# Patient Record
Sex: MALE | Race: OTHER | Employment: UNEMPLOYED | URBAN - METROPOLITAN AREA
[De-identification: names, ages, dates, MRNs, and addresses within clinical notes are randomized per-mention and may not be internally consistent; named-entity substitution may affect disease eponyms.]

---

## 2019-01-06 ENCOUNTER — HOSPITAL ENCOUNTER (EMERGENCY)
Age: 49
Discharge: HOME | End: 2019-01-06
Payer: COMMERCIAL

## 2019-01-06 VITALS
RESPIRATION RATE: 18 BRPM | TEMPERATURE: 98.2 F | DIASTOLIC BLOOD PRESSURE: 98 MMHG | SYSTOLIC BLOOD PRESSURE: 158 MMHG | HEART RATE: 67 BPM | OXYGEN SATURATION: 97 %

## 2019-01-06 DIAGNOSIS — I10 ASYMPTOMATIC HYPERTENSION: Primary | ICD-10-CM

## 2019-01-06 PROCEDURE — 99282 EMERGENCY DEPT VISIT SF MDM: CPT

## 2019-01-06 PROCEDURE — 6370000000 HC RX 637 (ALT 250 FOR IP): Performed by: NURSE PRACTITIONER

## 2019-01-06 RX ORDER — ATORVASTATIN CALCIUM 40 MG/1
40 TABLET, FILM COATED ORAL DAILY
Status: ON HOLD | COMMUNITY
End: 2022-05-23 | Stop reason: HOSPADM

## 2019-01-06 RX ORDER — LEVETIRACETAM 500 MG/1
500 TABLET ORAL 2 TIMES DAILY
Status: ON HOLD | COMMUNITY
End: 2022-05-23 | Stop reason: HOSPADM

## 2019-01-06 RX ORDER — AMLODIPINE BESYLATE 10 MG/1
10 TABLET ORAL DAILY
Qty: 30 TABLET | Refills: 0 | Status: ON HOLD | OUTPATIENT
Start: 2019-01-06 | End: 2022-05-23 | Stop reason: HOSPADM

## 2019-01-06 RX ORDER — AMLODIPINE BESYLATE 10 MG/1
10 TABLET ORAL DAILY
COMMUNITY
End: 2019-01-06

## 2019-01-06 RX ORDER — CARVEDILOL 25 MG/1
25 TABLET ORAL 2 TIMES DAILY
Status: ON HOLD | COMMUNITY
End: 2022-05-23 | Stop reason: HOSPADM

## 2019-01-06 RX ORDER — LOSARTAN POTASSIUM AND HYDROCHLOROTHIAZIDE 12.5; 1 MG/1; MG/1
1 TABLET ORAL DAILY
Status: ON HOLD | COMMUNITY
End: 2022-05-23 | Stop reason: HOSPADM

## 2019-01-06 RX ORDER — AMLODIPINE BESYLATE 5 MG/1
10 TABLET ORAL ONCE
Status: COMPLETED | OUTPATIENT
Start: 2019-01-06 | End: 2019-01-06

## 2019-01-06 RX ADMIN — AMLODIPINE BESYLATE 10 MG: 5 TABLET ORAL at 12:49

## 2021-05-24 ENCOUNTER — APPOINTMENT (OUTPATIENT)
Dept: GENERAL RADIOLOGY | Age: 51
DRG: 421 | End: 2021-05-24
Payer: COMMERCIAL

## 2021-05-24 ENCOUNTER — HOSPITAL ENCOUNTER (INPATIENT)
Age: 51
LOS: 2 days | Discharge: NURSING, REHAB OR LONG-TERM CARE | DRG: 421 | End: 2021-05-26
Attending: EMERGENCY MEDICINE | Admitting: INTERNAL MEDICINE
Payer: COMMERCIAL

## 2021-05-24 ENCOUNTER — APPOINTMENT (OUTPATIENT)
Dept: CT IMAGING | Age: 51
DRG: 421 | End: 2021-05-24
Payer: COMMERCIAL

## 2021-05-24 DIAGNOSIS — S62.667A CLOSED NONDISPLACED FRACTURE OF DISTAL PHALANX OF LEFT LITTLE FINGER, INITIAL ENCOUNTER: ICD-10-CM

## 2021-05-24 DIAGNOSIS — E04.1 THYROID NODULE: ICD-10-CM
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
## 2021-05-24 DIAGNOSIS — R62.7 FAILURE TO THRIVE IN ADULT: ICD-10-CM

## 2021-05-24 DIAGNOSIS — S62.657A CLOSED NONDISPLACED FRACTURE OF MIDDLE PHALANX OF LEFT LITTLE FINGER, INITIAL ENCOUNTER: ICD-10-CM

## 2021-05-24 DIAGNOSIS — N28.1 RENAL CYST: ICD-10-CM

## 2021-05-24 PROBLEM — M25.552 LEFT HIP PAIN: Status: ACTIVE | Noted: 2021-05-24

## 2021-05-24 LAB
ANION GAP SERPL CALCULATED.3IONS-SCNC: 11 MMOL/L (ref 7–16)
ANION GAP SERPL CALCULATED.3IONS-SCNC: 12 MMOL/L (ref 7–16)
APTT: 21.6 SEC (ref 24.5–35.1)
BASOPHILS ABSOLUTE: 0.03 E9/L (ref 0–0.2)
BASOPHILS RELATIVE PERCENT: 0.4 % (ref 0–2)
BUN BLDV-MCNC: 13 MG/DL (ref 6–20)
BUN BLDV-MCNC: 14 MG/DL (ref 6–20)
CALCIUM SERPL-MCNC: 8.8 MG/DL (ref 8.6–10.2)
CALCIUM SERPL-MCNC: 9.2 MG/DL (ref 8.6–10.2)
CHLORIDE BLD-SCNC: 103 MMOL/L (ref 98–107)
CHLORIDE BLD-SCNC: 97 MMOL/L (ref 98–107)
CO2: 21 MMOL/L (ref 22–29)
CO2: 24 MMOL/L (ref 22–29)
CREAT SERPL-MCNC: 0.9 MG/DL (ref 0.7–1.2)
CREAT SERPL-MCNC: 0.9 MG/DL (ref 0.7–1.2)
EOSINOPHILS ABSOLUTE: 0.26 E9/L (ref 0.05–0.5)
EOSINOPHILS RELATIVE PERCENT: 3.6 % (ref 0–6)
GFR AFRICAN AMERICAN: >60
GFR AFRICAN AMERICAN: >60
GFR NON-AFRICAN AMERICAN: >60 ML/MIN/1.73
GFR NON-AFRICAN AMERICAN: >60 ML/MIN/1.73
GLUCOSE BLD-MCNC: 274 MG/DL (ref 74–99)
GLUCOSE BLD-MCNC: 544 MG/DL (ref 74–99)
HCT VFR BLD CALC: 45.2 % (ref 37–54)
HEMOGLOBIN: 15.5 G/DL (ref 12.5–16.5)
IMMATURE GRANULOCYTES #: 0.02 E9/L
IMMATURE GRANULOCYTES %: 0.3 % (ref 0–5)
INR BLD: 1.1
LYMPHOCYTES ABSOLUTE: 1.57 E9/L (ref 1.5–4)
LYMPHOCYTES RELATIVE PERCENT: 21.8 % (ref 20–42)
MCH RBC QN AUTO: 27.4 PG (ref 26–35)
MCHC RBC AUTO-ENTMCNC: 34.3 % (ref 32–34.5)
MCV RBC AUTO: 80 FL (ref 80–99.9)
MONOCYTES ABSOLUTE: 0.58 E9/L (ref 0.1–0.95)
MONOCYTES RELATIVE PERCENT: 8.1 % (ref 2–12)
NEUTROPHILS ABSOLUTE: 4.74 E9/L (ref 1.8–7.3)
NEUTROPHILS RELATIVE PERCENT: 65.8 % (ref 43–80)
PDW BLD-RTO: 12.6 FL (ref 11.5–15)
PLATELET # BLD: 251 E9/L (ref 130–450)
PMV BLD AUTO: 10.4 FL (ref 7–12)
POTASSIUM REFLEX MAGNESIUM: 4.1 MMOL/L (ref 3.5–5)
POTASSIUM REFLEX MAGNESIUM: 4.8 MMOL/L (ref 3.5–5)
PROTHROMBIN TIME: 12 SEC (ref 9.3–12.4)
RBC # BLD: 5.65 E12/L (ref 3.8–5.8)
SODIUM BLD-SCNC: 133 MMOL/L (ref 132–146)
SODIUM BLD-SCNC: 135 MMOL/L (ref 132–146)
TROPONIN: <0.01 NG/ML (ref 0–0.03)
WBC # BLD: 7.2 E9/L (ref 4.5–11.5)

## 2021-05-24 PROCEDURE — 6360000002 HC RX W HCPCS: Performed by: STUDENT IN AN ORGANIZED HEALTH CARE EDUCATION/TRAINING PROGRAM

## 2021-05-24 PROCEDURE — G0378 HOSPITAL OBSERVATION PER HR: HCPCS

## 2021-05-24 PROCEDURE — 73521 X-RAY EXAM HIPS BI 2 VIEWS: CPT

## 2021-05-24 PROCEDURE — 6370000000 HC RX 637 (ALT 250 FOR IP): Performed by: INTERNAL MEDICINE

## 2021-05-24 PROCEDURE — 84484 ASSAY OF TROPONIN QUANT: CPT

## 2021-05-24 PROCEDURE — 73090 X-RAY EXAM OF FOREARM: CPT

## 2021-05-24 PROCEDURE — 80048 BASIC METABOLIC PNL TOTAL CA: CPT

## 2021-05-24 PROCEDURE — 73620 X-RAY EXAM OF FOOT: CPT

## 2021-05-24 PROCEDURE — 72128 CT CHEST SPINE W/O DYE: CPT

## 2021-05-24 PROCEDURE — 85730 THROMBOPLASTIN TIME PARTIAL: CPT

## 2021-05-24 PROCEDURE — 73120 X-RAY EXAM OF HAND: CPT

## 2021-05-24 PROCEDURE — 70450 CT HEAD/BRAIN W/O DYE: CPT

## 2021-05-24 PROCEDURE — 73130 X-RAY EXAM OF HAND: CPT

## 2021-05-24 PROCEDURE — 2580000003 HC RX 258: Performed by: RADIOLOGY

## 2021-05-24 PROCEDURE — 90715 TDAP VACCINE 7 YRS/> IM: CPT | Performed by: STUDENT IN AN ORGANIZED HEALTH CARE EDUCATION/TRAINING PROGRAM

## 2021-05-24 PROCEDURE — 96372 THER/PROPH/DIAG INJ SC/IM: CPT

## 2021-05-24 PROCEDURE — 99283 EMERGENCY DEPT VISIT LOW MDM: CPT

## 2021-05-24 PROCEDURE — 6370000000 HC RX 637 (ALT 250 FOR IP): Performed by: STUDENT IN AN ORGANIZED HEALTH CARE EDUCATION/TRAINING PROGRAM

## 2021-05-24 PROCEDURE — 6360000004 HC RX CONTRAST MEDICATION: Performed by: RADIOLOGY

## 2021-05-24 PROCEDURE — 6360000002 HC RX W HCPCS: Performed by: INTERNAL MEDICINE

## 2021-05-24 PROCEDURE — 71275 CT ANGIOGRAPHY CHEST: CPT

## 2021-05-24 PROCEDURE — 93005 ELECTROCARDIOGRAM TRACING: CPT | Performed by: STUDENT IN AN ORGANIZED HEALTH CARE EDUCATION/TRAINING PROGRAM

## 2021-05-24 PROCEDURE — 73600 X-RAY EXAM OF ANKLE: CPT

## 2021-05-24 PROCEDURE — 85025 COMPLETE CBC W/AUTO DIFF WBC: CPT

## 2021-05-24 PROCEDURE — 72125 CT NECK SPINE W/O DYE: CPT

## 2021-05-24 PROCEDURE — 97165 OT EVAL LOW COMPLEX 30 MIN: CPT

## 2021-05-24 PROCEDURE — 90471 IMMUNIZATION ADMIN: CPT | Performed by: STUDENT IN AN ORGANIZED HEALTH CARE EDUCATION/TRAINING PROGRAM

## 2021-05-24 PROCEDURE — 72131 CT LUMBAR SPINE W/O DYE: CPT

## 2021-05-24 PROCEDURE — 2580000003 HC RX 258: Performed by: INTERNAL MEDICINE

## 2021-05-24 PROCEDURE — 71045 X-RAY EXAM CHEST 1 VIEW: CPT

## 2021-05-24 PROCEDURE — 2580000003 HC RX 258: Performed by: STUDENT IN AN ORGANIZED HEALTH CARE EDUCATION/TRAINING PROGRAM

## 2021-05-24 PROCEDURE — 97161 PT EVAL LOW COMPLEX 20 MIN: CPT | Performed by: PHYSICAL THERAPIST

## 2021-05-24 PROCEDURE — 74177 CT ABD & PELVIS W/CONTRAST: CPT

## 2021-05-24 PROCEDURE — 1200000000 HC SEMI PRIVATE

## 2021-05-24 PROCEDURE — 85610 PROTHROMBIN TIME: CPT

## 2021-05-24 RX ORDER — ASPIRIN 81 MG/1
81 TABLET, COATED ORAL DAILY
Status: ON HOLD | COMMUNITY
Start: 2021-05-08 | End: 2022-05-20 | Stop reason: HOSPADM

## 2021-05-24 RX ORDER — ESCITALOPRAM OXALATE 20 MG/1
20 TABLET ORAL DAILY
COMMUNITY
Start: 2021-04-29

## 2021-05-24 RX ORDER — ACETAMINOPHEN 650 MG/1
650 SUPPOSITORY RECTAL EVERY 6 HOURS PRN
Status: DISCONTINUED | OUTPATIENT
Start: 2021-05-24 | End: 2021-05-26 | Stop reason: HOSPADM

## 2021-05-24 RX ORDER — AMLODIPINE BESYLATE 10 MG/1
10 TABLET ORAL DAILY
COMMUNITY
Start: 2021-05-14 | End: 2021-06-11

## 2021-05-24 RX ORDER — SODIUM CHLORIDE 0.9 % (FLUSH) 0.9 %
5-40 SYRINGE (ML) INJECTION EVERY 12 HOURS SCHEDULED
Status: DISCONTINUED | OUTPATIENT
Start: 2021-05-24 | End: 2021-05-26 | Stop reason: HOSPADM

## 2021-05-24 RX ORDER — 0.9 % SODIUM CHLORIDE 0.9 %
1000 INTRAVENOUS SOLUTION INTRAVENOUS ONCE
Status: COMPLETED | OUTPATIENT
Start: 2021-05-24 | End: 2021-05-24

## 2021-05-24 RX ORDER — SODIUM CHLORIDE 0.9 % (FLUSH) 0.9 %
10 SYRINGE (ML) INJECTION PRN
Status: DISCONTINUED | OUTPATIENT
Start: 2021-05-24 | End: 2021-05-26 | Stop reason: HOSPADM

## 2021-05-24 RX ORDER — SODIUM CHLORIDE 9 MG/ML
25 INJECTION, SOLUTION INTRAVENOUS PRN
Status: DISCONTINUED | OUTPATIENT
Start: 2021-05-24 | End: 2021-05-26 | Stop reason: HOSPADM

## 2021-05-24 RX ORDER — PROMETHAZINE HYDROCHLORIDE 25 MG/1
12.5 TABLET ORAL EVERY 6 HOURS PRN
Status: DISCONTINUED | OUTPATIENT
Start: 2021-05-24 | End: 2021-05-26 | Stop reason: HOSPADM

## 2021-05-24 RX ORDER — ACETAMINOPHEN 325 MG/1
650 TABLET ORAL EVERY 6 HOURS PRN
Status: DISCONTINUED | OUTPATIENT
Start: 2021-05-24 | End: 2021-05-26 | Stop reason: HOSPADM

## 2021-05-24 RX ORDER — LEVETIRACETAM 500 MG/1
500 TABLET ORAL 2 TIMES DAILY
COMMUNITY
Start: 2021-03-01 | End: 2021-06-11

## 2021-05-24 RX ORDER — SODIUM CHLORIDE 0.9 % (FLUSH) 0.9 %
10 SYRINGE (ML) INJECTION ONCE
Status: COMPLETED | OUTPATIENT
Start: 2021-05-24 | End: 2021-05-24

## 2021-05-24 RX ORDER — POLYETHYLENE GLYCOL 3350 17 G/17G
17 POWDER, FOR SOLUTION ORAL DAILY PRN
Status: DISCONTINUED | OUTPATIENT
Start: 2021-05-24 | End: 2021-05-26 | Stop reason: HOSPADM

## 2021-05-24 RX ORDER — INSULIN DEGLUDEC INJECTION 100 U/ML
25 INJECTION, SOLUTION SUBCUTANEOUS DAILY
Status: ON HOLD | COMMUNITY
Start: 2021-05-21 | End: 2022-05-23 | Stop reason: HOSPADM

## 2021-05-24 RX ORDER — LIDOCAINE HYDROCHLORIDE 10 MG/ML
10 INJECTION, SOLUTION EPIDURAL; INFILTRATION; INTRACAUDAL; PERINEURAL SEE ADMIN INSTRUCTIONS
Status: DISCONTINUED | OUTPATIENT
Start: 2021-05-24 | End: 2021-05-26 | Stop reason: HOSPADM

## 2021-05-24 RX ORDER — HYDROCODONE BITARTRATE AND ACETAMINOPHEN 5; 325 MG/1; MG/1
1 TABLET ORAL EVERY 6 HOURS PRN
Status: DISCONTINUED | OUTPATIENT
Start: 2021-05-24 | End: 2021-05-26 | Stop reason: HOSPADM

## 2021-05-24 RX ORDER — SODIUM CHLORIDE 9 MG/ML
INJECTION, SOLUTION INTRAVENOUS CONTINUOUS
Status: ACTIVE | OUTPATIENT
Start: 2021-05-24 | End: 2021-05-25

## 2021-05-24 RX ORDER — ONDANSETRON 2 MG/ML
4 INJECTION INTRAMUSCULAR; INTRAVENOUS EVERY 6 HOURS PRN
Status: DISCONTINUED | OUTPATIENT
Start: 2021-05-24 | End: 2021-05-26 | Stop reason: HOSPADM

## 2021-05-24 RX ORDER — CARVEDILOL 25 MG/1
25 TABLET ORAL 2 TIMES DAILY WITH MEALS
COMMUNITY
Start: 2021-03-24 | End: 2021-06-11 | Stop reason: CLARIF

## 2021-05-24 RX ADMIN — IOPAMIDOL 90 ML: 755 INJECTION, SOLUTION INTRAVENOUS at 11:36

## 2021-05-24 RX ADMIN — SODIUM CHLORIDE 1000 ML: 9 INJECTION, SOLUTION INTRAVENOUS at 12:10

## 2021-05-24 RX ADMIN — TETANUS TOXOID, REDUCED DIPHTHERIA TOXOID AND ACELLULAR PERTUSSIS VACCINE, ADSORBED 0.5 ML: 5; 2.5; 8; 8; 2.5 SUSPENSION INTRAMUSCULAR at 13:46

## 2021-05-24 RX ADMIN — HYDROCODONE BITARTRATE AND ACETAMINOPHEN 1 TABLET: 5; 325 TABLET ORAL at 21:24

## 2021-05-24 RX ADMIN — INSULIN LISPRO 10 UNITS: 100 INJECTION, SOLUTION INTRAVENOUS; SUBCUTANEOUS at 12:10

## 2021-05-24 RX ADMIN — ENOXAPARIN SODIUM 40 MG: 40 INJECTION SUBCUTANEOUS at 23:58

## 2021-05-24 RX ADMIN — SODIUM CHLORIDE: 9 INJECTION, SOLUTION INTRAVENOUS at 23:54

## 2021-05-24 RX ADMIN — Medication 10 ML: at 11:36

## 2021-05-24 RX ADMIN — Medication 10 ML: at 23:54

## 2021-05-24 SDOH — HEALTH STABILITY: MENTAL HEALTH: HOW OFTEN DO YOU HAVE A DRINK CONTAINING ALCOHOL?: NEVER

## 2021-05-24 ASSESSMENT — PAIN DESCRIPTION - LOCATION: LOCATION: FINGER (COMMENT WHICH ONE);ARM

## 2021-05-24 ASSESSMENT — PAIN SCALES - GENERAL
PAINLEVEL_OUTOF10: 5
PAINLEVEL_OUTOF10: 4

## 2021-05-24 ASSESSMENT — PAIN SCALES - WONG BAKER: WONGBAKER_NUMERICALRESPONSE: 8

## 2021-05-24 NOTE — ED PROVIDER NOTES
ATTENDING PROVIDER ATTESTATION:     Wong Hernandez presented to the emergency department for evaluation of Fall (pt is mute at baseline, fell last friday, abrasion above right eye. arm and leg pain. hx of seizures)   and was initially evaluated by the Medical Resident. See Original ED Note for H&P and ED course above. I have reviewed and discussed the case, including pertinent history (medical, surgical, family and social) and exam findings with the Medical Resident assigned to Wong Hernandez. I have personally performed and/or participated in the history, exam, medical decision making, and procedures and agree with all pertinent clinical information and any additional changes or corrections are noted below in my assessment and plan. I have discussed this patient in detail with the resident, and provided the instruction and education,       I have reviewed my findings and recommendations with the assigned Medical Resident, Wong Hernandez and members of family present at the time of disposition. I have performed a history and physical examination of this patient and directly supervised the resident caring for this patient      History of Present Illness:    Presents to the ED for fall, beginning a few days ago. The complaint has been constant, moderate in severity, and worsened by nothing. Patient had a prior stroke is nonverbal, has chronic weakness to the right arm and right leg. Here with his sister who said he fell Friday. She reports multiple abrasions on his arms and legs as well as above his right eye. She says she is having trouble caring for him at home. Again he is nonverbal not able to provide any history. He is not in any acute distress on arrival.    ENCOUNTER LIMITATION:    Please note that the HPI, ROS, Past History, and Physical Examination are limited due to this patients aphasia from prior stroke.         Review of Systems:   A complete review of systems was unable to be performed secondary to the limitations noted above         tems reviewed and are negative.    --------------------------------------------- PAST HISTORY ---------------------------------------------  Past Medical History:  has a past medical history of Seizures ([facility] [address] Stroke  Past Surgical History: No known history  Social History:  reports that he has never smoked. He has never used smokeless tobacco. He reports previous drug use. He reports that he does not drink alcohol. Family History: family history is not on file. Unless otherwise noted, family history is non contributory    The patients home medications have been reviewed. Allergies: Patient has no known allergies. Physical Exam:  Constitutional/General: Alert and awake, nonverbal  Head: Normocephalic and abrasion along the right eyelid  Eyes: PERRL, EOMI, sclera non icteric  ENT: Oropharynx clear, handling secretions  Neck: Supple, full ROM, no stridor, no meningeal signs  Respiratory: Lungs clear to auscultation bilaterally, no wheezes, rales, or rhonchi. Not in respiratory distress  Cardiovascular:  Regular rate. Regular rhythm. 2+ distal pulses. Equal extremity pulses. GI:  Abdomen Soft, Non tender, Non distended. No rebound, guarding, or rigidity. No pulsatile masses. Musculoskeletal: Chronic right upper and lower extremity weakness. Deformity to the left fifth finger (chronic per the sister). warm and well perfused, Palpable peripheral pulses  Integument: skin warm and dry. No rashes. Multiple abrasions along the hands and feet. Neurologic: Nonverbal (baseline), chronic right upper and lower extremity weakness. No new deficits per the sister        I directly supervised any procedures performed by the resident and was present for the procedure including all critical portions of the procedure      The cardiac monitor revealed sinus rhythm with a heart rate in the 60 s as interpreted by me.  The cardiac monitor was ordered secondary to the patient's fall and to monitor the patient for dysrhythmia. CPT [id number]      I, Dr. Ru Ruvalcaba, am the primary provider of record    My Medical Decision Making:         Fall, finger fracture, the family is also unable to care for him at home, questionable greater trochanteric hip fracture as well. Ortho consulted, sister request admission and placement to a nursing facility. He was unable to be placed directly from the ED secondary to his medical insurance. 1. Renal cyst    2. Thyroid nodule    3. Fall, initial encounter    4. Failure to thrive in adult    5.  Closed nondisplaced fracture of distal phalanx of left little finger, initial encounter           Sal Barthel, MD  05/24/21 6523

## 2021-05-24 NOTE — ED PROVIDER NOTES
Patient is a 58-year-old male presenting to emergency department after a fall. He is nonambulatory at baseline, apparently fell on Friday, he sustained abrasions above his right eye, bilateral hands, and foot. History is limited secondary to patient being nonverbal.  Currently he has been wincing with pain when his hands of been touched, onset was since Friday, uncertain severity, worsens palpation, nothing makes it better, sudden onset. Unknown if he lost consciousness, he does have history of diabetes, his sister denies him being on any blood thinners. Review of Systems   Unable to perform ROS: Patient nonverbal   Skin: Positive for wound (Bruising). Physical Exam  Vitals and nursing note reviewed. Constitutional:       Appearance: He is well-developed. HENT:      Head: Normocephalic. Comments: Abrasions on the face     Right Ear: External ear normal.      Left Ear: External ear normal.      Nose: Nose normal.   Eyes:      Extraocular Movements: Extraocular movements intact. Conjunctiva/sclera: Conjunctivae normal.      Pupils: Pupils are equal, round, and reactive to light. Comments: Tracks with eyes   Cardiovascular:      Rate and Rhythm: Normal rate and regular rhythm. Heart sounds: Normal heart sounds. No murmur heard. Pulmonary:      Effort: Pulmonary effort is normal. No respiratory distress. Breath sounds: Normal breath sounds. No wheezing or rales. Abdominal:      General: Bowel sounds are normal.      Palpations: Abdomen is soft. Tenderness: There is no abdominal tenderness. There is no guarding or rebound. Musculoskeletal:         General: Swelling (Left ring finger has ecchymosis, is tender to touch at the DIP) and tenderness (Several abrasions, one on the right foot, on the hands bilaterally, as well as the face) present. Cervical back: Normal range of motion and neck supple. Skin:     General: Skin is warm and dry. -------------------------------------------------    LABS:  Results for orders placed or performed during the hospital encounter of 05/24/21   CBC Auto Differential   Result Value Ref Range    WBC 7.2 4.5 - 11.5 E9/L    RBC 5.65 3.80 - 5.80 E12/L    Hemoglobin 15.5 12.5 - 16.5 g/dL    Hematocrit 45.2 37.0 - 54.0 %    MCV 80.0 80.0 - 99.9 fL    MCH 27.4 26.0 - 35.0 pg    MCHC 34.3 32.0 - 34.5 %    RDW 12.6 11.5 - 15.0 fL    Platelets 012 585 - 908 E9/L    MPV 10.4 7.0 - 12.0 fL    Neutrophils % 65.8 43.0 - 80.0 %    Immature Granulocytes % 0.3 0.0 - 5.0 %    Lymphocytes % 21.8 20.0 - 42.0 %    Monocytes % 8.1 2.0 - 12.0 %    Eosinophils % 3.6 0.0 - 6.0 %    Basophils % 0.4 0.0 - 2.0 %    Neutrophils Absolute 4.74 1.80 - 7.30 E9/L    Immature Granulocytes # 0.02 E9/L    Lymphocytes Absolute 1.57 1.50 - 4.00 E9/L    Monocytes Absolute 0.58 0.10 - 0.95 E9/L    Eosinophils Absolute 0.26 0.05 - 0.50 E9/L    Basophils Absolute 0.03 0.00 - 0.20 T3/S   Basic Metabolic Panel w/ Reflex to MG   Result Value Ref Range    Sodium 133 132 - 146 mmol/L    Potassium reflex Magnesium 4.8 3.5 - 5.0 mmol/L    Chloride 97 (L) 98 - 107 mmol/L    CO2 24 22 - 29 mmol/L    Anion Gap 12 7 - 16 mmol/L    Glucose 544 (HH) 74 - 99 mg/dL    BUN 14 6 - 20 mg/dL    CREATININE 0.9 0.7 - 1.2 mg/dL    GFR Non-African American >60 >=60 mL/min/1.73    GFR African American >60     Calcium 9.2 8.6 - 10.2 mg/dL   Troponin   Result Value Ref Range    Troponin <0.01 0.00 - 0.03 ng/mL   Protime-INR   Result Value Ref Range    Protime 12.0 9.3 - 12.4 sec    INR 1.1    APTT   Result Value Ref Range    aPTT 21.6 (L) 24.5 - 35.1 sec   Basic Metabolic Panel w/ Reflex to MG   Result Value Ref Range    Sodium 135 132 - 146 mmol/L    Potassium reflex Magnesium 4.1 3.5 - 5.0 mmol/L    Chloride 103 98 - 107 mmol/L    CO2 21 (L) 22 - 29 mmol/L    Anion Gap 11 7 - 16 mmol/L    Glucose 274 (H) 74 - 99 mg/dL    BUN 13 6 - 20 mg/dL    CREATININE 0.9 0.7 - 1.2 mg/dL    GFR Non-African American >60 >=60 mL/min/1.73    GFR African American >60     Calcium 8.8 8.6 - 10.2 mg/dL   EKG 12 Lead   Result Value Ref Range    Ventricular Rate 69 BPM    Atrial Rate 69 BPM    P-R Interval 172 ms    QRS Duration 78 ms    Q-T Interval 430 ms    QTc Calculation (Bazett) 460 ms    P Axis 57 degrees    R Axis -41 degrees    T Axis 12 degrees       RADIOLOGY:  XR HAND LEFT (2 VIEWS)   Final Result   Persistent boutonniere deformity of the left 5th finger and with acute,   nondisplaced dorsal plate avulsion fracture of the distal phalanx of this   same digit. CT Head WO Contrast   Final Result   1. There is no acute intracranial abnormality. Specifically, there is no   intracranial hemorrhage. 2. Atrophy and periventricular leukomalacia,   3. 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 4. Old infarct within the left cerebellum. CT Cervical Spine WO Contrast   Final Result   1. No acute osseous abnormality is identified. 2. An incidentally noted thyroid nodule measures up to 1.8 cm. Please see   the recommendation section below. 3. Nonspecific lucencies in the cervical spine are indeterminate without   aggressive imaging features. Please see the recommendation section below. RECOMMENDATIONS:   1. 1.8 cm incidental thyroid nodule. Recommend thyroid US. Reference: J Am   Bianca Radiol. 2015 Feb;12(2): 143-50   2. Nonspecific osseous lucencies are without aggressive imaging features. If   there are risk factors for osseous metastatic disease in this individual, MRI   cervical spine with and without contrast may be considered for   characterization. CT ABDOMEN PELVIS W IV CONTRAST Additional Contrast? None   Final Result   No traumatic or other acute abnormality is seen. CTA CHEST W CONTRAST   Final Result   1. There is no evidence of a pulmonary embolus   2. There are no findings of atypical or COVID pneumonia   3.  Minimal biapical pleuroparenchymal scarring 4. Minimal atelectasis within the lingula. CT THORACIC SPINE WO CONTRAST   Final Result   No acute osseous abnormality is identified. CT LUMBAR SPINE WO CONTRAST   Final Result   1. Chronic, multilevel, minimal to mild, asymmetric vertebral body   compression deformities are most notable right laterally at L4, followed by   adjacent levels, further contributing to mild levo-lordosis of the visualized   spine, with a Howell angle of approximately 9 degrees. 2. Multilevel degenerative disease, overall most notable at L5/S1, where   there is at least mild central spinal canal stenosis and moderate/severe   bilateral neural foraminal narrowing, slightly worse on the right. 3.  Moderate inferoposterior paraspinal muscular atrophy, lessening   superiorly. 4.  Incidental findings, as described. .      RECOMMENDATIONS:   1. If unexplained symptoms persist, consider noncontrast MRI of the lumbar   spine for further evaluation. .         XR CHEST PORTABLE   Final Result   1. Cardiomegaly. There is no evidence of failure or pneumonia. XR HAND RIGHT (MIN 3 VIEWS)   Final Result   1. Questionable nondisplaced subtle fracture through the base of the 1st   metacarpal only seen on the lateral view. A vague cortical step-off is   noted. Please obtain additional views of the right thumb. XR RADIUS ULNA LEFT (2 VIEWS)   Final Result   1. There is no acute fracture dislocation of the left forearm   2. Old ununited fracture of the ulnar styloid process         XR RADIUS ULNA RIGHT (2 VIEWS)   Final Result   1. There is no acute fracture of the right radius or ulna   2. Degenerative changes of the radiocarpal joint and intercarpal joints. XR HIP BILATERAL W AP PELVIS (2 VIEWS)   Final Result   1. Questionable subtle fracture within the left greater trochanter. Further   evaluation with CT of the left hip is recommended   2.  There is no appreciable fracture of the right hip   3. Advanced degenerative changes of the right left hips. XR FOOT RIGHT (2 VIEWS)   Final Result   1. There is no acute fracture of the right foot   2. Degenerative joint disease         XR HAND LEFT (MIN 3 VIEWS)   Final Result   1. Ulnar styloid fracture is favored to be nonacute. The bones otherwise   appear intact. 2. Boutonniere deformity of the 5th digit may be seen with rheumatoid   arthritis or tendinopathy/rupture of the central slip of the extensor   digitorum. 3. Arthritic changes are compatible with osteoarthritis. XR ANKLE RIGHT (2 VIEWS)   Final Result   1. There is no acute fracture or dislocation of the right ankle.                   ------------------------- NURSING NOTES AND VITALS REVIEWED ---------------------------  Date / Time Roomed:  5/24/2021  9:23 AM  ED Bed Assignment:  08/08    The nursing notes within the ED encounter and vital signs as below have been reviewed. Patient Vitals for the past 24 hrs:   BP Temp Pulse Resp SpO2 Weight   05/24/21 1426 130/80 -- 72 18 97 % --   05/24/21 0920 132/83 98.2 °F (36.8 °C) 76 18 98 % 180 lb (81.6 kg)   05/24/21 0913 -- 97.7 °F (36.5 °C) 67 -- 97 % --       Oxygen Saturation Interpretation: Normal    ------------------------------------------ PROGRESS NOTES ------------------------------------------    Counseling:  I have spoken with the sister and discussed todays results, in addition to providing specific details for the plan of care and counseling regarding the diagnosis and prognosis. Their questions are answered at this time and they are agreeable with the plan of admission.    --------------------------------- ADDITIONAL PROVIDER NOTES ---------------------------------  Consultations:  Time: 2248. Spoke with Dr. Marissa Clemente. Discussed case. They will admit the patient.   This patient's ED course included: a personal history and physicial examination, re-evaluation prior to disposition, multiple bedside re-evaluations, IV medications, cardiac monitoring and continuous pulse oximetry    This patient has remained hemodynamically stable during their ED course. Diagnosis:  1. Renal cyst    2. Thyroid nodule    3. Fall, initial encounter    4. Closed nondisplaced fracture of distal phalanx of right little finger, initial encounter    5. Failure to thrive in adult        Disposition:  Patient's disposition: Admit to med/surg floor  Patient's condition is stable.            Thee Aldrich MD  Resident  05/24/21 2074

## 2021-05-24 NOTE — PROGRESS NOTES
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 stairs to enter and ? rail. Bed is on first floor and bath is on first floor. Pt uses w/c for primary mobility, completing pivot transfers with assist and use of L side. Pt requires assist from his sister for mobility and ADLs. OBJECTIVE:   Initial Evaluation  Date: 21 Treatment Short Term/ Long Term   Goals   AM-PAC 6 Clicks      Was pt agreeable to Eval/treatment? yes     Does pt have pain? No signs of pain during session     Bed Mobility  Rolling: NT  Supine to sit: Max Ax2  Sit to supine: Max Ax2  Scooting: Max A to EOB  Rolling: Yari  Supine to sit: Yari  Sit to supine: Yari  Scooting: Yari to EOB   Transfers Sit to stand: Max A x2  Stand to sit: Max Ax2  Stand pivot: NT  Sit to stand: Min A  Stand to sit: Min A  Stand pivot: Min A with AAD   Ambulation      W/C mobility  NT    NT  NA, pt non-ambulatory at baseline  >50 feet with manual w/c at Supervision   Stair negotiation: ascended and descended  NT  NA   ROM BUE:  Limited due to R UE contractures, L UE WNL  BLE:  WNL     Strength BUE:  Defer to OT  R LE:  0/5  L LE: moderate functional weakness, unable to follow cues for MMT     Balance Sitting EOB:  Mod A  Static Standing: Max A x2  Sitting EOB:  Supervison  Dynamic Standing:  Min A with AAD     Pt is A & O x ? Unable to answer as pt is nonverbal at baseline.   Sensation:  Pt denies numbness and tingling to extremities  Edema:  unremarkable    Patient education  Pt educated on role of mobility, safety with transfers    Patient response to education:   Pt verbalized understanding Pt demonstrated skill Pt requires further education in this area   Pt non-verbal, sister states understanding With assistance yes     ASSESSMENT:    Conditions Requiring Skilled Therapeutic Intervention:    [x]Decreased strength     [x]Decreased ROM  [x]Decreased functional mobility  [x]Decreased balance   [x]Decreased endurance   [x]Decreased posture  []Decreased sensation  []Decreased coordination   []Decreased vision  [x]Decreased safety awareness   []Increased pain       Comments:  Pt resting semi-supine upon arrival, sister at bedside providing PLOF and home set up. Pt noted to have R UE contractures from previous CVA, no active movement of R LE. Pt demonstrates inconsistent ability to follow single step cues with verbal/visual/or bilingual instructions (sister translating as she declined  service). Pt maintains posterior listing with rotation of trunk towards R side with all sitting. He completed single sit<>stand with 2x assist blocking R LE and manual assist from therapists using gait belt for safety. Pt unsafe to attempt pivot transfer at this time due to pt difficulty maintaining NWB of R UE frequently reaching for support and decreased ability to follow cues. Pt returned to semi-supine upon completion of session with all needs in reach and sister at bedside. He will benefit from continued skilled PT services to improve independence with bed mobility, transfers, and balance. Treatment:  Patient practiced and was instructed in the following treatment:     Bed mobility: cues for engagement of B LEs and trunk, manual assist to complete supine<>sit as noted above  BlogGlue training: cues for maintaining NWB of R hand, manual assist for blocking of B feet and R LE at knee for extension, manual assist for lift/lower    Pt's/ family goals   1.  To improve safety and decrease falls with mobility    Prognosis is good for reaching above PT goals. Patient and or family understand(s) diagnosis, prognosis, and plan of care. yes    PHYSICAL THERAPY PLAN OF CARE:    PT POC is established based on physician order and patient diagnosis     Referring provider/PT Order:    05/24/21 1515  PT eval and treat Start: 05/24/21 1515, End: 05/24/21 1515, ONE TIME, Standing Count: 1 Occurrences, R      Bari Willis MD     Diagnosis:  Failure to thrive in adult [R62.7]  Specific instructions for next treatment:  Progress mobility as tolerated for transfers    Current Treatment Recommendations:     [x] Strengthening to improve independence with functional mobility   [] ROM to improve independence with functional mobility   [x] Balance Training to improve static/dynamic balance and to reduce fall risk  [x] Endurance Training to improve activity tolerance during functional mobility   [x] Transfer Training to improve safety and independence with all functional transfers   [] Gait Training to improve gait mechanics, endurance and asses need for appropriate assistive device  [] Stair Training in preparation for safe discharge home and/or into the community   [x] Positioning to prevent skin breakdown and contractures  [x] Safety and Education Training   [x] Patient/Caregiver Education   [] HEP  [] Other     PT long term treatment goals are located in above grid    Frequency of treatments: 2-5x/week x 1-2 weeks. Time in  1553  Time out  1613    Total Treatment Time  0 minutes     Evaluation Time includes thorough review of current medical information, gathering information on past medical history/social history and prior level of function, completion of standardized testing/informal observation of tasks, assessment of data and education on plan of care and goals.     CPT codes:  [x] Low Complexity PT evaluation 62810  [] Moderate Complexity PT evaluation 76025  [] High Complexity PT evaluation

## 2021-05-24 NOTE — LETTER
[place] Department Medicaid  CERTIFICATION OF NECESSITY  FOR NON-EMERGENCY TRANSPORTATION   BY GROUND AMBULANCE      Individual Information   1. Name: Lissa Murphy 2. [place] Medicaid Billing Number: [id number]   7. [facility] [place] [address] [address] [facility]      Transportation Provider Information   4. Provider Name: FABRICIO   5. [place] Medicaid Provider Number:  National Provider Identifier (NPI):      Certification  7. Criteria:  During transport, this individual requires:  [x] Medical treatment or continuous     supervision by an EMT. [] The administration or regulation of oxygen by another person. [] Supervised protective restraint. 8. Period Beginning Date: 2021     9. Length  [x] Not more than 10 day(s)  [] One Year     Additional Information Relevant to Certification   10. Comments or Explanations, If Necessary or Appropriate     HX CVA    NON-AMBULATORY    FREQUENT FALLS   NON-VERBAL     Certifying Practitioner Information   11. Name of Practitioner: SHEA [address] MD   12. [place] Medicaid Provider Number, If Applicable:  [address] Provider Identifier (NPI):      Signature Information   14. Date of Signature: 2021  15. Name of Person Signing:   Jennifer Graham RN    16. Signature and Professional Designation: Jennifer Graham RN 2021 1236     [facility] [address]  Rev. 2015    [address] Encounter Date/Time: 2021 [address] Account: [de-identified]    MRN: [id number]    Patient: Lissa Murphy    Contact Serial #: [id number]      ENCOUNTER          Patient Class: I Private Enc?   No Unit  BD: [address] Service: Med/Surg   Encounter DX: Failure to thrive in ron*   ADM Provider: Nuvia Tom MD   Procedure:     ATT Provider: Nuvia Tom MD   REF Provider:        Admission DX: Failure to thrive in adult, Left hip pain and codes: 783.7, 719.45      PATIENT                 Name: Lissa Murphy : 1970 (51 yrs)   Address: [id number]. Sex: Male   [address] [facility]cker[place] [facility] [address]         Marital Status: Single   Employer: DISABLED         Hinduism:     Primary Care Provider: Marco Turpin MD         Primary Phone: [phone] [address]   Contact Name Legal Guardian? Relationship to Patient Home Phone Work Phone   1. Margareth Davison  2. *No Contact Specified* No    Brother/Sister    [phone]                 GUARANTOR            Guarantor: Sterling Burns     : 1970   Address: [address] [place] [address]. Sex: Male   Sanjeev Garcia [id number]     Relation to Patient: Self       Home Phone: [phone]   Guarantor ID: [id number]       Work Phone:     Guarantor Employer: DISABLED         Status: DISABLED      COVERAGE        PRIMARY INSURANCE   Payor: [address] Plan: [facility] MEDICAID   Payor Address: [facility] DEPARTMENT; [address] [place] [address],  [address] [place] [address], [address] [place] [address]       Group Number: [id number] Insurance Type: INDEMNITY   Subscriber Name: Mau Son : 1970   Subscriber ID: [id number] Pat. Rel. to Sub: Self   SECONDARY INSURANCE   Payor:   Plan:     Payor Address:  ,           Group Number:   Insurance Type:     Subscriber Name:   Subscriber :     Subscriber ID:   Pat.  Rel. to Sub:

## 2021-05-24 NOTE — Clinical Note
Patient Class: Inpatient [101]   REQUIRED: Diagnosis: Failure to thrive in adult [172344]   Estimated Length of Stay: Estimated stay of more than 2 midnights   Future Attending Provider: Luis Felipe Velazquez [[id number]]

## 2021-05-24 NOTE — CONSULTS
Department of Orthopedic Surgery  Resident Consult Note  Reason for Consult: Fall    HISTORY OF PRESENT ILLNESS:       Patient is a 46 y.o. male with a complicated past medical history. 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 His sister is at bedside and is provided the majority of the HPI. Patient lives at home but does not live with his sister. He reportedly has had several falls recently. He has been reluctant to seek medical evaluation but decided to seek medical evaluation today secondary to continued pain. Majority of his pain is isolated to the left hand. Denies any other orthopedic complaints at this time. Past Medical History:        Diagnosis Date    Seizures [facility])      Past Surgical History:    History reviewed. No pertinent surgical history. Current Medications:   Current Facility-Administered Medications: lidocaine PF 1 % injection 10 mL, 10 mL, Intra-articular, See Admin Instructions  Allergies:  Patient has no known allergies. Social History:   TOBACCO:   reports that he has never smoked. He has never used smokeless tobacco.  ETOH:   reports no history of alcohol use. DRUGS:   reports previous drug use. ACTIVITIES OF DAILY LIVING:    OCCUPATION:    Family History:   History reviewed. No pertinent family history.     REVIEW OF SYSTEMS:  CONSTITUTIONAL:  negative for fevers, chills  EYES:  negative for acute changes  HEENT:  negative for acute changes  RESPIRATORY:  negative for dyspnea  CARDIOVASCULAR:  negative for chest pain, palpitations  GASTROINTESTINAL:  negative for nausea, vomiting  GENITOURINARY:  negative for frequency  HEMATOLOGIC/LYMPHATIC:  negative for bleeding and petechiae  MUSCULOSKELETAL:  positive for left hand pain  NEUROLOGICAL: S/p prior stroke with associated right-sided weakness    PHYSICAL EXAM:    VITALS:  /80   Pulse 72   Temp 98.2 °F (36.8 °C)   Resp 18   Wt 180 lb (81.6 kg)   SpO2 97% also present. Degenerative changes present throughout the hand. X-ray right foot: No acute osseous abnormality appreciated. No fracture or dislocations noted. X-ray bilateral hip: Questionable nondisplaced fracture of the left greater trochanter. Will assess further assess via CT scan. No other fractures dislocations appreciated. CT abdomen pelvis: No acute osseous abnormality appreciated. No fracture or dislocations noted. X-ray right radius ulna: No acute osseous abnormality appreciated. No fracture or dislocations noted    X-ray right hand: Questionable nondisplaced first metacarpal base fracture. We will correlate this with clinical examination, patient was not tender to palpation in this region. Degenerative changes present diffusely throughout the entire hand. No other fractures or dislocations appreciated. X-ray left radius ulna: No acute osseous abnormality appreciated. No fractures or dislocations noted. IMPRESSION:  · Left intra-articular dorsal distal phalanx fracture    PLAN:  · Nonweightbearing left upper extremity  · AlumaFoam splint applied to the fifth digit, may replace Coban as needed  · Close monitoring for skin breakdown with AlumaFoam splint commended  · Rest, ice, elevate left upper extremity  · Multimodal pain control per admitting service  · DVT prophylaxis per admitting service  · Follow-up with Dr. Viral Mattson in 2 weeks  · Plan was discussed with attending    All questions were sought and answered during encounter    Electronically signed by Bassam Waddell DO on 5/24/2021 at 3:21 PM      I have seen and evaluated the patient and agree with the above assessment on today's visit. I have performed the key components of the history and physical examination and concur completely with the findings and plans as documented. Agree with ROS, examination, FMH, PMH, PSH, SocHx, and allergies as above. Patient seen and examined. Apparently multiple falls.   The patient is nonverbal.  Is very difficult to get a history. 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 He also has a little dorsal fracture of his distal phalanx which is extra-articular nature. This was appropriately treated in a closed fashion. With the patient's functional status and contractures this will hopefully be the final treatment for this is splinting. We are happy to follow this along sequentially with x-rays and clinical examinations. He will mostly be in the splint for about 4 to 8 weeks depending on his clinical examination. This could also be permanent difficult to tell the acuity of it with his nonverbal status. We also continue to monitor for occult injury. Physical Examination:   General appearance: alert, ill appearing, and in no distress,  normal appearing weight. No visible signs of trauma     Musculoskeletal:   Extremity:  As above examination. Boutonniere deformity left fifth digit. ELECTRONICALLY signed by:    Abilio Gregory MD  5/25/21   This is been dictated utilizing voice recognition software. All efforts have been made to make the note accurate although inadvertent errors may be present.

## 2021-05-24 NOTE — PROGRESS NOTES
[address]      Date:2021                                                  Patient Name: Yossi Radford  MRN: [id number]  : 1970  Room:     Evaluating OT: NETO Hurst, OTR/L  # [id number]    Referring Provider:  Octavia Valle MD  Specific Provider Orders:  Ferna Nab and Treat\"      Diagnosis: Failure to Thrive in Adult     Surgeries this admission: None     Pertinent Medical History: Seizures, CVA w/ Right Hemiplegia    Precautions:  Fall Risk  NWB Left Hand - Fx 5th distal phalanx  Contractures R UE/R Hemiplegia  Non-Verbal - Japanese Primary Language  Incontinent     Assessment of current deficits   [x] Functional mobility   [x]ADLs  [x] Strength               []Cognition   [x] Functional transfers   [x] IADLs         [x] Safety Awareness   [x]Endurance   [x] Fine Coordination              [x] Balance      [] Vision/perception   [x]Sensation    [x]Gross Motor Coordination  [x] ROM  [] Delirium                   [x] Motor Control       OT PLAN OF CARE   OT POC based on physician orders, patient diagnosis and results of clinical assessment    Frequency/Duration 1-3 days/wk for 2 weeks PRN   Specific OT Treatment to include:    Instruction/training on adapted ADL techniques and AE recommendations to increase functional independence within precautions  Training on energy conservation strategies, correct breathing pattern and techniques to improve independence/tolerance for self-care routine  Functional transfer/mobility training/DME recommendations for increased independence, safety, and fall prevention  Patient/Family education to increase follow through with safety techniques and functional independence  Recommendation of environmental modifications for increased safety with functional transfers/mobility and ADLs  Cognitive retraining/development of therapeutic activities to improve problem solving, judgement, memory, and attention for increased safety/participation in ADL/IADL tasks  Sensory re-education to improve body/limb awareness, maintain/improve skin integrity, and improve hand/UE motor function  Visual-perceptual training to improve environmental scanning, visual attention/focus, and oculomotor skills for increased safety/independence with functional transfers/mobility and ADLs  Splinting/positioning for increased function, prevention of contractures, and improve skin integrity  Therapeutic exercise to improve motor endurance, ROM, and functional strength for ADLs/functional transfers  Therapeutic activities to facilitate/challenge dynamic balance, stand tolerance for increased safety and independence with ADLs  Therapeutic activities to facilitate gross/fine motor skills for increased independence with ADLs  Neuro-muscular re-education: facilitation of righting/equilibrium reactions, midline orientation, scapular stability/mobility, normalization of muscle tone, and facilitation of volitional active controled movement  Positioning to improve skin integrity, interaction with environment and functional independence  Manual techniques for edema management    Pt was admitted w/ Pain Right Side and Left hand. Reportedly fell at home 3 days ago - unable to function. Recommended Adaptive Equipment: TBD as pt progresses       Home Living:  Pt lives with his sister in a 2-story house. Bed/bath on the main floor - pt does not use bathroom. Bathroom setup:  NA   Equipment owned:  Hospital Bed, BSC/urinal, W/C    Available Family Assist:  Sister provides assist PRN - Works(?) - pt home alone(?)    Prior Level of Function:  Sister reported she assists pt w/ Bed-Level dressing/Bathing/use of urinal.  Transfers to [facility] to move bowels. SUP/Assist for stand-pivot transfers Bed<>W/C.     Driving:  No  Occupation:  None    Pain Level:  No indication of pain w/ Manual 12973     Neuro Re-Ed 14487       Non-Billable Time              Evaluation Time additionally includes thorough review of current medical information, gathering information on past medical history/social history and prior level of function, completion of standardized testing/informal observation of tasks, assessment of data and education on plan of care and goals.             Breonna Christensen, MOT, OTR/L  # [id number]

## 2021-05-25 ENCOUNTER — APPOINTMENT (OUTPATIENT)
Dept: GENERAL RADIOLOGY | Age: 51
DRG: 421 | End: 2021-05-25
Payer: COMMERCIAL

## 2021-05-25 PROBLEM — S62.607A FRACTURE OF PHALANX OF LEFT LITTLE FINGER: Status: ACTIVE | Noted: 2021-05-25

## 2021-05-25 LAB
ANION GAP SERPL CALCULATED.3IONS-SCNC: 10 MMOL/L (ref 7–16)
ANION GAP SERPL CALCULATED.3IONS-SCNC: 11 MMOL/L (ref 7–16)
BUN BLDV-MCNC: 11 MG/DL (ref 6–20)
BUN BLDV-MCNC: 8 MG/DL (ref 6–20)
CALCIUM SERPL-MCNC: 6.6 MG/DL (ref 8.6–10.2)
CALCIUM SERPL-MCNC: 8.9 MG/DL (ref 8.6–10.2)
CHLORIDE BLD-SCNC: 100 MMOL/L (ref 98–107)
CHLORIDE BLD-SCNC: 112 MMOL/L (ref 98–107)
CO2: 19 MMOL/L (ref 22–29)
CO2: 22 MMOL/L (ref 22–29)
CREAT SERPL-MCNC: 0.7 MG/DL (ref 0.7–1.2)
CREAT SERPL-MCNC: 0.9 MG/DL (ref 0.7–1.2)
GFR AFRICAN AMERICAN: >60
GFR AFRICAN AMERICAN: >60
GFR NON-AFRICAN AMERICAN: >60 ML/MIN/1.73
GFR NON-AFRICAN AMERICAN: >60 ML/MIN/1.73
GLUCOSE BLD-MCNC: 102 MG/DL (ref 74–99)
GLUCOSE BLD-MCNC: 306 MG/DL (ref 74–99)
HCT VFR BLD CALC: 39.1 % (ref 37–54)
HEMOGLOBIN: 13.4 G/DL (ref 12.5–16.5)
MAGNESIUM: 1.5 MG/DL (ref 1.6–2.6)
MCH RBC QN AUTO: 27.6 PG (ref 26–35)
MCHC RBC AUTO-ENTMCNC: 34.3 % (ref 32–34.5)
MCV RBC AUTO: 80.6 FL (ref 80–99.9)
PDW BLD-RTO: 12.4 FL (ref 11.5–15)
PLATELET # BLD: 230 E9/L (ref 130–450)
PMV BLD AUTO: 9.8 FL (ref 7–12)
POTASSIUM REFLEX MAGNESIUM: 2.7 MMOL/L (ref 3.5–5)
POTASSIUM SERPL-SCNC: 4.6 MMOL/L (ref 3.5–5)
POTASSIUM SERPL-SCNC: 4.8 MMOL/L (ref 3.5–5)
RBC # BLD: 4.85 E12/L (ref 3.8–5.8)
SODIUM BLD-SCNC: 133 MMOL/L (ref 132–146)
SODIUM BLD-SCNC: 141 MMOL/L (ref 132–146)
WBC # BLD: 7.9 E9/L (ref 4.5–11.5)

## 2021-05-25 PROCEDURE — 96366 THER/PROPH/DIAG IV INF ADDON: CPT

## 2021-05-25 PROCEDURE — G0378 HOSPITAL OBSERVATION PER HR: HCPCS

## 2021-05-25 PROCEDURE — 99253 IP/OBS CNSLTJ NEW/EST LOW 45: CPT | Performed by: ORTHOPAEDIC SURGERY

## 2021-05-25 PROCEDURE — 84132 ASSAY OF SERUM POTASSIUM: CPT

## 2021-05-25 PROCEDURE — 85027 COMPLETE CBC AUTOMATED: CPT

## 2021-05-25 PROCEDURE — 6370000000 HC RX 637 (ALT 250 FOR IP): Performed by: INTERNAL MEDICINE

## 2021-05-25 PROCEDURE — 83735 ASSAY OF MAGNESIUM: CPT

## 2021-05-25 PROCEDURE — 80048 BASIC METABOLIC PNL TOTAL CA: CPT

## 2021-05-25 PROCEDURE — 96365 THER/PROPH/DIAG IV INF INIT: CPT

## 2021-05-25 PROCEDURE — 96372 THER/PROPH/DIAG INJ SC/IM: CPT

## 2021-05-25 PROCEDURE — 96376 TX/PRO/DX INJ SAME DRUG ADON: CPT

## 2021-05-25 PROCEDURE — 36415 COLL VENOUS BLD VENIPUNCTURE: CPT

## 2021-05-25 PROCEDURE — 74018 RADEX ABDOMEN 1 VIEW: CPT

## 2021-05-25 PROCEDURE — 6360000002 HC RX W HCPCS: Performed by: INTERNAL MEDICINE

## 2021-05-25 PROCEDURE — 96375 TX/PRO/DX INJ NEW DRUG ADDON: CPT

## 2021-05-25 PROCEDURE — 1200000000 HC SEMI PRIVATE

## 2021-05-25 PROCEDURE — 2580000003 HC RX 258: Performed by: INTERNAL MEDICINE

## 2021-05-25 RX ORDER — POTASSIUM CHLORIDE 20 MEQ/1
40 TABLET, EXTENDED RELEASE ORAL
Status: COMPLETED | OUTPATIENT
Start: 2021-05-25 | End: 2021-05-25

## 2021-05-25 RX ORDER — POTASSIUM CHLORIDE 20 MEQ/1
40 TABLET, EXTENDED RELEASE ORAL 2 TIMES DAILY
Status: DISCONTINUED | OUTPATIENT
Start: 2021-05-25 | End: 2021-05-26 | Stop reason: HOSPADM

## 2021-05-25 RX ORDER — POTASSIUM CHLORIDE 20 MEQ/1
40 TABLET, EXTENDED RELEASE ORAL PRN
Status: DISCONTINUED | OUTPATIENT
Start: 2021-05-25 | End: 2021-05-25

## 2021-05-25 RX ORDER — POTASSIUM CHLORIDE 7.45 MG/ML
10 INJECTION INTRAVENOUS PRN
Status: DISCONTINUED | OUTPATIENT
Start: 2021-05-25 | End: 2021-05-25

## 2021-05-25 RX ORDER — POTASSIUM CHLORIDE 7.45 MG/ML
10 INJECTION INTRAVENOUS
Status: COMPLETED | OUTPATIENT
Start: 2021-05-25 | End: 2021-05-25

## 2021-05-25 RX ORDER — MAGNESIUM SULFATE IN WATER 40 MG/ML
4000 INJECTION, SOLUTION INTRAVENOUS ONCE
Status: COMPLETED | OUTPATIENT
Start: 2021-05-25 | End: 2021-05-25

## 2021-05-25 RX ADMIN — ENOXAPARIN SODIUM 40 MG: 40 INJECTION SUBCUTANEOUS at 08:09

## 2021-05-25 RX ADMIN — POTASSIUM CHLORIDE 10 MEQ: 10 INJECTION, SOLUTION INTRAVENOUS at 08:08

## 2021-05-25 RX ADMIN — POTASSIUM CHLORIDE 40 MEQ: 1500 TABLET, EXTENDED RELEASE ORAL at 10:17

## 2021-05-25 RX ADMIN — POTASSIUM CHLORIDE 40 MEQ: 1500 TABLET, EXTENDED RELEASE ORAL at 08:08

## 2021-05-25 RX ADMIN — MAGNESIUM SULFATE HEPTAHYDRATE 4000 MG: 40 INJECTION, SOLUTION INTRAVENOUS at 10:29

## 2021-05-25 RX ADMIN — POTASSIUM CHLORIDE 40 MEQ: 20 TABLET, EXTENDED RELEASE ORAL at 21:02

## 2021-05-25 RX ADMIN — POTASSIUM CHLORIDE 10 MEQ: 10 INJECTION, SOLUTION INTRAVENOUS at 09:35

## 2021-05-25 RX ADMIN — Medication 10 ML: at 21:03

## 2021-05-25 ASSESSMENT — PAIN SCALES - GENERAL
PAINLEVEL_OUTOF10: 0

## 2021-05-25 ASSESSMENT — PAIN SCALES - WONG BAKER
WONGBAKER_NUMERICALRESPONSE: 0

## 2021-05-25 NOTE — PROGRESS NOTES
Patient's potassium level is 2.74. Attempted to call Dr Sherren Batman through [facility] but the answering service said that  They were unable to connect to provider at this time.

## 2021-05-25 NOTE — H&P
[address] [facility]  History and Physical      CHIEF COMPLAINT:  Fall at home       Patient of Virgil Pineda MD presents with:  Failure to thrive in adult    History of Present Illness:   Patient is a 46year old male with a past medical history of seizures, and CVA. Patient presented to the ER via EMS for frequent falls at home. Patient is non verbal, information obtained through EHR, and RN. Patient fell last Friday at home and injured his left 5th digit and abrasion on right eye. Patient can nod head to questions asked. Patient admits pain, and being nonambulatory. Patient has been receiving pain medication with relief. Family is unable to care for patient any longer and he needs placement. Patient denies any chest pain, shortness of breath, fever, and chills. ER workup XR Left hand acute nondisplaced dorsal plate avulsion fracture of the distal phalanx of 5th finger. REVIEW OF SYSTEMS:  Pertinent negatives are above in HPI. 10 point ROS otherwise negative. Past Medical History:   Diagnosis Date    Seizures ([facility] [place] [address]          History reviewed. No pertinent surgical history. Medications Prior to Admission:    Medications Prior to Admission: amLODIPine (NORVASC) 10 MG tablet, Take 10 mg by mouth daily   ASPIRIN LOW DOSE 81 MG EC tablet, Take 81 mg by mouth daily   carvedilol (COREG) 25 MG tablet, Take 25 mg by mouth 2 times daily (with meals)   escitalopram (LEXAPRO) 20 MG tablet, Take 20 mg by mouth daily   TRESIBA FLEXTOUCH 100 UNIT/ML SOPN, Inject 25 Units into the skin daily   levETIRAcetam (KEPPRA) 500 MG tablet, Take 500 mg by mouth 2 times daily     Note that the patient's home medications were reviewed and the above list is accurate to the best of my knowledge at the time of the exam.    Allergies:    Patient has no known allergies. Social History:    reports that he has never smoked. He has never used smokeless tobacco. He reports previous drug use.  He personally saw, examined and provided care for the patient. Radiographs, labs and medication list were reviewed by me independently. The case was discussed in detail and plans for care were established. Review of [address] [place] [address], documentation was conducted and revisions were made as appropriate directly by me. I agree with the above documented exam, problem list, and plan of care.      Cem Lucero MD  12:04 PM  5/25/2021

## 2021-05-25 NOTE — DISCHARGE INSTR - COC
Assisted  Med Delivery   whole    Wound Care Documentation and Therapy:  Wound 21 Pedal Anterior;Right 1cm x 1cm round (Active)   Dressing Status Clean;Dry; Intact 21 0757   Wound Assessment Dry;Pink/red 21   Drainage Amount None 21   Number of days: 0        Elimination:  Continence:   · Bowel: No  · Bladder: No  Urinary Catheter: None   Colostomy/Ileostomy/Ileal Conduit: No       Date of Last BM: ***    Intake/Output Summary (Last 24 hours) at 2021 1144  Last data filed at 2021 0935  Gross per 24 hour   Intake 1180 ml   Output --   Net 1180 ml     I/O last 3 completed shifts: In: 1000 [IV Piggyback:1000]  Out: -     Safety Concerns:     History of Falls (last 30 days) and At Risk for Falls    Impairments/Disabilities:      None    Nutrition Therapy:  Current Nutrition Therapy:   - Oral Diet:  General    Routes of Feeding: Oral  Liquids: No Restrictions  Daily Fluid Restriction: no  Last Modified Barium Swallow with Video (Video Swallowing Test): not done    Treatments at the Time of Hospital Discharge:   Respiratory Treatments: ***  Oxygen Therapy:  is not on home oxygen therapy.   Ventilator:    - No ventilator support    Rehab Therapies: Physical Therapy and Occupational Therapy  Weight Bearing Status/Restrictions: [address] [facility] Weight Bearin} DANII PONCE  Other Medical Equipment (for information only, NOT a DME order):  {EQUIPMENT:[id number]}  Other Treatments: ***    Patient's personal belongings (please select all that are sent with patient):  {CHP DME Belongings:[id number]}    RN SIGNATURE:  {Esignature:[id number]}    CASE MANAGEMENT/SOCIAL WORK SECTION    Inpatient Status Date: ***    Readmission Risk Assessment Score:  Readmission Risk              Risk of Unplanned Readmission:  9           Discharging to Facility/ Agency   · Name: [facility]  · Address:  · Phone:  · Fax:    Dialysis Facility (if applicable)   · Name:  · Address:  · Dialysis

## 2021-05-25 NOTE — PROGRESS NOTES
Called sister to have her answer admission questions. She had a hard time understanding the questions. Filled out questionaire best to our ability.

## 2021-05-26 VITALS
SYSTOLIC BLOOD PRESSURE: 133 MMHG | RESPIRATION RATE: 19 BRPM | WEIGHT: 180 LBS | OXYGEN SATURATION: 97 % | HEART RATE: 90 BPM | DIASTOLIC BLOOD PRESSURE: 88 MMHG | TEMPERATURE: 97.5 F

## 2021-05-26 LAB
EKG ATRIAL RATE: 69 BPM
EKG P AXIS: 57 DEGREES
EKG P-R INTERVAL: 172 MS
EKG Q-T INTERVAL: 430 MS
EKG QRS DURATION: 78 MS
EKG QTC CALCULATION (BAZETT): 460 MS
EKG R AXIS: -41 DEGREES
EKG T AXIS: 12 DEGREES
EKG VENTRICULAR RATE: 69 BPM
SARS-COV-2, NAAT: NOT DETECTED

## 2021-05-26 PROCEDURE — 2580000003 HC RX 258: Performed by: INTERNAL MEDICINE

## 2021-05-26 PROCEDURE — 6370000000 HC RX 637 (ALT 250 FOR IP): Performed by: INTERNAL MEDICINE

## 2021-05-26 PROCEDURE — 93010 ELECTROCARDIOGRAM REPORT: CPT | Performed by: INTERNAL MEDICINE

## 2021-05-26 PROCEDURE — 87635 SARS-COV-2 COVID-19 AMP PRB: CPT

## 2021-05-26 PROCEDURE — G0378 HOSPITAL OBSERVATION PER HR: HCPCS

## 2021-05-26 PROCEDURE — 96372 THER/PROPH/DIAG INJ SC/IM: CPT

## 2021-05-26 PROCEDURE — 6360000002 HC RX W HCPCS: Performed by: INTERNAL MEDICINE

## 2021-05-26 RX ORDER — HYDROCODONE BITARTRATE AND ACETAMINOPHEN 5; 325 MG/1; MG/1
1 TABLET ORAL EVERY 8 HOURS PRN
Qty: 9 TABLET | Refills: 0 | Status: SHIPPED | OUTPATIENT
Start: 2021-05-26 | End: 2021-05-29

## 2021-05-26 RX ADMIN — POTASSIUM CHLORIDE 40 MEQ: 20 TABLET, EXTENDED RELEASE ORAL at 09:06

## 2021-05-26 RX ADMIN — ENOXAPARIN SODIUM 40 MG: 40 INJECTION SUBCUTANEOUS at 09:06

## 2021-05-26 RX ADMIN — Medication 10 ML: at 09:06

## 2021-05-26 ASSESSMENT — PAIN SCALES - WONG BAKER
WONGBAKER_NUMERICALRESPONSE: 0

## 2021-05-26 ASSESSMENT — PAIN SCALES - GENERAL
PAINLEVEL_OUTOF10: 0

## 2021-05-26 NOTE — PROGRESS NOTES
Attempted to call nurse to nurse to [facility] talked to  maryjane and left phone number with her for someone to return call to get nurse to nurse report. When maryjane transferred me to talk to the unit the phone continued to ring with no answer.

## 2021-05-26 NOTE — DISCHARGE SUMMARY
Physician Discharge Summary     Patient ID:  Bennie Chapa  [id number]  46 y.o.  1970    Admit date: 5/24/2021    Discharge date and time: 5/26/2021    Admission Diagnoses:   Patient Active Problem List   Diagnosis    Failure to thrive in adult    Left hip pain    Renal cyst    Fracture of phalanx of left little finger       Discharge Diagnoses: Failure to thrive    Consults: orthopedic surgery    Procedures: None    Hospital Course: The patient is a 46 y.o. male of Tila Nguyen MD with significant past medical history of seizures who presents with frequent falls at home. Family is unable to care for patient any longer and he needed placement. Patient had fracture left 5th digit and was splinted by orthopedic surgery. Patient was discharged to [facility] with a prescription for Oxycodone. Recent Labs     05/24/21  1002 05/25/21  0445   WBC 7.2 7.9   HGB 15.5 13.4   HCT 45.2 39.1    230       Recent Labs     05/24/21  1320 05/25/21  0445 05/25/21  1113 05/25/21  1854    141 133  --    K 4.1 2.7* 4.6 4.8    112* 100  --    CO2 21* 19* 22  --    BUN 13 8 11  --    CREATININE 0.9 0.7 0.9  --    CALCIUM 8.8 6.6* 8.9  --        XR RADIUS ULNA LEFT (2 VIEWS)    Result Date: 5/24/2021  EXAMINATION: TWO XRAY VIEWS OF THE LEFT FOREARM 5/24/2021 11:15 am COMPARISON: None. HISTORY: ORDERING SYSTEM PROVIDED HISTORY: r/o fx TECHNOLOGIST PROVIDED HISTORY: Reason for exam:->r/o fx What reading provider will be dictating this exam?->CRC FINDINGS: There is no acute fracture of the left radius or ulna. The left elbow is intact. There is an old ununited fracture of the ulnar styloid process. The radiocarpal joint is unremarkable.      1. There is no acute fracture dislocation of the left forearm 2. Old ununited fracture of the ulnar styloid process     XR RADIUS ULNA RIGHT (2 VIEWS)    Result Date: 5/24/2021  EXAMINATION: TWO XRAY VIEWS OF THE RIGHT FOREARM 5/24/2021 11:14 am COMPARISON: None. HISTORY: ORDERING SYSTEM PROVIDED HISTORY: r/o fx TECHNOLOGIST PROVIDED HISTORY: Reason for exam:->r/o fx What reading provider will be dictating this exam?->CRC FINDINGS: There is no acute fracture of the right radius or ulna. Due to the patient's medical condition the AP view is suboptimal. There are degenerative changes of the radiocarpal joint and intercarpal joints. 1. There is no acute fracture of the right radius or ulna 2. Degenerative changes of the radiocarpal joint and intercarpal joints. 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 In addition, nondisplaced dorsal plate avulsion fracture at the base of the distal phalanx. Dorsal intravenous cannula and place at the 4th MCP level. Chronic appearing fracture of the ulnar styloid process. 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 XR HAND LEFT (MIN 3 VIEWS)    Result Date: 5/24/2021  EXAMINATION: THREE XRAY VIEWS OF THE LEFT HAND 5/24/2021 8:12 am COMPARISON: None. HISTORY: ORDERING SYSTEM PROVIDED HISTORY: r/o fx TECHNOLOGIST PROVIDED HISTORY: Reason for exam:->r/o fx What reading provider will be dictating this exam?->CRC FINDINGS: The bones appear osteopenic. There is flexion of the a 5th proximal interphalangeal joint and extension of the 5th distal interphalangeal joint, resulting in boutonniere deformity.   Mild-moderate arthritic changes are present involving the interphalangeal joints and the 1st metacarpophalangeal joint, compatible with osteoarthritis. A fracture of the ulnar styloid appears well corticated, suggesting nonacute. The bones otherwise appear intact. No evidence of dislocation. 1. Ulnar styloid fracture is favored to be nonacute. The bones otherwise appear intact. 2. Boutonniere deformity of the 5th digit may be seen with rheumatoid arthritis or tendinopathy/rupture of the central slip of the extensor digitorum. 3. Arthritic changes are compatible with osteoarthritis. XR HAND RIGHT (MIN 3 VIEWS)    Result Date: 5/24/2021  EXAMINATION: THREE XRAY VIEWS OF THE RIGHT HAND 5/24/2021 11:16 am COMPARISON: None. HISTORY: ORDERING SYSTEM PROVIDED HISTORY: r/o fx TECHNOLOGIST PROVIDED HISTORY: Reason for exam:->r/o fx What reading provider will be dictating this exam?->CRC FINDINGS: The right wrist is chronically flexed. Visualization is limited. There is a vague cortical defect seen through the base of the 1st metacarpal medially. This is seen on the lateral view. No other gross fracture is noted. There are degenerative changes of the joints of the right wrist.     1. Questionable nondisplaced subtle fracture through the base of the 1st metacarpal only seen on the lateral view. A vague cortical step-off is noted. Please obtain additional views of the right thumb. XR HIP BILATERAL W AP PELVIS (2 VIEWS)    Result Date: 5/24/2021  EXAMINATION: ONE XRAY VIEW OF THE PELVIS AND TWO XRAY VIEWS OF EACH OF THE BILATERAL HIPS 5/24/2021 11:14 am COMPARISON: None. HISTORY: ORDERING SYSTEM PROVIDED HISTORY: r/o fx TECHNOLOGIST PROVIDED HISTORY: Reason for exam:->r/o fx What reading provider will be dictating this exam?->CRC FINDINGS: There is a questionable subtle lucency seen within the greater trochanter on the left. No fracture lucency is seen extending through the trochanter or into the lesser trochanter. There is no gross right rib fracture.  There is significant degenerative changes of by overlying stool and bowel gas. No obvious nephroureterolithiasis within this limitation. No radiographic evidence of organomegaly. No acute osseous abnormality. No acute radiographic abnormality in the abdomen or pelvis. CT Head WO Contrast    Result Date: 5/24/2021  EXAMINATION: CT OF THE HEAD WITHOUT CONTRAST  5/24/2021 11:28 am TECHNIQUE: CT of the head was performed without the administration of intravenous contrast. Dose modulation, iterative reconstruction, and/or weight based adjustment of the mA/kV was utilized to reduce the radiation dose to as low as reasonably achievable. COMPARISON: None. HISTORY: ORDERING SYSTEM PROVIDED HISTORY: r/o bleed/fx TECHNOLOGIST PROVIDED HISTORY: Reason for exam:->r/o bleed/fx Has a \"code stroke\" or \"stroke alert\" been called? ->No Decision Support Exception - unselect if not a suspected or confirmed emergency medical condition->Emergency Medical Condition (MA) What reading provider will be dictating this exam?->CRC FINDINGS: BRAIN/VENTRICLES: There is no acute intracranial hemorrhage, mass effect or midline shift. No abnormal extra-axial fluid collection. The gray-white differentiation is maintained without evidence of an acute infarct. There is no evidence of hydrocephalus. The ventricles, cisterns and sulci are prominent consistent with atrophy. There is decreased attenuation within the periventricular white matter consistent with periventricular leukomalacia. There is extensive encephalomalacia is seen within the left cerebral hemisphere consistent with an old left MCA distribution infarct. There is ex vacuo dilatation of the left lateral ventricle. There is also encephalomalacia is seen within the left cerebellum consistent with an old infarct. ORBITS: The visualized portion of the orbits demonstrate no acute abnormality. SINUSES: The visualized paranasal sinuses and mastoid air cells demonstrate no acute abnormality.  SOFT TISSUES/SKULL:  No acute abnormality of the visualized skull or soft tissues. 1.  There is no acute intracranial abnormality. Specifically, there is no intracranial hemorrhage. 2. Atrophy and periventricular leukomalacia, 3. 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 4. Old infarct within the left cerebellum. CT Cervical Spine WO Contrast    Result Date: 5/24/2021  EXAMINATION: CT OF THE CERVICAL SPINE WITHOUT CONTRAST 5/24/2021 8:28 am TECHNIQUE: CT of the cervical spine was performed without the administration of intravenous contrast. Multiplanar reformatted images are provided for review. Dose modulation, iterative reconstruction, and/or weight based adjustment of the mA/kV was utilized to reduce the radiation dose to as low as reasonably achievable. COMPARISON: None. HISTORY: ORDERING SYSTEM PROVIDED HISTORY: r/o bleed TECHNOLOGIST PROVIDED HISTORY: Reason for exam:->r/o bleed Decision Support Exception - unselect if not a suspected or confirmed emergency medical condition->Emergency Medical Condition (MA) What reading provider will be dictating this exam?->CRC FINDINGS: BONES/ALIGNMENT: There is no acute fracture or traumatic malalignment. Lucencies in the left C3, C5 and C6 vertebral bodies and the anterior C3 and C4 spinous processes are without aggressive imaging features. DEGENERATIVE CHANGES: There is mild degenerative narrowing and superior osteophytosis of the predental space. There are mild multilevel degenerative disc changes with predominantly anterior osteophytosis, most prominent at C4-C5. No evidence of significant spinal canal stenosis. SOFT TISSUES: There is no prevertebral soft tissue swelling. There is ossification of the nuchal ligament. A right thyroid nodule measures up to 1.8 cm. The brain is discussed in a separate report. There is moderate calcific atherosclerosis at the carotid artery bifurcations, right greater than left. 1.  No acute osseous abnormality is identified.  2. An incidentally noted thyroid nodule measures up to 1.8 cm. Please see the recommendation section below. 3. Nonspecific lucencies in the cervical spine are indeterminate without aggressive imaging features. Please see the recommendation section below. RECOMMENDATIONS: 1. 1.8 cm incidental thyroid nodule. Recommend thyroid US. Reference: J Am Bianca Radiol. 2015 Feb;12(2): 143-50 2. Nonspecific osseous lucencies are without aggressive imaging features. If there are risk factors for osseous metastatic disease in this individual, MRI cervical spine with and without contrast may be considered for characterization. CT THORACIC SPINE WO CONTRAST    Result Date: 5/24/2021  EXAMINATION: CT OF THE THORACIC SPINE WITHOUT CONTRAST  5/24/2021 8:28 am: TECHNIQUE: CT of the thoracic spine was performed without the administration of intravenous contrast. Multiplanar reformatted images are provided for review. Dose modulation, iterative reconstruction, and/or weight based adjustment of the mA/kV was utilized to reduce the radiation dose to as low as reasonably achievable. COMPARISON: None. HISTORY: ORDERING SYSTEM PROVIDED HISTORY: r/o fx TECHNOLOGIST PROVIDED HISTORY: Reason for exam:->r/o fx What reading provider will be dictating this exam?->CRC FINDINGS: BONES/ALIGNMENT: No acute fracture or traumatic malalignment. DEGENERATIVE CHANGES: Mild-moderate degenerative disc changes are most prominent in the midthoracic spine. No evidence of significant spinal canal stenosis. SOFT TISSUES: The visualized superficial soft tissues are unremarkable. A nodule partially imaged in the right lobe of the thyroid measures 1.3 cm in the imaged portions; based on this measurement, no follow-up is necessary. A calcified granuloma is present in the left lower lobe. There is mild scattered atelectasis/scarring in the imaged portions of the lungs. No acute osseous abnormality is identified.      CT LUMBAR SPINE WO CONTRAST    Result Date: 5/24/2021  EXAMINATION: CT OF THE LUMBAR SPINE WITHOUT CONTRAST  5/24/2021 TECHNIQUE: CT of the lumbar spine was performed without the administration of intravenous contrast. Multiplanar reformatted images are provided for review. Dose modulation, iterative reconstruction, and/or weight based adjustment of the mA/kV was utilized to reduce the radiation dose to as low as reasonably achievable. COMPARISON: None HISTORY: ORDERING SYSTEM PROVIDED HISTORY: r/o fx TECHNOLOGIST PROVIDED HISTORY: Reason for exam:->r/o fx Decision Support Exception - unselect if not a suspected or confirmed emergency medical condition->Emergency Medical Condition (MA) What reading provider will be dictating this exam?->CRC FINDINGS: Within the limits of non-contrast, non-myelographic CT technique: OSSEOUS STRUCTURES/ALIGNMENT: Chronic, multilevel, minimal to mild, asymmetric vertebral body compression deformities are most notable right laterally at L4, followed by adjacent levels, further contributing to mild levo-lordosis of the visualized spine, with a Howell angle of approximately 9 degrees. DEGENERATIVE DISEASE: Mild osteoarthritic degenerative change is present at both sacroiliac joints. At L5/S1, there is at least moderate circumferential disc-osteophyte complex, asymmetric posterior, and moderate/severe bilateral facet arthropathy, slightly worse on the right. These further contribute to at least mild central spinal canal stenosis and moderate/severe bilateral neural foraminal narrowing, slightly worse on the right. Somewhat lesser similar findings are noted at L4/5, and generally progressively lessen superiorly throughout the remainder of the visualized spine. Minimal to mild, anterior and bilateral anterolateral, protruding disc-osteophyte complexes are further superimposed throughout the visualized spine, without additional identified significant central spinal canal stenosis or neural foraminal narrowing.  SPINAL CORD/CONUS MEDULLARIS/CAUDA EQUINA: Not well demonstrated by this technique. SOFT TISSUES: There is moderate inferoposterior paraspinal muscular atrophy, lessening superiorly. INCIDENTAL: Minimal to mild atherosclerotic calcification is scattered throughout the visualized aorta and its major branches. There is mild/moderate fatty atrophy of the pancreatic head, lessening toward its tail. There is minimal bilateral perinephric fat stranding. An approximate 1.8 cm diameter hypodense lesion is associated with the anteromedial cortex of the right renal lower pole, featuring internal density of approximately 6 Hounsfield units. Though incompletely characterized, per ACR guidelines, this is statistically most likely represents a benign cyst, and no further workup is required. .     1. Chronic, multilevel, minimal to mild, asymmetric vertebral body compression deformities are most notable right laterally at L4, followed by adjacent levels, further contributing to mild levo-lordosis of the visualized spine, with a Howell angle of approximately 9 degrees. 2. Multilevel degenerative disease, overall most notable at L5/S1, where there is at least mild central spinal canal stenosis and moderate/severe bilateral neural foraminal narrowing, slightly worse on the right. 3.  Moderate inferoposterior paraspinal muscular atrophy, lessening superiorly. 4.  Incidental findings, as described. . RECOMMENDATIONS: 1. If unexplained symptoms persist, consider noncontrast MRI of the lumbar spine for further evaluation. .     CT ABDOMEN PELVIS W IV CONTRAST Additional Contrast? None    Result Date: 5/24/2021  EXAMINATION: CT OF THE ABDOMEN AND PELVIS WITH CONTRAST 5/24/2021 11:28 am TECHNIQUE: CT of the abdomen and pelvis was performed with the administration of intravenous contrast. Multiplanar reformatted images are provided for review.  Dose modulation, iterative reconstruction, and/or weight based adjustment of the mA/kV was utilized to reduce the radiation dose to as low as reasonably achievable. COMPARISON: None. HISTORY: ORDERING SYSTEM PROVIDED HISTORY: r/o bleed/fx TECHNOLOGIST PROVIDED HISTORY: Additional Contrast?->None Reason for exam:->r/o bleed/fx Decision Support Exception - unselect if not a suspected or confirmed emergency medical condition->Emergency Medical Condition (MA) What reading provider will be dictating this exam?->CRC FINDINGS: Organs: Liver: Unremarkable. Gallbladder: Unremarkable. Pancreas: Unremarkable. Spleen:  Unremarkable. Adrenals: Unremarkable. Kidneys: Unremarkable. GI/Bowel: No bowel wall thickening or obstruction. Normal appendix. Pelvis: The urinary bladder and the prostate are grossly unremarkable. Peritoneum/Retroperitoneum: No lymphadenopathy. Mild calcified atherosclerosis is seen in the aorta. No aneurysm. Bones/Soft Tissues: The visualized bones are intact without fracture or focal lesion. Somewhat convex borders of the femoral head and neck junctions laterally may predispose to femoroacetabular impingement. Clinical correlation is needed. No traumatic or other acute abnormality is seen. XR CHEST PORTABLE    Result Date: 5/24/2021  EXAMINATION: ONE XRAY VIEW OF THE CHEST 5/24/2021 11:18 am COMPARISON: None. HISTORY: ORDERING SYSTEM PROVIDED HISTORY: r/o fx TECHNOLOGIST PROVIDED HISTORY: Reason for exam:->r/o fx What reading provider will be dictating this exam?->CRC FINDINGS: The heart is enlarged. There are no findings of failure. Suboptimal inspiration was obtained for the chest x-ray. There is no pleural effusion. 1. Cardiomegaly. There is no evidence of failure or pneumonia. CTA CHEST W CONTRAST    Result Date: 5/24/2021  EXAMINATION: CTA OF THE CHEST 5/24/2021 11:28 am TECHNIQUE: CTA of the chest was performed after the administration of intravenous contrast.  Multiplanar reformatted images are provided for review. MIP images are provided for review.  Dose modulation, iterative known as: [id number]  Take 1 tablet by mouth every 8 hours as needed for Pain for up to 3 days. CONTINUE taking these medications    amLODIPine 10 MG tablet  Commonly known as: NORVASC     Aspirin Low Dose 81 MG EC tablet  Generic drug: aspirin     carvedilol 25 MG tablet  Commonly known as: COREG     escitalopram 20 MG tablet  Commonly known as: LEXAPRO     levETIRAcetam 500 MG tablet  Commonly known as: KEPPRA     Tresiba FlexTouch 100 UNIT/ML Sopn  Generic drug: Insulin Degludec           Where to Get Your Medications      You can get these medications from any pharmacy    Bring a paper prescription for each of these medications  · HYDROcodone-acetaminophen 5-325 MG per tablet       Activity: activity as tolerated  Diet: cardiac diet    Pt has been advised to: Follow-up with Faviola Sebastian MD in 1 week. Follow-up with consultants as recommended by them    Note that over 30 minutes was spent in preparing discharge papers, discussing discharge with patient, medication review, etc.    Signed:  QI Rodriguez - CNP  5/26/2021  3:49 PM     Above note edited to reflect my thoughts     I personally saw, examined and provided care for the patient. Radiographs, labs and medication list were reviewed by me independently. The case was discussed in detail and plans for care were established. Review of [address] [place] [address], documentation was conducted and revisions were made as appropriate directly by me. I agree with the above documented exam, problem list, and plan of care.      Michael Whitehead MD  5/26/2021

## 2021-05-28 DIAGNOSIS — S62.637A CLOSED DISPLACED FRACTURE OF DISTAL PHALANX OF LEFT LITTLE FINGER, INITIAL ENCOUNTER: Primary | ICD-10-CM

## 2021-06-11 ENCOUNTER — OFFICE VISIT (OUTPATIENT)
Dept: ORTHOPEDIC SURGERY | Age: 51
End: 2021-06-11
Payer: COMMERCIAL

## 2021-06-11 ENCOUNTER — HOSPITAL ENCOUNTER (OUTPATIENT)
Dept: GENERAL RADIOLOGY | Age: 51
Discharge: HOME | End: 2021-06-13
Payer: COMMERCIAL

## 2021-06-11 DIAGNOSIS — S62.637A CLOSED DISPLACED FRACTURE OF DISTAL PHALANX OF LEFT LITTLE FINGER, INITIAL ENCOUNTER: ICD-10-CM

## 2021-06-11 DIAGNOSIS — S62.667D CLOSED NONDISPLACED FRACTURE OF DISTAL PHALANX OF LEFT LITTLE FINGER WITH ROUTINE HEALING, SUBSEQUENT ENCOUNTER: Primary | ICD-10-CM

## 2021-06-11 PROCEDURE — 99024 POSTOP FOLLOW-UP VISIT: CPT | Performed by: ORTHOPAEDIC SURGERY

## 2021-06-11 PROCEDURE — 73130 X-RAY EXAM OF HAND: CPT

## 2021-06-11 PROCEDURE — 99212 OFFICE O/P EST SF 10 MIN: CPT

## 2021-06-11 NOTE — PATIENT INSTRUCTIONS
NEW ORDERS FOR FACILITY  Patient was unable to participate in today's visit with use of a  in today's visit  We request that in 4 weeks a repeat XR of the left hand is obtained through [facility] to recheck that the left little finger fracture is healing.  Order attached    Based upon these xray results we will determine if patient needs to follow up in office    Continue no lifting/pushing/pulling/resisted gripping to the left hand until repeat xrays completed    Pain control per facility physician    Ice/elevation for swelling control

## 2021-06-11 NOTE — PROGRESS NOTES
Patient here today for a hospital follow up from 05/24/2021, left hand 5th distal phalanx fx. *patient does not verbalize or use ASL for his visit. Patient is from a facility and is alone for visit.  Unable to obtain clinical information prior to provider consulting with the patient**

## 2021-06-11 NOTE — PROGRESS NOTES
Orthopaedic Resident Clinic Note     S: Nolberto Laurent is a 46 y.o. who is here today for a follow up from the Emergency department. The patient is nonverbal at base line from a previous stroke. , sign laungage  were both attempted. The patient did non respond. He presents alone with out any family to the clinic today. He was found to have an intraarticular fracture of the distal phalanx of the 5th finger of the left hand. 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 ROS:  Denies fever, chills and recent illness. Denies CP, SOB and calf pain. Denies issues with bowel or bladder. Patient Active Problem List   Diagnosis    Failure to thrive in adult    Left hip pain    Renal cyst    Fracture of phalanx of left little finger       Physical Exam    There were no vitals taken for this visit. O: patient sitting in wheel chair in no acute distress. Patient is nonverbal and not able to participate with interpreters. left Upper Extremity Exam:  Skin intact . No erythema/induration/fluctuance present. no swelling present. no ecchymosis present. tenderness to palpation over the left pinky finger DIP joint. Palpable distal pulses, cap refill brisk in all digits. Demonstrates active range of motion of all digits. Difficult to fully assess the patients motor or sensory function due to his nonverbal status, he does make a fist however. Compartments supple throughout arm and forearm. boutonniere deformity of the left pinky finger. Xrays Reviewed  Demonstrates intra articular fracture of the pinky distal phalanx.  No change in fracture alignment compared to previous films    A:     ICD-10-CM    1. Closed nondisplaced fracture of distal phalanx of left little finger with routine healing, subsequent encounter  S62.667D XR HAND LEFT (MIN 3 VIEWS)   2. Closed displaced fracture of distal phalanx of left little finger, initial encounter  S62.448U        P:   Weight bearing as tolerated left hand, minimize use of 5th finger  Patient is unable to participate in exam or visit.  and  both attempted with no success. Will repeat xrays in 4 weeks at the [facility] facility to assess fracture healing. We will review these images and call to schedule an appointment with the patient if deemed necessary once images reviewed    NOTE: This report was transcribed using voice recognition software. Every effort was made to ensure accuracy; however, inadvertent computerized transcription errors may be present    Orthopaedic Attending    I have seen and evaluated the patient with the resident and agree with the above assessments on today's visit. I have performed the key components of the history and physical examination and concur completely with the findings and plans as documented above.     Electronically signed by   Tayla Tubbs DO  6/11/2021

## 2021-06-29 ENCOUNTER — TELEPHONE (OUTPATIENT)
Dept: ORTHOPEDIC SURGERY | Age: 51
End: 2021-06-29

## 2021-06-29 NOTE — TELEPHONE ENCOUNTER
Jade Mahan from [facility] called office inquiring about xray orders she has for patient's left hand. Per office note on 06/11/21, \"Will repeat xrays in 4 weeks at the patients facility to assess fracture healing. We will review these images and call to schedule an appointment with the patient if deemed necessary once images reviewed\". Discussed with Jade Mahan. Agreeable to plan. Facility uses [address] Xrays to be completed on 07/09/21. Provided nubia with fax number and address to have XR disc sent. Encouraged to call with questions or concerns.

## 2021-06-29 NOTE — LETTER
[address]  [address]  [place] [address]  Phone: [phone]  Fax: [phone]    [id number] [address]  Fax. [phone]      July 12, 2021     Patient: Tsering Duron   YOB: 1970   Date of Visit: 07/09/2021       To Whom It May Concern: It is my medical opinion that Tsering Duron can be weightbearing as tolerated to left upper extremity. If you have any questions or concerns, please don't hesitate to call.     Sincerely,        Mina Abernathy PA-C

## 2021-07-09 NOTE — TELEPHONE ENCOUNTER
From what can be seen on mobile x-rays, his distal phalanx fracture of the 5th finger of L hand does not appear changed as far as alignment, with mild healing. If he is complaining of pain to that finger, we can see him again in the office for another evaluation. If having no pain, can probably see as needed, as he is almost 7 weeks from DOI.

## 2021-07-09 NOTE — TELEPHONE ENCOUNTER
All returned to [facility]. [facility]. Patient denies pain. Advised nubia to follow up PRN. Agreeable to plan. Encouraged to call with questions or concerns. No future appointments.

## 2021-07-12 NOTE — TELEPHONE ENCOUNTER
Darinel Blandon at [facility], called office inquiring if patient can be WBAT to LUE, as he is now following up PRN. Discussed with Gerhard Leo PA-C. OK to be WBAT. Letter drafted to be faxed to [phone] at this time.

## 2022-05-18 ENCOUNTER — APPOINTMENT (OUTPATIENT)
Dept: GENERAL RADIOLOGY | Age: 52
DRG: 710 | End: 2022-05-18
Payer: MEDICAID

## 2022-05-18 ENCOUNTER — HOSPITAL ENCOUNTER (INPATIENT)
Age: 52
LOS: 4 days | Discharge: NURSING, REHAB OR LONG-TERM CARE | DRG: 710 | End: 2022-05-23
Attending: EMERGENCY MEDICINE | Admitting: INTERNAL MEDICINE
Payer: MEDICAID

## 2022-05-18 ENCOUNTER — APPOINTMENT (OUTPATIENT)
Dept: CT IMAGING | Age: 52
DRG: 710 | End: 2022-05-18
Payer: MEDICAID
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
## 2022-05-18 DIAGNOSIS — R50.9 FEVER, UNSPECIFIED FEVER CAUSE: ICD-10-CM

## 2022-05-18 DIAGNOSIS — S72.001A CLOSED FRACTURE OF NECK OF RIGHT FEMUR, INITIAL ENCOUNTER (HCC): Primary | ICD-10-CM

## 2022-05-18 LAB
ALBUMIN SERPL-MCNC: 3.7 G/DL (ref 3.5–5.2)
ALP BLD-CCNC: 113 U/L (ref 40–129)
ALT SERPL-CCNC: 19 U/L (ref 0–40)
ANION GAP SERPL CALCULATED.3IONS-SCNC: 11 MMOL/L (ref 7–16)
AST SERPL-CCNC: 14 U/L (ref 0–39)
BASOPHILS ABSOLUTE: 0.03 E9/L (ref 0–0.2)
BASOPHILS RELATIVE PERCENT: 0.3 % (ref 0–2)
BILIRUB SERPL-MCNC: 0.4 MG/DL (ref 0–1.2)
BUN BLDV-MCNC: 16 MG/DL (ref 6–20)
CALCIUM SERPL-MCNC: 8.4 MG/DL (ref 8.6–10.2)
CHLORIDE BLD-SCNC: 103 MMOL/L (ref 98–107)
CO2: 22 MMOL/L (ref 22–29)
CREAT SERPL-MCNC: 1 MG/DL (ref 0.7–1.2)
EOSINOPHILS ABSOLUTE: 0.24 E9/L (ref 0.05–0.5)
EOSINOPHILS RELATIVE PERCENT: 2 % (ref 0–6)
GFR AFRICAN AMERICAN: >60
GFR NON-AFRICAN AMERICAN: >60 ML/MIN/1.73
GLUCOSE BLD-MCNC: 284 MG/DL (ref 74–99)
HCT VFR BLD CALC: 40.9 % (ref 37–54)
HEMOGLOBIN: 13.8 G/DL (ref 12.5–16.5)
IMMATURE GRANULOCYTES #: 0.07 E9/L
IMMATURE GRANULOCYTES %: 0.6 % (ref 0–5)
LACTIC ACID, SEPSIS: 1.6 MMOL/L (ref 0.5–1.9)
LYMPHOCYTES ABSOLUTE: 1.98 E9/L (ref 1.5–4)
LYMPHOCYTES RELATIVE PERCENT: 16.8 % (ref 20–42)
MCH RBC QN AUTO: 27.2 PG (ref 26–35)
MCHC RBC AUTO-ENTMCNC: 33.7 % (ref 32–34.5)
MCV RBC AUTO: 80.7 FL (ref 80–99.9)
MONOCYTES ABSOLUTE: 0.9 E9/L (ref 0.1–0.95)
MONOCYTES RELATIVE PERCENT: 7.7 % (ref 2–12)
NEUTROPHILS ABSOLUTE: 8.54 E9/L (ref 1.8–7.3)
NEUTROPHILS RELATIVE PERCENT: 72.6 % (ref 43–80)
PDW BLD-RTO: 12.8 FL (ref 11.5–15)
PLATELET # BLD: 204 E9/L (ref 130–450)
PMV BLD AUTO: 10 FL (ref 7–12)
POTASSIUM REFLEX MAGNESIUM: 4 MMOL/L (ref 3.5–5)
RBC # BLD: 5.07 E12/L (ref 3.8–5.8)
SARS-COV-2, NAAT: NOT DETECTED
SODIUM BLD-SCNC: 136 MMOL/L (ref 132–146)
TOTAL PROTEIN: 7 G/DL (ref 6.4–8.3)
WBC # BLD: 11.8 E9/L (ref 4.5–11.5)

## 2022-05-18 PROCEDURE — 71045 X-RAY EXAM CHEST 1 VIEW: CPT

## 2022-05-18 PROCEDURE — 6360000004 HC RX CONTRAST MEDICATION: Performed by: RADIOLOGY

## 2022-05-18 PROCEDURE — 81001 URINALYSIS AUTO W/SCOPE: CPT

## 2022-05-18 PROCEDURE — 99285 EMERGENCY DEPT VISIT HI MDM: CPT

## 2022-05-18 PROCEDURE — 6370000000 HC RX 637 (ALT 250 FOR IP): Performed by: GENERAL PRACTICE

## 2022-05-18 PROCEDURE — 80053 COMPREHEN METABOLIC PANEL: CPT

## 2022-05-18 PROCEDURE — 87635 SARS-COV-2 COVID-19 AMP PRB: CPT

## 2022-05-18 PROCEDURE — 85025 COMPLETE CBC W/AUTO DIFF WBC: CPT

## 2022-05-18 PROCEDURE — 2580000003 HC RX 258: Performed by: GENERAL PRACTICE

## 2022-05-18 PROCEDURE — 87040 BLOOD CULTURE FOR BACTERIA: CPT

## 2022-05-18 PROCEDURE — 74177 CT ABD & PELVIS W/CONTRAST: CPT

## 2022-05-18 PROCEDURE — 72125 CT NECK SPINE W/O DYE: CPT

## 2022-05-18 PROCEDURE — 70450 CT HEAD/BRAIN W/O DYE: CPT

## 2022-05-18 PROCEDURE — 83605 ASSAY OF LACTIC ACID: CPT

## 2022-05-18 RX ORDER — 0.9 % SODIUM CHLORIDE 0.9 %
30 INTRAVENOUS SOLUTION INTRAVENOUS ONCE
Status: COMPLETED | OUTPATIENT
Start: 2022-05-18 | End: 2022-05-19

## 2022-05-18 RX ORDER — ACETAMINOPHEN 500 MG
1000 TABLET ORAL ONCE
Status: COMPLETED | OUTPATIENT
Start: 2022-05-18 | End: 2022-05-18

## 2022-05-18 RX ADMIN — SODIUM CHLORIDE 2448 ML: 9 INJECTION, SOLUTION INTRAVENOUS at 19:32

## 2022-05-18 RX ADMIN — ACETAMINOPHEN 1000 MG: 500 TABLET ORAL at 19:35

## 2022-05-18 RX ADMIN — IOPAMIDOL 90 ML: 755 INJECTION, SOLUTION INTRAVENOUS at 22:37

## 2022-05-18 ASSESSMENT — PAIN - FUNCTIONAL ASSESSMENT: PAIN_FUNCTIONAL_ASSESSMENT: ADULT NONVERBAL PAIN SCALE (NPVS)

## 2022-05-18 NOTE — ED NOTES
Patient was send to ED by [facility]. Patient is non-verbal, but when asked about pain he gestures to his right leg.       Corinne Zazueta RN  05/18/22 1943

## 2022-05-18 NOTE — LETTER
[place] Department Medicaid  CERTIFICATION OF NECESSITY  FOR NON-EMERGENCY TRANSPORTATION   BY GROUND AMBULANCE      Individual Information   1. Name: Franki Carbajal 2. [place] Medicaid Billing Number:   3. Address: [address] [place] [address]                               Transport to [employer]   4. Provider Name: [facility]   5. [place] Provider Number: National Provider Identifier (NPI):     Certification  7. Criteria:  During transport, this individual requires:  [x] Medical treatment or continuous     supervision by an EMT. [x] The administration or regulation of oxygen by another person. [] Supervised protective restraint. 8. Period Beginning Date: 05/19/2022   9. Length  [x] Not more than 5 day(s)  [] One Year     Additional Information Relevant to Certification   10. Comments or Explanations, If Necessary or Appropriate     COVID 19 positive, ORIF right femur, gait instability, fall risk, aphasia from previous CVA, oxygen @ _______ L nasal cannula     Certifying Practitioner Information   11. Name of Practitioner: Anna Fofana DO   12. [place] Medicaid Provider Number, If Applicable: [address] Provider Identifier (NPI):     Signature Information   14. Date of Signature: 05/19/2022 15. Name of Person Signing: SAVANNAH Sheppard, RN CM/Discharge Planner   16. Signature and Professional Designation: Electronically signed by Dirk Coronado RN on 5/19/2022 at 1:08 PM      OD [id number]  Rev. 7/2015    [facility] Encounter Date/Time: 5/18/2022 [address] [place] [address] Account: [de-identified]    MRN: [id number]    Patient: Franki Carbajal    Contact Serial #: [id number]      ENCOUNTER          Patient Class: I Private Enc? [id number]   Hospital Service:  INM   Encounter DX: Closed displaced fractur*   ADM Provider:     Procedure:     ATT Provider: Anna Fofana DO   REF Provider:    Admission DX: Closed displaced fracture of right femoral neck ([facility] [address] and DX codes: S72.001A      PATIENT                 Name: Juanita Langley : 1970 (45 yrs)   Address: [address] [place] [address] Sex: Male   [facility] [address]         Marital Status: Single   Employer: UNEMPLOYED         Orthodoxy: None   Primary Care Provider: Sunshine Gomes MD         Primary Phone: [phone]   EMERGENCY CONTACT   Contact Name Legal Guardian? Relationship to Patient Home Phone Work Phone   1. Aixa Wheeler  2. No,Per Pt      Other  Other [phone]                 GUARANTOR            Guarantor: Juanita Langley     : 1970   Address: [address] [place] [address] Sex: Male   [place] [id number]     Relation to Patient: Self       Home Phone: [phone]   Guarantor ID: [id number]       Work Phone:     Guarantor Employer: UNEMPLOYED         Status: NOT EMPLO*      COVERAGE        PRIMARY INSURANCE   Payor: Madhu Medel Plan: [facility] MEDICAID   Payor Address: [facility] DEPARTMENT; [address] [facility],  [address] [place] [address], [address] [place] [address]       Group Number: [id number] Insurance Type: INDEMNITY   Subscriber Name: Audie Bolus : 1970   Subscriber ID: [id number] Pat. Rel. to Sub: Self   SECONDARY INSURANCE   Payor:   Plan:     Payor Address:  ,           Group Number:   Insurance Type:     Subscriber Name:   Subscriber :     Subscriber ID:   Pat.  Rel. to Sub:             CSN: [id number]

## 2022-05-18 NOTE — ED PROVIDER NOTES
ED  Provider Note  Admit Date/RoomTime: 5/18/2022  6:51 PM  ED Room: 22/22     HPI:   Susan Lebron is a 45 y.o. male presenting to the ED for fall, beginning yesterday ago. History comes primarily from the patient. Patient Active Problem List:     Failure to thrive in adult     Left hip pain     Renal cyst     Fracture of phalanx of left little finger  . The complaint has been persistent, moderate in severity, improved by nothing and worsened by changing position. Associated symptoms include fever. Craig Mendes lives at a skilled nursing facility secondary to disability from a stroke which left him aphasic and contracted in his right upper extremity. He has just come out of 2 weeks on the COVID unit at his facility secondary to a positive COVID diagnosis. Yesterday he had an unwitnessed fall where he was found by the side of his bed. He initially had no symptoms or pain, however today he was declined from his baseline level of activity (which includes transferring from bed to chair) and any attempt at movement resulted in significant pain. He locates the pain to his right hip. For this reason he was brought to [facility] emergency department for further evaluation and treatment was found to be febrile at 102.8 on arrival.             Review of Systems   Unable to perform ROS: Patient nonverbal        Physical Exam  Vitals and nursing note reviewed. Constitutional:       General: He is not in acute distress. Appearance: He is well-developed. He is ill-appearing. He is not diaphoretic. Comments: Febrile on exam   HENT:      Head: Normocephalic and atraumatic. Mouth/Throat:      Dentition: Abnormal dentition. Eyes:      Pupils: Pupils are equal, round, and reactive to light. Neck:      Vascular: No JVD. Trachea: No tracheal deviation. Cardiovascular:      Rate and Rhythm: Regular rhythm. Heart sounds: No murmur heard. No friction rub.  No gallop. Pulmonary:      Effort: Pulmonary effort is normal. No respiratory distress. Breath sounds: Normal breath sounds. No stridor. No wheezing or rales. Chest:      Chest wall: No tenderness. Abdominal:      General: Bowel sounds are normal. There is no distension. Palpations: Abdomen is soft. Tenderness: There is no abdominal tenderness. There is no guarding. Musculoskeletal:         General: Normal range of motion. Cervical back: Normal range of motion. Comments: Tenderness to palpation noted over the right hip   Skin:     General: Skin is warm and dry. Neurological:      Mental Status: He is alert. Cranial Nerves: No cranial nerve deficit. Comments: Chronically contracted in the right upper extremity   Psychiatric:         Behavior: Behavior normal.          Procedures     MDM  Number of Diagnoses or Management Options  Closed fracture of neck of right femur, initial encounter [facility]  Fall, initial encounter  Fever, unspecified fever cause  Diagnosis management comments: Emergency department evaluation was notable for findings of right femoral neck fracture in a patient with prior CVA who is no longer able to perform his activities of daily living secondary to this injury. Patient also has febrile during his emergency department stay and would benefit from close observation in the inpatient setting as well as orthopedic evaluation. This information was relayed to the patient who understood this plan of care and was amenable to the plan.   Patient was discussed with the admitting service (Dr. Franki Lala) who concurred with the decision for admission, and have agreed to admit the patient to med/surg       Amount and/or Complexity of Data Reviewed  Clinical lab tests: reviewed  Tests in the radiology section of CPT®: reviewed  Decide to obtain previous medical records or to obtain history from someone other than the patient: yes       ED Course as of 05/19/22 1647   Thu May 19, 2022   0026 Patient signed out to me by Dr. Teddy Zimmer. CT abdomen pelvis suspicious for right femoral neck fracture and patient will be sent for repeat x-ray to confirm. Orthopedic surgery has been consulted. Patient has noted pain to the left side of his jaw and will be sent for CT soft tissue neck. Patient will be admitted for right femoral neck fracture and sepsis of unknown etiology. [JS]      ED Course User Index  [JS] Hasmukh Eaton, DO       --------------------------------------------- PAST HISTORY ---------------------------------------------  Past Medical History:  has a past medical history of Cerebral artery occlusion with cerebral infarction ([facility] [address] Diabetes mellitus ([facility] [address] [facility] Hypertension, and [facility] Past Surgical History:  has no past surgical history on file. Social History:  reports that he has never smoked. He has never used smokeless tobacco. He reports previous drug use. He reports that he does not drink alcohol. Family History: family history is not on file. The patients home medications have been reviewed. Allergies: Patient has no known allergies.     -------------------------------------------------- RESULTS -------------------------------------------------    LABS:  Results for orders placed or performed during the hospital encounter of 05/18/22   COVID-19, Rapid    Specimen: Nasopharyngeal Swab   Result Value Ref Range    SARS-CoV-2, NAAT Not Detected Not Detected   Respiratory Panel, Molecular, with COVID-19 (Restricted: peds pts or suitable admitted adults)    Specimen: Nasopharyngeal   Result Value Ref Range    Adenovirus by PCR Not Detected Not Detected    Bordetella parapertussis by PCR Not Detected Not Detected    Bordetella pertussis by PCR Not Detected Not Detected    Chlamydophilia pneumoniae by PCR Not Detected Not Detected    Coronavirus 229E by PCR Not Detected Not Detected    Coronavirus HKU1 by PCR Not Detected Not Detected    Coronavirus NL63 by PCR Not Detected Not Detected    Coronavirus OC43 by PCR Not Detected Not Detected    SARS-CoV-2, PCR DETECTED (A) Not Detected    Human Metapneumovirus by PCR Not Detected Not Detected    Human Rhinovirus/Enterovirus by PCR Not Detected Not Detected    Influenza A by PCR Not Detected Not Detected    Influenza B by PCR Not Detected Not Detected    Mycoplasma pneumoniae by PCR Not Detected Not Detected    Parainfluenza Virus 1 by PCR Not Detected Not Detected    Parainfluenza Virus 2 by PCR Not Detected Not Detected    Parainfluenza Virus 3 by PCR Not Detected Not Detected    Parainfluenza Virus 4 by PCR Not Detected Not Detected    Respiratory Syncytial Virus by PCR Not Detected Not Detected   Lactate, Sepsis   Result Value Ref Range    Lactic Acid, Sepsis 1.6 0.5 - 1.9 mmol/L   Lactate, Sepsis   Result Value Ref Range    Lactic Acid, Sepsis 1.5 0.5 - 1.9 mmol/L   Urinalysis with Microscopic   Result Value Ref Range    Color, UA Yellow Straw/Yellow    Clarity, UA Clear Clear    Glucose, Ur 250 (A) Negative mg/dL    Bilirubin Urine Negative Negative    Ketones, Urine Negative Negative mg/dL    Specific Gravity, UA <=1.005 1.005 - 1.030    Blood, Urine Negative Negative    pH, UA 7.0 5.0 - 9.0    Protein, UA Negative Negative mg/dL    Urobilinogen, Urine 0.2 <2.0 E.U./dL    Nitrite, Urine Negative Negative    Leukocyte Esterase, Urine Negative Negative    WBC, UA NONE 0 - 5 /HPF    RBC, UA NONE 0 - 2 /HPF    Bacteria, UA NONE SEEN None Seen /HPF   CBC with Auto Differential   Result Value Ref Range    WBC 11.8 (H) 4.5 - 11.5 E9/L    RBC 5.07 3.80 - 5.80 E12/L    Hemoglobin 13.8 12.5 - 16.5 g/dL    Hematocrit 40.9 37.0 - 54.0 %    MCV 80.7 80.0 - 99.9 fL    MCH 27.2 26.0 - 35.0 pg    MCHC 33.7 32.0 - 34.5 %    RDW 12.8 11.5 - 15.0 fL    Platelets 247 187 - 212 E9/L    MPV 10.0 7.0 - 12.0 fL    Neutrophils % 72.6 43.0 - 80.0 %    Immature Granulocytes % 0.6 0.0 - 5.0 %    Lymphocytes % 16.8 (L) 20.0 - 42.0 % Monocytes % 7.7 2.0 - 12.0 %    Eosinophils % 2.0 0.0 - 6.0 %    Basophils % 0.3 0.0 - 2.0 %    Neutrophils Absolute 8.54 (H) 1.80 - 7.30 E9/L    Immature Granulocytes # 0.07 E9/L    Lymphocytes Absolute 1.98 1.50 - 4.00 E9/L    Monocytes Absolute 0.90 0.10 - 0.95 E9/L    Eosinophils Absolute 0.24 0.05 - 0.50 E9/L    Basophils Absolute 0.03 0.00 - 0.20 E9/L   Comprehensive Metabolic Panel w/ Reflex to MG   Result Value Ref Range    Sodium 136 132 - 146 mmol/L    Potassium reflex Magnesium 4.0 3.5 - 5.0 mmol/L    Chloride 103 98 - 107 mmol/L    CO2 22 22 - 29 mmol/L    Anion Gap 11 7 - 16 mmol/L    Glucose 284 (H) 74 - 99 mg/dL    BUN 16 6 - 20 mg/dL    CREATININE 1.0 0.7 - 1.2 mg/dL    GFR Non-African American >60 >=60 mL/min/1.73    GFR African American >60     Calcium 8.4 (L) 8.6 - 10.2 mg/dL    Total Protein 7.0 6.4 - 8.3 g/dL    Albumin 3.7 3.5 - 5.2 g/dL    Total Bilirubin 0.4 0.0 - 1.2 mg/dL    Alkaline Phosphatase 113 40 - 129 U/L    ALT 19 0 - 40 U/L    AST 14 0 - 39 U/L   Protime-INR   Result Value Ref Range    Protime 15.9 (H) 9.3 - 12.4 sec    INR 1.4    Comprehensive Metabolic Panel   Result Value Ref Range    Sodium 133 132 - 146 mmol/L    Potassium 4.2 3.5 - 5.0 mmol/L    Chloride 102 98 - 107 mmol/L    CO2 21 (L) 22 - 29 mmol/L    Anion Gap 10 7 - 16 mmol/L    Glucose 186 (H) 74 - 99 mg/dL    BUN 12 6 - 20 mg/dL    CREATININE 0.9 0.7 - 1.2 mg/dL    GFR Non-African American >60 >=60 mL/min/1.73    GFR African American >60     Calcium 7.6 (L) 8.6 - 10.2 mg/dL    Total Protein 6.4 6.4 - 8.3 g/dL    Albumin 3.4 (L) 3.5 - 5.2 g/dL    Total Bilirubin 0.5 0.0 - 1.2 mg/dL    Alkaline Phosphatase 95 40 - 129 U/L    ALT 15 0 - 40 U/L    AST 11 0 - 39 U/L   CBC   Result Value Ref Range    WBC 9.7 4.5 - 11.5 E9/L    RBC 5.03 3.80 - 5.80 E12/L    Hemoglobin 13.5 12.5 - 16.5 g/dL    Hematocrit 40.7 37.0 - 54.0 %    MCV 80.9 80.0 - 99.9 fL    MCH 26.8 26.0 - 35.0 pg    MCHC 33.2 32.0 - 34.5 %    RDW 12.8 11.5 - 15.0 fL    Platelets 340 738 - 757 E9/L    MPV 9.7 7.0 - 12.0 fL   Basic Metabolic Panel w/ Reflex to MG   Result Value Ref Range    Sodium 138 132 - 146 mmol/L    Potassium reflex Magnesium 4.0 3.5 - 5.0 mmol/L    Chloride 105 98 - 107 mmol/L    CO2 21 (L) 22 - 29 mmol/L    Anion Gap 12 7 - 16 mmol/L    Glucose 210 (H) 74 - 99 mg/dL    BUN 13 6 - 20 mg/dL    CREATININE 1.1 0.7 - 1.2 mg/dL    GFR Non-African American >60 >=60 mL/min/1.73    GFR African American >60     Calcium 8.4 (L) 8.6 - 10.2 mg/dL   CBC with Auto Differential   Result Value Ref Range    WBC 10.6 4.5 - 11.5 E9/L    RBC 5.25 3.80 - 5.80 E12/L    Hemoglobin 14.1 12.5 - 16.5 g/dL    Hematocrit 42.7 37.0 - 54.0 %    MCV 81.3 80.0 - 99.9 fL    MCH 26.9 26.0 - 35.0 pg    MCHC 33.0 32.0 - 34.5 %    RDW 12.9 11.5 - 15.0 fL    Platelets 076 355 - 111 E9/L    MPV 9.9 7.0 - 12.0 fL    Neutrophils % 73.3 43.0 - 80.0 %    Immature Granulocytes % 0.5 0.0 - 5.0 %    Lymphocytes % 14.8 (L) 20.0 - 42.0 %    Monocytes % 8.2 2.0 - 12.0 %    Eosinophils % 2.9 0.0 - 6.0 %    Basophils % 0.3 0.0 - 2.0 %    Neutrophils Absolute 7.75 (H) 1.80 - 7.30 E9/L    Immature Granulocytes # 0.05 E9/L    Lymphocytes Absolute 1.56 1.50 - 4.00 E9/L    Monocytes Absolute 0.87 0.10 - 0.95 E9/L    Eosinophils Absolute 0.31 0.05 - 0.50 E9/L    Basophils Absolute 0.03 0.00 - 0.20 E9/L   Hemoglobin A1c   Result Value Ref Range    Hemoglobin A1C 8.9 (H) 4.0 - 5.6 %   Procalcitonin   Result Value Ref Range    Procalcitonin 0.96 (H) 0.00 - 0.08 ng/mL   POCT Glucose   Result Value Ref Range    Meter Glucose 221 (H) 74 - 99 mg/dL   POCT Glucose   Result Value Ref Range    Meter Glucose 184 (H) 74 - 99 mg/dL   TYPE AND SCREEN   Result Value Ref Range    ABO/Rh O POS     Antibody Screen NEG        RADIOLOGY:  XR HIP 2-3 VW W PELVIS RIGHT   Final Result   No acute abnormality of the hip. XR FEMUR RIGHT (MIN 2 VIEWS)   Final Result   No acute osseous abnormality.   MRI would be the next best modality of choice   to evaluate for fracture if clinically warranted. CT SOFT TISSUE NECK W CONTRAST   Preliminary Result   Stranding changes within the posterior subcutaneous tissues of the neck in   the midline likely representing old inflammation/infection. No significant left-sided neck swelling or inflammation. No abscess   collection. Large old left MCA infarct. CT Head WO Contrast   Final Result   No acute intracranial abnormality. 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 Chronic infarct within the left cerebellum. CT Cervical Spine WO Contrast   Final Result   No acute abnormality of the cervical spine. CT ABDOMEN PELVIS W IV CONTRAST Additional Contrast? None   Final Result   High suspicion for right femoral neck fracture. XR CHEST PORTABLE   Final Result   No pneumonia or pleural effusion.             ------------------------- NURSING NOTES AND VITALS REVIEWED ---------------------------  Date / Time Roomed:  5/18/2022  6:51 PM  ED Bed Assignment:  [id number]    The nursing notes within the ED encounter and vital signs as below have been reviewed.      Patient Vitals for the past 24 hrs:   BP Temp Temp src Pulse Resp SpO2 Height Weight   05/19/22 1215 134/82 97.3 °F (36.3 °C) Temporal 84 18 93 % -- --   05/19/22 0530 106/78 97 °F (36.1 °C) Temporal 89 22 93 % -- --   05/19/22 0509 -- -- -- -- -- 94 % -- --   05/19/22 0345 -- -- -- -- -- (!) 83 % -- --   05/19/22 0231 125/85 -- -- 86 18 (!) 4 % -- --   05/19/22 0042 -- 98 °F (36.7 °C) Oral -- -- -- -- --   05/19/22 0000 134/79 -- -- -- -- 92 % -- --   05/18/22 2230 134/79 -- -- 79 22 92 % -- --   05/18/22 1915 (!) 142/88 102.8 °F (39.3 °C) Oral 103 18 93 % -- --   05/18/22 1854 (!) 135/94 97.8 °F (36.6 °C) -- 104 18 93 % 5' 10\" (1.778 m) 180 lb (81.6 kg)       Oxygen Saturation Interpretation: Normal    ------------------------------------------ PROGRESS NOTES ------------------------------------------  Re-evaluation(s):  Patients symptoms show no change  Repeat physical examination is not changed    Counseling:  I have spoken with the patient and discussed todays results, in addition to providing specific details for the plan of care and counseling regarding the diagnosis and prognosis. Their questions are answered at this time and they are agreeable with the plan of admission.    --------------------------------- ADDITIONAL PROVIDER NOTES ---------------------------------  Consultations:   Spoke with Dr. Liam Schmidt. Discussed case. They will admit the patient. This patient's ED course included: a personal history and physicial examination, re-evaluation prior to disposition, multiple bedside re-evaluations, cardiac monitoring and continuous pulse oximetry    This patient has remained hemodynamically stable during their ED course. Diagnosis:  1. [facility] right [facility]    2. Fall, initial encounter    3. Fever, unspecified fever cause        Disposition:  Patient's disposition: Admit to telemetry  Patient's condition is fair.          [address] DO  Resident  05/19/22 7370

## 2022-05-19 ENCOUNTER — APPOINTMENT (OUTPATIENT)
Dept: CT IMAGING | Age: 52
DRG: 710 | End: 2022-05-19
Payer: MEDICAID

## 2022-05-19 ENCOUNTER — APPOINTMENT (OUTPATIENT)
Dept: GENERAL RADIOLOGY | Age: 52
DRG: 710 | End: 2022-05-19
Payer: MEDICAID

## 2022-05-19 ENCOUNTER — ANESTHESIA EVENT (OUTPATIENT)
Dept: OPERATING ROOM | Age: 52
DRG: 710 | End: 2022-05-19
Payer: MEDICAID

## 2022-05-19 ENCOUNTER — ANESTHESIA (OUTPATIENT)
Dept: OPERATING ROOM | Age: 52
DRG: 710 | End: 2022-05-19
Payer: MEDICAID
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
## 2022-05-19 PROBLEM — A41.9 SEPSIS (HCC): Status: ACTIVE | Noted: 2022-05-19

## 2022-05-19 PROBLEM — S72.001A CLOSED DISPLACED FRACTURE OF RIGHT FEMORAL NECK (HCC): Status: ACTIVE | Noted: 2022-05-19

## 2022-05-19 PROBLEM — I10 PRIMARY HYPERTENSION: Status: ACTIVE | Noted: 2022-05-19

## 2022-05-19 PROBLEM — E11.9 TYPE 2 DIABETES MELLITUS (HCC): Status: ACTIVE | Noted: 2022-05-19

## 2022-05-19 PROBLEM — Z86.73 HISTORY OF CVA (CEREBROVASCULAR ACCIDENT): Status: ACTIVE | Noted: 2022-05-19

## 2022-05-19 PROBLEM — Z87.898 HISTORY OF SEIZURES: Status: ACTIVE | Noted: 2022-05-19

## 2022-05-19 LAB
ABO/RH: NORMAL
ADENOVIRUS BY PCR: NOT DETECTED
ALBUMIN SERPL-MCNC: 3.4 G/DL (ref 3.5–5.2)
ALP BLD-CCNC: 95 U/L (ref 40–129)
ALT SERPL-CCNC: 15 U/L (ref 0–40)
ANION GAP SERPL CALCULATED.3IONS-SCNC: 10 MMOL/L (ref 7–16)
ANION GAP SERPL CALCULATED.3IONS-SCNC: 12 MMOL/L (ref 7–16)
ANTIBODY SCREEN: NORMAL
AST SERPL-CCNC: 11 U/L (ref 0–39)
BACTERIA: ABNORMAL /HPF
BASOPHILS ABSOLUTE: 0.03 E9/L (ref 0–0.2)
BASOPHILS RELATIVE PERCENT: 0.3 % (ref 0–2)
BILIRUB SERPL-MCNC: 0.5 MG/DL (ref 0–1.2)
BILIRUBIN URINE: NEGATIVE
BLOOD, URINE: NEGATIVE
BORDETELLA PARAPERTUSSIS BY PCR: NOT DETECTED
BORDETELLA PERTUSSIS BY PCR: NOT DETECTED
BUN BLDV-MCNC: 12 MG/DL (ref 6–20)
BUN BLDV-MCNC: 13 MG/DL (ref 6–20)
CALCIUM SERPL-MCNC: 7.6 MG/DL (ref 8.6–10.2)
CALCIUM SERPL-MCNC: 8.4 MG/DL (ref 8.6–10.2)
CHLAMYDOPHILIA PNEUMONIAE BY PCR: NOT DETECTED
CHLORIDE BLD-SCNC: 102 MMOL/L (ref 98–107)
CHLORIDE BLD-SCNC: 105 MMOL/L (ref 98–107)
CLARITY: CLEAR
CO2: 21 MMOL/L (ref 22–29)
CO2: 21 MMOL/L (ref 22–29)
COLOR: YELLOW
CORONAVIRUS 229E BY PCR: NOT DETECTED
CORONAVIRUS HKU1 BY PCR: NOT DETECTED
CORONAVIRUS NL63 BY PCR: NOT DETECTED
CORONAVIRUS OC43 BY PCR: NOT DETECTED
CREAT SERPL-MCNC: 0.9 MG/DL (ref 0.7–1.2)
CREAT SERPL-MCNC: 1.1 MG/DL (ref 0.7–1.2)
EOSINOPHILS ABSOLUTE: 0.31 E9/L (ref 0.05–0.5)
EOSINOPHILS RELATIVE PERCENT: 2.9 % (ref 0–6)
GFR AFRICAN AMERICAN: >60
GFR AFRICAN AMERICAN: >60
GFR NON-AFRICAN AMERICAN: >60 ML/MIN/1.73
GFR NON-AFRICAN AMERICAN: >60 ML/MIN/1.73
GLUCOSE BLD-MCNC: 186 MG/DL (ref 74–99)
GLUCOSE BLD-MCNC: 210 MG/DL (ref 74–99)
GLUCOSE URINE: 250 MG/DL
HBA1C MFR BLD: 8.9 % (ref 4–5.6)
HCT VFR BLD CALC: 40.7 % (ref 37–54)
HCT VFR BLD CALC: 42.7 % (ref 37–54)
HEMOGLOBIN: 13.5 G/DL (ref 12.5–16.5)
HEMOGLOBIN: 14.1 G/DL (ref 12.5–16.5)
HUMAN METAPNEUMOVIRUS BY PCR: NOT DETECTED
HUMAN RHINOVIRUS/ENTEROVIRUS BY PCR: NOT DETECTED
IMMATURE GRANULOCYTES #: 0.05 E9/L
IMMATURE GRANULOCYTES %: 0.5 % (ref 0–5)
INFLUENZA A BY PCR: NOT DETECTED
INFLUENZA B BY PCR: NOT DETECTED
INR BLD: 1.4
KETONES, URINE: NEGATIVE MG/DL
LACTIC ACID, SEPSIS: 1.5 MMOL/L (ref 0.5–1.9)
LEUKOCYTE ESTERASE, URINE: NEGATIVE
LYMPHOCYTES ABSOLUTE: 1.56 E9/L (ref 1.5–4)
LYMPHOCYTES RELATIVE PERCENT: 14.8 % (ref 20–42)
MCH RBC QN AUTO: 26.8 PG (ref 26–35)
MCH RBC QN AUTO: 26.9 PG (ref 26–35)
MCHC RBC AUTO-ENTMCNC: 33 % (ref 32–34.5)
MCHC RBC AUTO-ENTMCNC: 33.2 % (ref 32–34.5)
MCV RBC AUTO: 80.9 FL (ref 80–99.9)
MCV RBC AUTO: 81.3 FL (ref 80–99.9)
METER GLUCOSE: 160 MG/DL (ref 74–99)
METER GLUCOSE: 184 MG/DL (ref 74–99)
METER GLUCOSE: 221 MG/DL (ref 74–99)
METER GLUCOSE: 229 MG/DL (ref 74–99)
MONOCYTES ABSOLUTE: 0.87 E9/L (ref 0.1–0.95)
MONOCYTES RELATIVE PERCENT: 8.2 % (ref 2–12)
MYCOPLASMA PNEUMONIAE BY PCR: NOT DETECTED
NEUTROPHILS ABSOLUTE: 7.75 E9/L (ref 1.8–7.3)
NEUTROPHILS RELATIVE PERCENT: 73.3 % (ref 43–80)
NITRITE, URINE: NEGATIVE
PARAINFLUENZA VIRUS 1 BY PCR: NOT DETECTED
PARAINFLUENZA VIRUS 2 BY PCR: NOT DETECTED
PARAINFLUENZA VIRUS 3 BY PCR: NOT DETECTED
PARAINFLUENZA VIRUS 4 BY PCR: NOT DETECTED
PDW BLD-RTO: 12.8 FL (ref 11.5–15)
PDW BLD-RTO: 12.9 FL (ref 11.5–15)
PH UA: 7 (ref 5–9)
PLATELET # BLD: 181 E9/L (ref 130–450)
PLATELET # BLD: 196 E9/L (ref 130–450)
PMV BLD AUTO: 9.7 FL (ref 7–12)
PMV BLD AUTO: 9.9 FL (ref 7–12)
POTASSIUM REFLEX MAGNESIUM: 4 MMOL/L (ref 3.5–5)
POTASSIUM SERPL-SCNC: 4.2 MMOL/L (ref 3.5–5)
PROCALCITONIN: 0.96 NG/ML (ref 0–0.08)
PROTEIN UA: NEGATIVE MG/DL
PROTHROMBIN TIME: 15.9 SEC (ref 9.3–12.4)
RBC # BLD: 5.03 E12/L (ref 3.8–5.8)
RBC # BLD: 5.25 E12/L (ref 3.8–5.8)
RBC UA: ABNORMAL /HPF (ref 0–2)
RESPIRATORY SYNCYTIAL VIRUS BY PCR: NOT DETECTED
SARS-COV-2, PCR: DETECTED
SODIUM BLD-SCNC: 133 MMOL/L (ref 132–146)
SODIUM BLD-SCNC: 138 MMOL/L (ref 132–146)
SPECIFIC GRAVITY UA: <=1.005 (ref 1–1.03)
TOTAL PROTEIN: 6.4 G/DL (ref 6.4–8.3)
UROBILINOGEN, URINE: 0.2 E.U./DL
WBC # BLD: 10.6 E9/L (ref 4.5–11.5)
WBC # BLD: 9.7 E9/L (ref 4.5–11.5)
WBC UA: ABNORMAL /HPF (ref 0–5)

## 2022-05-19 PROCEDURE — 83036 HEMOGLOBIN GLYCOSYLATED A1C: CPT

## 2022-05-19 PROCEDURE — 80048 BASIC METABOLIC PNL TOTAL CA: CPT

## 2022-05-19 PROCEDURE — 6370000000 HC RX 637 (ALT 250 FOR IP): Performed by: NURSE PRACTITIONER

## 2022-05-19 PROCEDURE — 73502 X-RAY EXAM HIP UNI 2-3 VIEWS: CPT

## 2022-05-19 PROCEDURE — 2580000003 HC RX 258: Performed by: STUDENT IN AN ORGANIZED HEALTH CARE EDUCATION/TRAINING PROGRAM

## 2022-05-19 PROCEDURE — S5553 INSULIN LONG ACTING 5 U: HCPCS | Performed by: INTERNAL MEDICINE

## 2022-05-19 PROCEDURE — 85027 COMPLETE CBC AUTOMATED: CPT

## 2022-05-19 PROCEDURE — 86900 BLOOD TYPING SEROLOGIC ABO: CPT

## 2022-05-19 PROCEDURE — 2700000000 HC OXYGEN THERAPY PER DAY

## 2022-05-19 PROCEDURE — 6370000000 HC RX 637 (ALT 250 FOR IP)

## 2022-05-19 PROCEDURE — 36415 COLL VENOUS BLD VENIPUNCTURE: CPT

## 2022-05-19 PROCEDURE — 2580000003 HC RX 258: Performed by: NURSE PRACTITIONER

## 2022-05-19 PROCEDURE — 85610 PROTHROMBIN TIME: CPT

## 2022-05-19 PROCEDURE — 86901 BLOOD TYPING SEROLOGIC RH(D): CPT

## 2022-05-19 PROCEDURE — 6370000000 HC RX 637 (ALT 250 FOR IP): Performed by: INTERNAL MEDICINE

## 2022-05-19 PROCEDURE — 6360000002 HC RX W HCPCS: Performed by: STUDENT IN AN ORGANIZED HEALTH CARE EDUCATION/TRAINING PROGRAM

## 2022-05-19 PROCEDURE — 84145 PROCALCITONIN (PCT): CPT

## 2022-05-19 PROCEDURE — 6360000004 HC RX CONTRAST MEDICATION: Performed by: RADIOLOGY

## 2022-05-19 PROCEDURE — 86850 RBC ANTIBODY SCREEN: CPT

## 2022-05-19 PROCEDURE — 85025 COMPLETE CBC W/AUTO DIFF WBC: CPT

## 2022-05-19 PROCEDURE — 83605 ASSAY OF LACTIC ACID: CPT

## 2022-05-19 PROCEDURE — 6360000002 HC RX W HCPCS: Performed by: INTERNAL MEDICINE

## 2022-05-19 PROCEDURE — 2060000000 HC ICU INTERMEDIATE R&B

## 2022-05-19 PROCEDURE — 82962 GLUCOSE BLOOD TEST: CPT

## 2022-05-19 PROCEDURE — 80053 COMPREHEN METABOLIC PANEL: CPT

## 2022-05-19 PROCEDURE — 0202U NFCT DS 22 TRGT SARS-COV-2: CPT

## 2022-05-19 PROCEDURE — 73552 X-RAY EXAM OF FEMUR 2/>: CPT

## 2022-05-19 PROCEDURE — 70491 CT SOFT TISSUE NECK W/DYE: CPT

## 2022-05-19 PROCEDURE — 99222 1ST HOSP IP/OBS MODERATE 55: CPT | Performed by: INTERNAL MEDICINE

## 2022-05-19 RX ORDER — AMLODIPINE BESYLATE 10 MG/1
10 TABLET ORAL DAILY
Status: DISCONTINUED | OUTPATIENT
Start: 2022-05-19 | End: 2022-05-23 | Stop reason: HOSPADM

## 2022-05-19 RX ORDER — OXYCODONE HYDROCHLORIDE 5 MG/1
5 TABLET ORAL EVERY 4 HOURS PRN
Status: DISCONTINUED | OUTPATIENT
Start: 2022-05-19 | End: 2022-05-23 | Stop reason: HOSPADM

## 2022-05-19 RX ORDER — INSULIN GLARGINE-YFGN 100 [IU]/ML
20 INJECTION, SOLUTION SUBCUTANEOUS
Status: DISCONTINUED | OUTPATIENT
Start: 2022-05-19 | End: 2022-05-19

## 2022-05-19 RX ORDER — INSULIN GLARGINE-YFGN 100 [IU]/ML
12 INJECTION, SOLUTION SUBCUTANEOUS NIGHTLY
Status: DISCONTINUED | OUTPATIENT
Start: 2022-05-19 | End: 2022-05-19

## 2022-05-19 RX ORDER — DEXTROSE MONOHYDRATE 50 MG/ML
INJECTION, SOLUTION INTRAVENOUS CONTINUOUS
Status: DISCONTINUED | OUTPATIENT
Start: 2022-05-19 | End: 2022-05-19 | Stop reason: SDUPTHER

## 2022-05-19 RX ORDER — DOCUSATE SODIUM 100 MG/1
100 CAPSULE, LIQUID FILLED ORAL NIGHTLY
Status: DISCONTINUED | OUTPATIENT
Start: 2022-05-19 | End: 2022-05-23 | Stop reason: HOSPADM

## 2022-05-19 RX ORDER — DEXTROSE MONOHYDRATE 25 G/50ML
25 INJECTION, SOLUTION INTRAVENOUS PRN
Status: DISCONTINUED | OUTPATIENT
Start: 2022-05-19 | End: 2022-05-23 | Stop reason: HOSPADM

## 2022-05-19 RX ORDER — ASPIRIN 81 MG/1
81 TABLET ORAL DAILY
Status: DISCONTINUED | OUTPATIENT
Start: 2022-05-19 | End: 2022-05-20

## 2022-05-19 RX ORDER — SODIUM CHLORIDE 9 MG/ML
INJECTION, SOLUTION INTRAVENOUS CONTINUOUS
Status: DISCONTINUED | OUTPATIENT
Start: 2022-05-19 | End: 2022-05-19

## 2022-05-19 RX ORDER — OXYCODONE HYDROCHLORIDE 10 MG/1
10 TABLET ORAL EVERY 4 HOURS PRN
Status: DISCONTINUED | OUTPATIENT
Start: 2022-05-19 | End: 2022-05-23 | Stop reason: HOSPADM

## 2022-05-19 RX ORDER — ACETAMINOPHEN 650 MG/1
650 SUPPOSITORY RECTAL EVERY 6 HOURS PRN
Status: DISCONTINUED | OUTPATIENT
Start: 2022-05-19 | End: 2022-05-23 | Stop reason: HOSPADM

## 2022-05-19 RX ORDER — BISACODYL 10 MG
10 SUPPOSITORY, RECTAL RECTAL DAILY PRN
Status: DISCONTINUED | OUTPATIENT
Start: 2022-05-19 | End: 2022-05-23 | Stop reason: HOSPADM

## 2022-05-19 RX ORDER — ACETAMINOPHEN 325 MG/1
650 TABLET ORAL EVERY 6 HOURS PRN
Status: DISCONTINUED | OUTPATIENT
Start: 2022-05-19 | End: 2022-05-23 | Stop reason: HOSPADM

## 2022-05-19 RX ORDER — SODIUM CHLORIDE 0.9 % (FLUSH) 0.9 %
5-40 SYRINGE (ML) INJECTION EVERY 12 HOURS SCHEDULED
Status: DISCONTINUED | OUTPATIENT
Start: 2022-05-19 | End: 2022-05-23 | Stop reason: HOSPADM

## 2022-05-19 RX ORDER — ENOXAPARIN SODIUM 100 MG/ML
40 INJECTION SUBCUTANEOUS DAILY
Status: DISCONTINUED | OUTPATIENT
Start: 2022-05-19 | End: 2022-05-19

## 2022-05-19 RX ORDER — PANTOPRAZOLE SODIUM 40 MG/1
40 TABLET, DELAYED RELEASE ORAL
Status: DISCONTINUED | OUTPATIENT
Start: 2022-05-19 | End: 2022-05-23 | Stop reason: HOSPADM

## 2022-05-19 RX ORDER — DEXTROSE MONOHYDRATE 50 MG/ML
100 INJECTION, SOLUTION INTRAVENOUS PRN
Status: DISCONTINUED | OUTPATIENT
Start: 2022-05-19 | End: 2022-05-23 | Stop reason: HOSPADM

## 2022-05-19 RX ORDER — HEPARIN SODIUM 10000 [USP'U]/ML
5000 INJECTION, SOLUTION INTRAVENOUS; SUBCUTANEOUS EVERY 8 HOURS
Status: DISCONTINUED | OUTPATIENT
Start: 2022-05-19 | End: 2022-05-23 | Stop reason: HOSPADM

## 2022-05-19 RX ORDER — INSULIN GLARGINE-YFGN 100 [IU]/ML
12 INJECTION, SOLUTION SUBCUTANEOUS 2 TIMES DAILY
Status: DISCONTINUED | OUTPATIENT
Start: 2022-05-19 | End: 2022-05-23 | Stop reason: HOSPADM

## 2022-05-19 RX ORDER — LEVETIRACETAM 500 MG/1
500 TABLET ORAL 2 TIMES DAILY
Status: DISCONTINUED | OUTPATIENT
Start: 2022-05-19 | End: 2022-05-23 | Stop reason: HOSPADM

## 2022-05-19 RX ORDER — MORPHINE SULFATE 2 MG/ML
1 INJECTION, SOLUTION INTRAMUSCULAR; INTRAVENOUS EVERY 6 HOURS PRN
Status: DISCONTINUED | OUTPATIENT
Start: 2022-05-19 | End: 2022-05-23 | Stop reason: HOSPADM

## 2022-05-19 RX ORDER — CARVEDILOL 25 MG/1
25 TABLET ORAL 2 TIMES DAILY WITH MEALS
Status: DISCONTINUED | OUTPATIENT
Start: 2022-05-19 | End: 2022-05-23 | Stop reason: HOSPADM

## 2022-05-19 RX ORDER — SODIUM CHLORIDE 9 MG/ML
INJECTION, SOLUTION INTRAVENOUS PRN
Status: DISCONTINUED | OUTPATIENT
Start: 2022-05-19 | End: 2022-05-23 | Stop reason: HOSPADM

## 2022-05-19 RX ORDER — ATORVASTATIN CALCIUM 40 MG/1
40 TABLET, FILM COATED ORAL NIGHTLY
Status: DISCONTINUED | OUTPATIENT
Start: 2022-05-19 | End: 2022-05-23 | Stop reason: HOSPADM

## 2022-05-19 RX ORDER — INSULIN LISPRO 100 [IU]/ML
0-10 INJECTION, SOLUTION INTRAVENOUS; SUBCUTANEOUS
Status: DISCONTINUED | OUTPATIENT
Start: 2022-05-19 | End: 2022-05-23 | Stop reason: HOSPADM

## 2022-05-19 RX ORDER — ONDANSETRON 2 MG/ML
4 INJECTION INTRAMUSCULAR; INTRAVENOUS EVERY 6 HOURS PRN
Status: DISCONTINUED | OUTPATIENT
Start: 2022-05-19 | End: 2022-05-23 | Stop reason: HOSPADM

## 2022-05-19 RX ORDER — SODIUM CHLORIDE 0.9 % (FLUSH) 0.9 %
5-40 SYRINGE (ML) INJECTION PRN
Status: DISCONTINUED | OUTPATIENT
Start: 2022-05-19 | End: 2022-05-23 | Stop reason: HOSPADM

## 2022-05-19 RX ADMIN — INSULIN GLARGINE-YFGN 12 UNITS: 100 INJECTION, SOLUTION SUBCUTANEOUS at 12:01

## 2022-05-19 RX ADMIN — ASPIRIN 81 MG: 81 TABLET, COATED ORAL at 12:01

## 2022-05-19 RX ADMIN — LEVETIRACETAM 500 MG: 500 TABLET, FILM COATED ORAL at 21:48

## 2022-05-19 RX ADMIN — AMLODIPINE BESYLATE 10 MG: 10 TABLET ORAL at 12:00

## 2022-05-19 RX ADMIN — SODIUM CHLORIDE, PRESERVATIVE FREE 10 ML: 5 INJECTION INTRAVENOUS at 12:02

## 2022-05-19 RX ADMIN — INSULIN GLARGINE-YFGN 12 UNITS: 100 INJECTION, SOLUTION SUBCUTANEOUS at 21:48

## 2022-05-19 RX ADMIN — HEPARIN SODIUM 5000 UNITS: 10000 INJECTION INTRAVENOUS; SUBCUTANEOUS at 22:12

## 2022-05-19 RX ADMIN — LEVETIRACETAM 500 MG: 500 TABLET, FILM COATED ORAL at 12:00

## 2022-05-19 RX ADMIN — ATORVASTATIN CALCIUM 40 MG: 40 TABLET, FILM COATED ORAL at 21:48

## 2022-05-19 RX ADMIN — INSULIN LISPRO 2 UNITS: 100 INJECTION, SOLUTION INTRAVENOUS; SUBCUTANEOUS at 19:26

## 2022-05-19 RX ADMIN — CARVEDILOL 25 MG: 25 TABLET, FILM COATED ORAL at 12:00

## 2022-05-19 RX ADMIN — INSULIN LISPRO 2 UNITS: 100 INJECTION, SOLUTION INTRAVENOUS; SUBCUTANEOUS at 21:48

## 2022-05-19 RX ADMIN — SODIUM CHLORIDE, PRESERVATIVE FREE 10 ML: 5 INJECTION INTRAVENOUS at 21:00

## 2022-05-19 RX ADMIN — IOPAMIDOL 75 ML: 755 INJECTION, SOLUTION INTRAVENOUS at 00:57

## 2022-05-19 RX ADMIN — HEPARIN SODIUM 5000 UNITS: 10000 INJECTION INTRAVENOUS; SUBCUTANEOUS at 18:39

## 2022-05-19 RX ADMIN — PIPERACILLIN AND TAZOBACTAM 4500 MG: 4; .5 INJECTION, POWDER, LYOPHILIZED, FOR SOLUTION INTRAVENOUS at 02:08

## 2022-05-19 RX ADMIN — DOCUSATE SODIUM 100 MG: 100 CAPSULE, LIQUID FILLED ORAL at 21:47

## 2022-05-19 RX ADMIN — OXYCODONE HYDROCHLORIDE 10 MG: 10 TABLET ORAL at 12:00

## 2022-05-19 RX ADMIN — OXYCODONE HYDROCHLORIDE 10 MG: 10 TABLET ORAL at 02:51

## 2022-05-19 RX ADMIN — PANTOPRAZOLE SODIUM 40 MG: 40 TABLET, DELAYED RELEASE ORAL at 12:00

## 2022-05-19 RX ADMIN — CARVEDILOL 25 MG: 25 TABLET, FILM COATED ORAL at 18:39

## 2022-05-19 ASSESSMENT — PAIN SCALES - GENERAL
PAINLEVEL_OUTOF10: 0
PAINLEVEL_OUTOF10: 7
PAINLEVEL_OUTOF10: 7

## 2022-05-19 ASSESSMENT — PAIN DESCRIPTION - LOCATION
LOCATION: LEG
LOCATION: HIP

## 2022-05-19 ASSESSMENT — PAIN DESCRIPTION - ORIENTATION
ORIENTATION: RIGHT
ORIENTATION: RIGHT

## 2022-05-19 ASSESSMENT — LIFESTYLE VARIABLES: HOW OFTEN DO YOU HAVE A DRINK CONTAINING ALCOHOL: NEVER

## 2022-05-19 NOTE — CONSULTS
Department of Orthopedic Surgery  Resident Consult Note          Reason for Consult: Right Hip Pain    HISTORY OF PRESENT ILLNESS:    53-year-old male presented to [facility] emergency department for right hip pain. Patient is nonverbal and communicates with nodding his head yes or no. Patient admits to right hip pain with some radiating symptoms to the knee. Patient denies paresthesia, numbness, tingling by nodding his head no. Patient points to anterior right hip where the majority of his pain resides. Patient shakes his head no when prompted to range his hip and knee. Presumably decreased range of motion secondary to pain. Patient denies fever, chills, nausea, vomiting, chest pain or shortness of breath. 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 Patient does reside in a skilled nursing facility. Upon arrival patient was febrile with a fever of 102.8. Patient was seen by Dr. Kris Garcia 6/2021 for intra-articular distal phalanx of the fifth finger in the left hand. No other complaints at this time. Past Medical History:        Diagnosis Date    [facility]     Diabetes mellitus [facility]     Hypertension     Seizures ([facility]      Past Surgical History:    History reviewed. No pertinent surgical history. Current Medications:   No current facility-administered medications for this encounter. Allergies:  Patient has no known allergies. Social History:   TOBACCO:   reports that he has never smoked. He has never used smokeless tobacco.  ETOH:   reports no history of alcohol use. DRUGS:   reports previous drug use. ACTIVITIES OF DAILY LIVING:    OCCUPATION:    Family History:   History reviewed. No pertinent family history.     REVIEW OF SYSTEMS:  CONSTITUTIONAL:  negative for  fevers, chills  EYES:  negative for blurred vision, visual disturbance  HEENT:  negative for  hearing loss, voice change  RESPIRATORY:  negative for  dyspnea, wheezing  CARDIOVASCULAR:  negative for  chest pain, palpitations  GASTROINTESTINAL:  negative for nausea, vomiting  GENITOURINARY:  negative for frequency, urinary incontinence  HEMATOLOGIC/LYMPHATIC:  negative for bleeding and petechiae  MUSCULOSKELETAL:  positive for  pain and decreased range of motion  NEUROLOGICAL:  negative for headaches, dizziness  BEHAVIOR/PSYCH:  negative for increased agitation and anxiety    PHYSICAL EXAM:    VITALS:  /79   Pulse 79   Temp 98 °F (36.7 °C) (Oral)   Resp 22   Ht 5' 10\" (1.778 m)   Wt 180 lb (81.6 kg)   SpO2 92%   BMI 25.83 kg/m²   CONSTITUTIONAL:  awake, alert, cooperative,mild apparent distress, and appears stated age  MUSCULOSKELETAL:  Right lower Extremity:   Shortened and externally rotated   +Log roll   + Heel Strike   -TTP over Knee and Ankle   Skin intact with no signs of degloving   Compartments soft and compressible, calf non-tender   +DP & PT pulses, Brisk Cap refill, Toes warm and perfused   Sensation grossly intact superficial/deep peroneal,saphenous,sural,tibial n. distributions  · +GS/TA/EHL. Secondary Exam:   · bilateralUE: No obvious signs of trauma. -TTP to fingers, hand, wrist, forearm, elbow, humerus, shoulder or clavicle. -- Patient able to flex/extend fingers, wrist, elbow and shoulder with active and passive ROM without pain, +2/4 Radial pulse, cap refill <3sec, +AIN/PIN/Radial/Ulnar/Median N, distal sensation grossly intact to C4-T1 dermatomes, compartments soft and compressible. · leftLE: No obvious signs of trauma. -TTP to foot, ankle, leg, knee, thigh, hip.-- Patient able to flex/extend toes, ankle, knee and hip with active and passive ROM without pain,+2/4 DP & PT pulses, cap refill <3sec, +5/5 PF/DF/EHL, distal sensation grossly intact to L4-S1 dermatomes, compartments soft and compressible.     · Pelvis: -TTP, -Log roll, -Heel strike     DATA:    CBC:   Lab Results   Component Value Date    WBC 11.8 05/18/2022    RBC 5.07 05/18/2022    HGB 13.8 05/18/2022    HCT 40.9 05/18/2022    MCV 80.7 05/18/2022    MCH 27.2 05/18/2022    MCHC 33.7 05/18/2022    RDW 12.8 05/18/2022     05/18/2022    MPV 10.0 05/18/2022     PT/INR:    Lab Results   Component Value Date    PROTIME 12.0 05/24/2021    INR 1.1 05/24/2021     Lactic Acid :   Lab Results   Component Value Date    LACTA 1.9 05/25/2016       Radiology Review:  CT abdomen demonstrating degenerative changes at the hip. There is notable fracture on the right femoral neck that does not appear to be protruding through the head or intertrochanteric region. Patient is in valgus alignment. No other fractures or dislocations noted. IMPRESSION:   · Closed, Right Femoral neck Fracture    PLAN:  [facility] for surgery with Dr. Pam Dickson on 5/19/2022   Risk, benefits and treatment options were discussed and has verbalized understanding of options. The possibility of complications were also discussed to include but not limited to nerve damage, infection, problems with wound healing, vascular injury, chronic pain, stiffness, dysfunction, nonhealing of the bone, symptomatic hardware and/or its failure, need for subsequent surgery, dislocation, and blood clots as well as medical related problems and other problems not specifically discussed. Risk of anesthesia also discussed to include death. After all questions and concerns were address, agreed to proceed with the procedure.  NPO effective now, Needs medical assessment for surgery   RLE Non-weight bearing   Pre-op Labs and Imaging Completed   Pain control IV/PO   Hold Anticoagulation    Treatment consent   All questions and concerns from patient and family addressed, and patient is agreeable to plan.    Review and discuss with Dr. Pam Dickson      I have seen and evaluated the patient and agree with the above assessment on today's visit. I have performed the key components of the history and physical examination and concur completely with the findings and plans as documented. Agree with ROS, examination, FMH, PMH, PSH, SocHx, and allergies as above. Patient physically seen and examined. Patient with seizure disorder came in with complaints of right hip pain. Patient found to have a nearly horizontal femoral neck fracture starting in the superior neck and curving down to the inferior portion of his subcapital region. It was minimally displaced. Talk to the patient's family in detail about open reduction internal fixation. There is concerned putting arthroplasty him due to his seizure disorder and potential risk of dislocation. We talked through this with him in detail. Explained that if this would fail where he would get avascular necrosis of the femoral head or posttraumatic arthritis he could potentially require total hip arthroplasty and they understand this  I explained the risks and complications of surgery with the patient including but not limited to death from anesthesia, possible neurovascular damage, possible infection, possible nonunion, possible hardware failure, possible need for further surgery, etc.  Patient understood this, asked appropriate questions and decided to go forward with the procedure. Past Medical History:   Diagnosis Date    [facility] ([facility]     Diabetes mellitus ([facility]     Hypertension     Seizures ([facility]      History reviewed. No pertinent surgical history. History reviewed. No pertinent family history. Social History     Tobacco Use    Smoking status: Never Smoker    Smokeless tobacco: Never Used   Substance Use Topics    Alcohol use: Never    Drug use: Not Currently     No Known Allergies        Physical Examination:   General appearance: alert, well appearing, and in no distress,  normal appearing weight.  No visible signs of trauma Mental status: Patient is alert and he nods to questions. He does express pain appropriately when he moves his right hip. Abdomen: soft, nondistended  Resp:   resp easy and unlabored, no audible wheezes note, normal symmetrical expansion of both hemithoraces  Cardiac: distal pulses palpable, skin and extremities well perfused  Neurological: alert, oriented X3, normal speech, no focal findings or movement disorder noted, motor and sensory grossly normal bilaterally, normal muscle tone, no tremors  HEENT: normochephalic atraumatic, external ears and eyes normal, sclera normal, neck supple, no nasal discharge. Extremities:   peripheral pulses normal, no edema, redness or tenderness in the calves   Skin: normal coloration, no rashes or open wounds, no suspicious skin lesions noted  Psych: Affect euthymic   Musculoskeletal:   Extremity:  Agree with above examination. Open reduction internal fixation right femoral neck fracture with femoral neck system. ELECTRONICALLY signed by:    Tavon Lowe MD  5/20/22   This is been dictated utilizing voice recognition software. All efforts have been made to make the note accurate although inadvertent errors may be present.

## 2022-05-19 NOTE — PROGRESS NOTES
Occupational Therapy    Date:2022  Patient Name: Landon Howell  MRN: [id number]  : 1970  Room: [address] [place] [address]     OT orders received and chart reviewed. OT eval on hold at this time as plan is for surgical intervention today (22) d/t closed displaced fracture of R femoral neck. OT will follow and re-attempt eval as appropriate, pending ortho POC, at a later time/date.     Alaina Munoz OTR/L; [id number]

## 2022-05-19 NOTE — ED NOTES
Report called, SBAR faxed, patient marked ready for transport.      Ivett Villarreal RN  05/19/22 0908

## 2022-05-19 NOTE — CONSULTS
Christine Arredondo  1970  Date of Service: 5/19/2022    Reason for Consultation: We were asked to see Christine Arredondo by Dr. Yojana Enriquez MD  regarding preop cardiac risk stratification. History of Chief Complaint: This is a 45 y.o. male not previously known to our group. They have a history of a prior CVA with residual aphasia and right-sided paralysis. He cannot communicate. He nods his head \"yes\" to every question including when I ask him to shake his head \"no\". He also nods yes when I ask him to move his right arm which he cannot do. Reportedly, he lives in an assisted living facility. His baseline activity is transferring from chair to bed. He fell resulting in a femoral neck fracture. Orthopedics is planning to pursue an ORIF. This admission he also had a fever and hypoxemia.     REVIEW OF SYSTEMS:   Unobtainable    CURRENT MEDICATIONS:  Current Facility-Administered Medications   Medication Dose Route Frequency Provider Last Rate Last Admin    sodium chloride flush 0.9 % injection 5-40 mL  5-40 mL IntraVENous 2 times per day April QI Kruse - CNP   10 mL at 05/19/22 1202    sodium chloride flush 0.9 % injection 5-40 mL  5-40 mL IntraVENous PRN April RADHA KruseN - CNP        0.9 % sodium chloride infusion   IntraVENous PRN April RADHA KruseN - TERRELL        acetaminophen (TYLENOL) tablet 650 mg  650 mg Oral Q6H PRN April QI Kruse - CNP        Or    acetaminophen (TYLENOL) suppository 650 mg  650 mg Rectal Q6H PRN April RADHA KruseN - CNP        bisacodyl (DULCOLAX) suppository 10 mg  10 mg Rectal Daily PRN April RADHA KruseN - CNP        trimethobenzamide Verline Board) injection 200 mg  200 mg IntraMUSCular Q6H PRN April QI Kruse - TERRELL        amLODIPine (NORVASC) tablet 10 mg  10 mg Oral Daily April QI Kruse - CNP   10 mg at 05/19/22 1200    aspirin EC tablet 81 mg  81 mg Oral Daily April [place]calo[place]-Manpreet, APRN - CNP   81 mg at 05/19/22 1201    atorvastatin (LIPITOR) tablet 40 mg  40 mg Oral Nightly April Storey-Pender, APRN - CNP        carvedilol (COREG) tablet 25 mg  25 mg Oral BID WC April [place]-Pender, APRN - CNP   25 mg at 05/19/22 1839    levETIRAcetam (KEPPRA) tablet 500 mg  500 mg Oral BID April Gerryy-Cornelius, APRN - CNP   500 mg at 05/19/22 1200    glucose chewable tablet 16 g  4 tablet Oral PRN April [place]-Manpreet, APRN - CNP        glucagon (rDNA) injection 1 mg  1 mg IntraMUSCular PRN April Gerryy-Cornelius, APRN - CNP        dextrose 5 % solution  100 mL/hr IntraVENous PRN April Gerry-Manpreet, APRN - CNP        morphine (PF) injection 1 mg  1 mg IntraVENous Q6H PRN April [place]-Manpreet, APRN - CNP        oxyCODONE (ROXICODONE) immediate release tablet 5 mg  5 mg Oral Q4H PRN Alexandra Deal, DO        Or    oxyCODONE HCl (OXY-IR) immediate release tablet 10 mg  10 mg Oral Q4H PRN Alexandra Deal, DO   10 mg at 05/19/22 1200    insulin lispro (HUMALOG) injection vial 0-10 Units  0-10 Units SubCUTAneous 4x Daily AC & HS Mikey Barrera, DO   2 Units at 05/19/22 1926    docusate sodium (COLACE) capsule 100 mg  100 mg Oral Nightly Mikey Barrera, DO        pantoprazole (PROTONIX) tablet 40 mg  40 mg Oral QAM AC Mikey Barrera, DO   40 mg at 05/19/22 1200    ondansetron (ZOFRAN) injection 4 mg  4 mg IntraVENous Q6H PRN Mateusz Cavanaugh, DO        insulin glargine-yfgn [facility]) injection vial 12 Units  12 Units SubCUTAneous BID Mateusz Fine, DO   12 Units at 05/19/22 1201    dextrose 50 % IV solution  25 g IntraVENous PRN Mikey Barrera, DO        heparin (porcine) injection 5,000 Units  5,000 Units SubCUTAneous Q8H Mikey Barrera, DO   5,000 Units at 05/19/22 1839        ALLERGIES:  No Known Allergies    MEDICAL HISTORY:  Past Medical History:   Diagnosis Date    [facility]     Diabetes mellitus ([facility]     Hypertension     Seizures ([facility]        SURGICAL HISTORY:  History reviewed. No pertinent surgical history. FAMILY HISTORY:  History reviewed. No pertinent family history. SOCIAL HISTORY:  Social History     Socioeconomic History    Marital status: Single     Spouse name: None    Number of children: None    Years of education: None    Highest education level: None   Occupational History    None   Tobacco Use    Smoking status: Never Smoker    Smokeless tobacco: Never Used   Substance and Sexual Activity    Alcohol use: Never    Drug use: Not Currently    Sexual activity: Never   Other Topics Concern    None   Social History Narrative    ** Merged History Encounter **          Social Determinants of Health     Financial Resource Strain:     Difficulty of Paying Living Expenses: Not on file   Food Insecurity:     Worried About Running Out of Food in the Last Year: Not on file    Dianelys of Food in the Last Year: Not on file   Transportation Needs:     Lack of Transportation (Medical): Not on file    Lack of Transportation (Non-Medical):  Not on file   Physical Activity:     Days of Exercise per Week: Not on file    Minutes of Exercise per Session: Not on file   Stress:     Feeling of Stress : Not on file   Social Connections:     Frequency of Communication with Friends and Family: Not on file    Frequency of Social Gatherings with Friends and Family: Not on file    Attends Judaism Services: Not on file    Active Member of Clubs or Organizations: Not on file    Attends Club or Organization Meetings: Not on file    Marital Status: Not on file   Intimate Partner Violence:     Fear of Current or Ex-Partner: Not on file    Emotionally Abused: Not on file    Physically Abused: Not on file    Sexually Abused: Not on file   Housing Stability:     Unable to Pay for Housing in the Last Year: Not on file    Number of [place] in the Last Year: Not on file    Unstable Housing in the Last Year: Not on file       PHYSICAL EXAM:  Vitals:    05/19/22 0345 05/19/22 0509 05/19/22 0530 05/19/22 1215   BP:   106/78 134/82   Pulse:   89 84   Resp:   22 18   Temp:   97 °F (36.1 °C) 97.3 °F (36.3 °C)   TempSrc:   Temporal Temporal   SpO2: (!) 83% 94% 93% 93%   Weight:       Height:           GENERAL:  He is alert and oriented x 1, he is not able to communicate as described above. NECK:  No masses, trachea is mid position. Supple, full ROM, no JVD or bruits. No palpable thyromegaly or lymphadenopathy. HEART: Distant to auscultation. Regular rate and rhythm. Normal S1 and S2. There are no abnormal murmurs. No heaves. LUNGS:  Clear to auscultation bilaterally. No use of accessory muscles. Poor effort. Not following commands very well. ABDOMEN:  Soft, non-tender. Normal bowel sounds. EXTREMITIES: His right side is paralyzed. No bilateral lower extremity edema. Good distal pulses. EYES:  PERRL, normal lids & conjunctiva. No icterus. ENT: no external masses, no bleeding. Moist mucosa. Normal lips formation. NEURO: Right side is paralyzed, EOMI, no tremors. SKIN:  Warm, dry and intact. Normal turgor, no petechia. Phych: As above. .  Currently not agitated or anxious. EKG: Pending. Labs:  Recent Labs     05/18/22 2109 05/19/22  0109 05/19/22  0618   WBC 11.8* 9.7 10.6   HGB 13.8 13.5 14.1   HCT 40.9 40.7 42.7   MCV 80.7 80.9 81.3    181 196     Recent Labs     05/18/22  2109 05/19/22  0109 05/19/22  0618    133 138   K 4.0 4.2 4.0    102 105   CO2 22 21* 21*   BUN 16 12 13   CREATININE 1.0 0.9 1.1     Recent Labs     05/19/22  0109   PROTIME 15.9*   INR 1.4     No results for input(s): CKTOTAL, CKMB, CKMBINDEX, TROPONINI in the last 72 hours. No results for input(s): BNP in the last 72 hours. No results for input(s): CHOL, HDL, TRIG in the last 72 hours.     Invalid input(s): CHOLHDLR, LDLCALCU  No results for input(s): MARIANELA in the last 72 hours. Assessment:   1. COVID. I did go into the room, attempted to communicate with the patient, and performed a physical exam.  2. Fall with femoral neck fracture planning for ORIF. 3. He performs very limited physical activities and cannot communicate. He is having no active acute ischemic cardiac or coronary symptoms. However, I cannot risk assess further than that. This is not an elective surgery. He does have some risk factors. Based on his risk factors and situation he would be classified as intermediate risk for his orthopedic surgery which is an intermediate risk procedure. I recommend following and maintaining good heart rate and blood pressure control and monitoring perioperatively. Continue his Coreg and Norvasc perioperatively. I would not perform any preop cardiac testing. 4. Hypertension  5. Diabetes  6. Hypercholesterolemia  7. CVA with residual right-sided paralysis and aphasia. 8. Seizure disorder  9. Failure to thrive  10. Frequent falls. Recommendations:  1. He performs very limited physical activities and cannot communicate. He is having no active acute ischemic cardiac or coronary symptoms. However, I cannot risk assess further than that. This is not an elective surgery. He does have some risk factors. Based on his risk factors and situation he would be classified as intermediate risk for his orthopedic surgery which is an intermediate risk procedure. I recommend following and maintaining good heart rate and blood pressure control and monitoring perioperatively. Continue his Coreg and Norvasc perioperatively. I would not perform any preop cardiac testing. Thank you for allowing me to participate in your patient's care. [address], [address] [facility], [address] [facility]  Interventional Cardiology    Note: This report was completed using computerized voice recognition software.  Every effort has been made to ensure accuracy, however; and invert and computerized transcription errors may be present.

## 2022-05-19 NOTE — ED NOTES
Radiology Procedure Waiver   Name: Morteza Batista  : 1970  MRN: [id number]    Date:  22    Time: 12:25 AM EDT    Benefits of immediately proceeding with Radiology exam(s) without pre-testing outweigh the risks or are not indicated as specified below and therefore the following is/are being waived:     Patient is able to receive repeat dye load for CT soft tissue neck. [] Pregnancy test   [] Patients LMP on-time and regular.   [] Patient had Tubal Ligation or has other Contraception Device. [] Patient  is Menopausal or Premenarcheal.    [] Patient had Full or Partial Hysterectomy. [] Protocol for Iodine allergy    [] MRI Questionnaire     [] BUN/Creatinine   [] Patient age w/no hx of renal dysfunction. [] Patient on Dialysis. [] Recent Normal Labs.   Electronically signed by Giulia Gold DO on 22 at 12:25 AM EDT               Giulia Gold DO  Resident  [phone]

## 2022-05-19 NOTE — DISCHARGE INSTR - COC
Continuity of Care Form    Patient Name: Leighton Braswell   :  1970  MRN:  [id number]    Admit date:  2022  Discharge date:  2022    Code Status Order: Full Code   Advance Directives:      Admitting Physician:  No admitting provider for patient encounter. PCP: Radha Lewis MD    Discharging Nurse: [address] Unit/Room#: 9791/5883-E  Discharging Unit Phone Number: [phone]    Emergency Contact:   Extended Emergency Contact Information  Primary Emergency Contact: [facility]  Address: [address] [place] [address] Phone: [phone]  Relation: Other  Secondary Emergency Contact: No,Per Pt  Relation: Other    Past Surgical History:  History reviewed. No pertinent surgical history. Immunization History:   Immunization History   Administered Date(s) Administered    COVID-19, Pfizer Purple top, DILUTE for use, 12+ yrs, 30mcg/0.3mL dose 2021, 10/28/2021    Tdap (Boostrix, Adacel) 2021       Active Problems:  Patient Active Problem List   Diagnosis Code    Failure to thrive in adult R62.7    Left hip pain M25.552    Renal cyst N28.1    Fracture of phalanx of left little finger S62.607A    Closed displaced fracture of right femoral neck ([address] S72.001A    History of CVA (cerebrovascular accident) Z86.73    History of seizures Z87.898    Primary hypertension I10    Type 2 diabetes mellitus ([address] E11.9    Sepsis ([address] A41.9    Debbie Tolbert       Isolation/Infection:   Isolation            Droplet Plus          Patient Infection Status       Infection Onset Added Last Indicated Last Indicated By Review Planned Expiration Resolved Resolved By    COVID-19 22 Respiratory Panel, Molecular, with COVID-19 (Restricted: peds pts or suitable admitted adults) 22      Resolved    COVID-19 (Rule Out) 22 Respiratory Panel, Molecular, with COVID-19 (Restricted: peds pts or suitable admitted adults) (Ordered)   05/19/22 Rule-Out Test Resulted    COVID-19 (Rule Out) 05/18/22 05/18/22 05/18/22 COVID-19, Rapid (Ordered)   05/18/22 Rule-Out Test Resulted            Nurse Assessment:  Last Vital Signs: /82   Pulse 84   Temp 97.3 °F (36.3 °C) (Temporal)   Resp 18   Ht 5' 10\" (1.778 m)   Wt 180 lb (81.6 kg)   SpO2 93%   BMI 25.83 kg/m²     Last documented pain score (0-10 scale): Pain Level: 7  Last Weight:   Wt Readings from Last 1 Encounters:   05/18/22 180 lb (81.6 kg)     Mental Status:  oriented with confusion    IV Access:  - None    Nursing Mobility/ADLs:  Walking   {[facility] DME MSNW:199025278}  Transfer  {[facility] DME DKXT:056866356}  Bathing  {[facility] DME AIJV:857729132}  Dressing  {[facility] DME ZLVX:091719825}  Toileting  {[facility] DME OXQX:164237908}  Feeding  {[facility] DME NEYB:149519667}  Med Admin  {[facility] DME AWZI:885079399}  Med Delivery   {[facility] MED Delivery:048787773}    Wound Care Documentation and Therapy:  Wound 05/24/21 Pedal Anterior;Right 1cm x 1cm round (Active)   Number of days: 359        Elimination:  Continence: Bowel: Yes  Bladder: Yes  Urinary Catheter: None   Colostomy/Ileostomy/Ileal Conduit: No       Date of Last BM: ***  No intake or output data in the 24 hours ending 05/19/22 1304  No intake/output data recorded. Safety Concerns:     [address] [facility] Safety Concerns:813234442}    Impairments/Disabilities:      Speech, Language Barrier - ***, and Contractures - ***    Nutrition Therapy:  Current Nutrition Therapy:   { [facility] Diet List:556351490}    Routes of Feeding: Oral  Liquids: No Restrictions  Daily Fluid Restriction: no  Last Modified Barium Swallow with Video (Video Swallowing Test): not done    Treatments at the Time of Hospital Discharge:   Respiratory Treatments: ***  Oxygen Therapy:  is not on home oxygen therapy.   Ventilator:    - No ventilator support    Rehab Therapies: Physical Therapy and Occupational Therapy  Weight Bearing Status/Restrictions: No weight bearing restrictions  Other Medical Equipment (for information only, NOT a DME order):  wheelchair  Other Treatments: ***    Patient's personal belongings (please select all that are sent with patient):  None    RN SIGNATURE:  Electronically signed by Rani Tovar RN on 5/23/22 at 3:47 PM EDT    CASE MANAGEMENT/SOCIAL WORK SECTION    Inpatient Status Date: 05/19/2022    Readmission Risk Assessment Score:  Readmission Risk              Risk of Unplanned Readmission:  11           Discharging to Facility/ Agency   Name: [facility]  Address: [address] [place] [address], [address] [place] [address]  Phone: [phone]  Fax: [phone]    Dialysis Facility (if applicable)   Name:  Address:  Dialysis Schedule:  Phone:  Fax:    / signature: Electronically signed by Nunu Lopes RN on 5/19/22 at 1:05 PM EDT    PHYSICIAN SECTION    Prognosis: {Prognosis:6064167236}    Condition at Discharge: [address] [place] [address] Patient Condition:864219500}    Rehab Potential (if transferring to Rehab): {Prognosis:7619740435}    Recommended Labs or Other Treatments After Discharge: ***    Physician Certification: I certify the above information and transfer of Lilian Rascon  is necessary for the continuing treatment of the diagnosis listed and that he requires Intermediate Nursing Care for greater 30 days.      Update Admission H&P: {CHP DME Changes in REDJX:785900040}    PHYSICIAN SIGNATURE:  Electronically signed by Kwame Carlisle DO on 5/23/22 at 11:09 AM EDT

## 2022-05-19 NOTE — CONSULTS
[facility] CONSULT NOTE    Reason for Consult: Fever    Requested by: QI Pike CNP     Chief complaint: Right hip pain    History Obtained From: EMR    HISTORY OFPRESENT ILLNESS              The patient is a 45 y.o. male with history of left MCA stroke causing aphasia, DM, hypertension, presented on 05/18 with right hip pain found to have closed right femoral neck fracture. On admission, he had fever of 102.8 °F, hypoxemia requiring 3-4 Lpm oxygen by nasal cannula, no leukocytosis. Respiratory pathogen PCR panel was positive for SARS-CoV-2 PCR. Chest x-ray was unremarkable. Urinalysis showed no pyuria. CT abdomen and pelvis was unremarkable except for high suspicion for right femoral neck fracture. CT of the neck soft tissue showed stranding changes within the posterior subcutaneous tissues of the neck in the midline likely representing old inflammation/infection, no abscess collection. Piperacillin-tazobactam was started on 05/19. ID service was subsequently consulted for further recommendations.     Past Medical History  Past Medical History:   Diagnosis Date    Cerebral artery occlusion with [facility] infarction [facility]     Diabetes mellitus ([facility]     Hypertension     Seizures ([facility])        Current Facility-Administered Medications   Medication Dose Route Frequency Provider Last Rate Last Admin    sodium chloride flush 0.9 % injection 5-40 mL  5-40 mL IntraVENous 2 times per day QI Pike CNP        sodium chloride flush 0.9 % injection 5-40 mL  5-40 mL IntraVENous PRN April QI Kruse CNP        0.9 % sodium chloride infusion   IntraVENous PRN April QI Kruse CNP        enoxaparin (LOVENOX) injection 40 mg  40 mg SubCUTAneous Daily April QI Kruse CNP        acetaminophen (TYLENOL) tablet 650 mg  650 mg Oral Q6H PRN April QI Kruse CNP        Or    acetaminophen (TYLENOL) suppository 650 mg  650 mg Rectal Q6H PRN April Storey-Cornelius, APRN - CNP        bisacodyl (DULCOLAX) suppository 10 mg  10 mg Rectal Daily PRN April Storey-Cornelius, APRN - CNP        trimethobenzamide Syliva Yony) injection 200 mg  200 mg IntraMUSCular Q6H PRN April Storey-Cornelius, APRN - CNP        amLODIPine (NORVASC) tablet 10 mg  10 mg Oral Daily April Storey-Cornelius, APRN - CNP        aspirin EC tablet 81 mg  81 mg Oral Daily April Storey-Cornelius, APRN - CNP        atorvastatin (LIPITOR) tablet 40 mg  40 mg Oral Nightly April Storey-Manpreet, APRN - CNP        carvedilol (COREG) tablet 25 mg  25 mg Oral BID  April Storey-Cornelius, APRN - CNP        levETIRAcetam (KEPPRA) tablet 500 mg  500 mg Oral BID April Storey-Cornelius, APRN - CNP        glucose chewable tablet 16 g  4 tablet Oral PRN April Storey-Cornelius, APRN - CNP        glucagon (rDNA) injection 1 mg  1 mg IntraMUSCular PRN April Storey-Cornelius, APRN - CNP        dextrose 5 % solution  100 mL/hr IntraVENous PRN April Storey-Cornelius, APRN - CNP        morphine (PF) injection 1 mg  1 mg IntraVENous Q6H PRN April Storey-Cornelius, APRN - CNP        oxyCODONE (ROXICODONE) immediate release tablet 5 mg  5 mg Oral Q4H PRN Edgar Oms, DO        Or    oxyCODONE HCl (OXY-IR) immediate release tablet 10 mg  10 mg Oral Q4H PRN [place] Oms, DO   10 mg at 05/19/22 0251    piperacillin-tazobactam (ZOSYN) 3,375 mg in dextrose 5 % 100 mL IVPB extended infusion (mini-bag)  3,375 mg IntraVENous Q8H April Storey-Cornelius, APRN - CNP        insulin lispro (HUMALOG) injection vial 0-10 Units  0-10 Units SubCUTAneous 4x Daily AC & HS Mikey Travon Berkley, DO        docusate sodium (COLACE) capsule 100 mg  100 mg Oral Nightly Mikey Travon Berkley, DO        pantoprazole (PROTONIX) tablet 40 mg  40 mg Oral QAM AC Mikey Herrerasroel Berkley,         ondansetron [facility]) injection 4 mg  4 mg IntraVENous Q6H PRN Anna Fofana DO        insulin glargine-[facility]) injection vial 12 Units  12 Units SubCUTAneous BID Martina Bora, DO        dextrose 50 % IV solution  25 g IntraVENous PRN Martina Bora, DO           No Known Allergies    Surgical History  History reviewed. No pertinent surgical history. Social History  Social History     Socioeconomic History    Marital status: Single   Tobacco Use    Smoking status: Never Smoker    Smokeless tobacco: Never Used   Substance and Sexual Activity    Alcohol use: Never    Drug use: Not Currently         Family Medical History  History reviewed. No pertinent family history. Review of Systems:  Unable to obtain due to patient aphasic and not responding to . Physical Examination:  Vitals:    05/19/22 0231 05/19/22 0345 05/19/22 0509 05/19/22 0530   BP: 125/85   106/78   Pulse: 86   89   Resp: 18   22   Temp:    97 °F (36.1 °C)   TempSrc:    Temporal   SpO2: (!) 4% (!) 83% 94% 93%   Weight:       Height:         Constitutional: Awake, not in distress  Eyes: Sclerae anicteric, no conjunctival erythema  ENT: No buccal lesion, no pharyngeal exudates  Neck: No nuchal rigidity, no cervical adenopathy  Lungs: Clear breath sounds, no crackles, no wheezes  Heart: Regular rate and rhythm, no murmurs  Abdomen: Bowel sounds present, soft, nontender  Skin: Warm and dry, no active dermatoses  Musculoskeletal: No joint erythema, no joint swelling    Labs, imaging, and medical records/notes were personally reviewed. Assessment:  Fever, suspect associated with COVID-19  Acute hypoxic respiratory failure  Right femoral neck fracture    Plan:  Check procalcitonin. Stop piperacillin-tazobactam. Monitor clinically off antibiotics for now except for perioperative antibiotic prophylaxis as per standard protocol. Follow up blood cultures. Plan for ORIF is noted. Monitor respiratory status. Wean off oxygen as tolerated. Continue supportive care.       Thank you for involving me in the care of Beto Mccann Nikolai Leone. I will continue to follow. Please do not hesitate to call for any questions or concerns.     Electronically signed by Carol Gillette MD on 5/19/2022 at 10:58 AM

## 2022-05-19 NOTE — ANESTHESIA PRE PROCEDURE
Department of Anesthesiology  Preprocedure Note       Name:  Maia Homans   Age:  45 y.o.  :  1970                                          MRN:  [id number]         Date:  2022      Surgeon: Carson Crum):  Hamzah Eisenberg MD    Procedure: Procedure(s):  HIP OPEN REDUCTION INTERNAL FIXATION    Medications prior to admission:   Prior to Admission medications    Medication Sig Start Date End Date Taking?  Authorizing Provider   ASPIRIN LOW DOSE 81 MG EC tablet Take 81 mg by mouth daily  21   Historical Provider, MD   escitalopram (LEXAPRO) 20 MG tablet Take 20 mg by mouth daily   Patient not taking: Reported on 2021   Historical Provider, MD   TRESIBA FLEXTOUCH 100 UNIT/ML SOPN Inject 25 Units into the skin daily  21   Historical Provider, MD   levETIRAcetam (KEPPRA) 500 MG tablet Take 500 mg by mouth 2 times daily    Historical Provider, MD   metFORMIN (GLUCOPHAGE) 1000 MG tablet Take 1,000 mg by mouth 2 times daily (with meals)  Patient not taking: Reported on 2021    Historical Provider, MD   carvedilol (COREG) 25 MG tablet Take 25 mg by mouth 2 times daily    Historical Provider, MD   atorvastatin (LIPITOR) 40 MG tablet Take 40 mg by mouth daily    Historical Provider, MD   losartan-hydrochlorothiazide (HYZAAR) 100-12.5 MG per tablet Take 1 tablet by mouth daily  Patient not taking: Reported on 2021    Historical Provider, MD   aspirin 81 MG tablet Take 81 mg by mouth daily    Historical Provider, MD   amLODIPine (NORVASC) 10 MG tablet Take 1 tablet by mouth daily 19   QI Vaz CNP       Current medications:    Current Facility-Administered Medications   Medication Dose Route Frequency Provider Last Rate Last Admin    sodium chloride flush 0.9 % injection 5-40 mL  5-40 mL IntraVENous 2 times per day April QI Kruse CNP        sodium chloride flush 0.9 % injection 5-40 mL  5-40 mL IntraVENous PRN April Storey-Stratford, APRN - CNP        0.9 % sodium chloride infusion   IntraVENous PRN April Storey-Stratford, APRN - CNP        enoxaparin (LOVENOX) injection 40 mg  40 mg SubCUTAneous Daily April Storey-Manpreet, APRN - CNP        acetaminophen (TYLENOL) tablet 650 mg  650 mg Oral Q6H PRN April Storey-Manpreet, APRN - CNP        Or    acetaminophen (TYLENOL) suppository 650 mg  650 mg Rectal Q6H PRN April Storey-Stratford, APRN - CNP        bisacodyl (DULCOLAX) suppository 10 mg  10 mg Rectal Daily PRN April Storey-Manpreet, APRN - CNP        trimethobenzamide Brooke Pates) injection 200 mg  200 mg IntraMUSCular Q6H PRN April Storey-Stratford, APRN - CNP        amLODIPine (NORVASC) tablet 10 mg  10 mg Oral Daily April Storey-Cornelius, APRN - CNP        aspirin EC tablet 81 mg  81 mg Oral Daily April Storey-Cornelius, APRN - CNP        atorvastatin (LIPITOR) tablet 40 mg  40 mg Oral Nightly April Storey-Cornelius, APRN - CNP        carvedilol (COREG) tablet 25 mg  25 mg Oral BID WC April Storey-Stratford, APRN - CNP        levETIRAcetam (KEPPRA) tablet 500 mg  500 mg Oral BID April Storey-Cornelius, APRN - CNP        glucose chewable tablet 16 g  4 tablet Oral PRN April Storey-Stratford, APRN - CNP        glucagon (rDNA) injection 1 mg  1 mg IntraMUSCular PRN April Storey-Cornelius, APRN - CNP        dextrose 5 % solution  100 mL/hr IntraVENous PRN April Storey-Cornelius, APRN - CNP        morphine (PF) injection 1 mg  1 mg IntraVENous Q6H PRN April Storey-Manpreet, APRN - CNP        oxyCODONE (ROXICODONE) immediate release tablet 5 mg  5 mg Oral Q4H PRN Samantha Peels, DO        Or    oxyCODONE HCl (OXY-IR) immediate release tablet 10 mg  10 mg Oral Q4H PRN Samantha Peels, DO   10 mg at 05/19/22 0251    piperacillin-tazobactam (ZOSYN) 3,375 mg in dextrose 5 % 100 mL IVPB extended infusion (mini-bag)  3,375 mg IntraVENous Q8H April QI Kruse - CNP        insulin lispro (HUMALOG) injection vial 0-10 Units  0-10 Units SubCUTAneous 4x Daily AC & HS Mikey Spann, DO        docusate sodium (COLACE) capsule 100 mg  100 mg Oral Nightly Mikey Spann, DO        pantoprazole (PROTONIX) tablet 40 mg  40 mg Oral QAM AC Mikey Spann, DO        ondansetron [facility]) injection 4 mg  4 mg IntraVENous Q6H PRN Ke Umana DO        insulin glargine-[facility]) injection vial 12 Units  12 Units SubCUTAneous BID Mikey Spann, DO        dextrose 50 % IV solution  25 g IntraVENous PRN Ke Umana DO           Allergies:  No Known Allergies    Problem List:    Patient Active Problem List   Diagnosis Code    Failure to thrive in adult R62.7    Left hip pain M25.552    Renal cyst N28.1    Fracture of phalanx of left little finger S62.607A    Closed displaced fracture of right femoral neck (HCC) S72.001A    History of CVA (cerebrovascular accident) Z80.78    History of seizures Z87.898    Primary hypertension I10    Type 2 diabetes mellitus ([facility] E11.9    Sepsis ([facility] A41.9    Fall W19. Veto Tomasz       Past Medical History:        Diagnosis Date    Cerebral artery occlusion with cerebral infarction ([facility]     Diabetes mellitus ([facility]     Hypertension     Seizures ([facility] [facility]        Past Surgical History:  History reviewed. No pertinent surgical history.     Social History:    Social History     Tobacco Use    Smoking status: Never Smoker    Smokeless tobacco: Never Used   Substance Use Topics    Alcohol use: Never                                Counseling given: Not Answered      Vital Signs (Current):   Vitals:    05/19/22 0231 05/19/22 0345 05/19/22 0509 05/19/22 0530   BP: 125/85   106/78   Pulse: 86   89   Resp: 18   22   Temp:    97 °F (36.1 °C)   TempSrc:    Temporal   SpO2: (!) 4% (!) 83% 94% 93%   Weight:       Height:                                                  BP Readings from Last 3 Encounters:   05/19/22 106/78   05/26/21 133/88   01/06/19 (!) 158/98 NPO Status:  > 8hrs                                                                               BMI:   Wt Readings from Last 3 Encounters:   05/18/22 180 lb (81.6 kg)   05/24/21 180 lb (81.6 kg)   05/25/16 160 lb (72.6 kg)     Body mass index is 25.83 kg/m². CBC:   Lab Results   Component Value Date    WBC 10.6 05/19/2022    RBC 5.25 05/19/2022    HGB 14.1 05/19/2022    HCT 42.7 05/19/2022    MCV 81.3 05/19/2022    RDW 12.9 05/19/2022     05/19/2022       CMP:   Lab Results   Component Value Date     05/19/2022    K 4.0 05/19/2022     05/19/2022    CO2 21 05/19/2022    BUN 13 05/19/2022    CREATININE 1.1 05/19/2022    GFRAA >60 05/19/2022    LABGLOM >60 05/19/2022    GLUCOSE 210 05/19/2022    GLUCOSE 232 08/07/2011    PROT 6.4 05/19/2022    CALCIUM 8.4 05/19/2022    BILITOT 0.5 05/19/2022    ALKPHOS 95 05/19/2022    AST 11 05/19/2022    ALT 15 05/19/2022       POC Tests: No results for input(s): POCGLU, POCNA, POCK, POCCL, POCBUN, POCHEMO, POCHCT in the last 72 hours.     Coags:   Lab Results   Component Value Date    PROTIME 15.9 05/19/2022    INR 1.4 05/19/2022    APTT 21.6 05/24/2021       HCG (If Applicable): No results found for: PREGTESTUR, PREGSERUM, HCG, HCGQUANT     ABGs: No results found for: PHART, PO2ART, IWN3WUE, TCI3BFF, BEART, W8JYLDMD     Type & Screen (If Applicable):  No results found for: LABABO, LABRH    Drug/Infectious Status (If Applicable):  No results found for: HIV, HEPCAB    COVID-19 Screening (If Applicable):   Lab Results   Component Value Date    COVID19 DETECTED 05/19/2022           Anesthesia Evaluation  Nursing notes reviewed  Airway: Mallampati: Unable to assess / NA        Dental:    (+) other      Pulmonary:   (+) decreased breath sounds,                            ROS comment: + COVID   Cardiovascular:    (+) hypertension:,         Rhythm: regular  Rate: normal                    Neuro/Psych:   (+) seizures:, CVA (aphasia, R upper extremity contracture): residual symptoms,              ROS comment: Non verbal GI/Hepatic/Renal:             Endo/Other:    (+) DiabetesType II DM, , .                 Abdominal:             Vascular: Other Findings:             Anesthesia Plan      general     ASA 4       Induction: intravenous. MIPS: Prophylactic antiemetics administered and Postoperative trial extubation. Anesthetic plan and risks discussed with patient. Plan discussed with CRNA.                   Juanis Patterson,    5/19/2022

## 2022-05-19 NOTE — PROGRESS NOTES
Physical Therapy      Name: Syed Herzog  : 1970  MRN: [id number]  Date of Service: 2022  Room #:  9368/5006-Y    PT order received. PT held - surgical intervention planned. PT will follow up as appropriate.     Gila Vazquez, PT, DPT  [id number]

## 2022-05-19 NOTE — H&P
Hospital Medicine History & Physical      PCP: Aissatou Vizcaino MD    Date of Admission: 5/18/2022    Date of Service: . May 19, 2022    Chief Complaint:   Right hip pain. History Of Present Illness:     45 y.o. male presented with right hip pain, found to have a closed right femoral neck fracture , he resides in a facility , and is non verbal but understands when asked questions. He was also found to be COVID POS  AND UPON PRESENTATION HE HAD A TEMP .8    Past Medical History:          Diagnosis Date    Cerebral artery occlusion with [facility] infarction ([facility]     Diabetes mellitus ([facility] [facility]     Hypertension     [facility]        Past Surgical History:      History reviewed. No pertinent surgical history. Medications Prior to Admission:      Prior to Admission medications    Medication Sig Start Date End Date Taking?  Authorizing Provider   ASPIRIN LOW DOSE 81 MG EC tablet Take 81 mg by mouth daily  5/8/21   Historical Provider, MD   escitalopram (LEXAPRO) 20 MG tablet Take 20 mg by mouth daily   Patient not taking: Reported on 6/11/2021 4/29/21   Historical Provider, MD NVAARRO FLEXTOUCH 100 UNIT/ML SOPN Inject 25 Units into the skin daily  5/21/21   Historical Provider, MD   levETIRAcetam (KEPPRA) 500 MG tablet Take 500 mg by mouth 2 times daily    Historical Provider, MD   metFORMIN (GLUCOPHAGE) 1000 MG tablet Take 1,000 mg by mouth 2 times daily (with meals)  Patient not taking: Reported on 6/11/2021    Historical Provider, MD   carvedilol (COREG) 25 MG tablet Take 25 mg by mouth 2 times daily    Historical Provider, MD   atorvastatin (LIPITOR) 40 MG tablet Take 40 mg by mouth daily    Historical Provider, MD   losartan-hydrochlorothiazide (HYZAAR) 100-12.5 MG per tablet Take 1 tablet by mouth daily  Patient not taking: Reported on 6/11/2021    Historical Provider, MD aspirin 81 MG tablet Take 81 mg by mouth daily    Historical Provider, MD   amLODIPine (NORVASC) 10 MG tablet Take 1 tablet by mouth daily 1/6/19   Mark Hoffmann, APRN - CNP       Allergies:  Patient has no known allergies. Social History:      The patient currently lives *IN a facility    TOBACCO:   reports that he has never smoked. He has never used smokeless tobacco.  ETOH:   reports no history of alcohol use. Family History:      **non verbal , no family present    REVIEW OF SYSTEMS:   Pertinent positives as noted in the HPI. All other systems reviewed and negative. PHYSICAL EXAM:    /82   Pulse 84   Temp 97.3 °F (36.3 °C) (Temporal)   Resp 18   Ht 5' 10\" (1.778 m)   Wt 180 lb (81.6 kg)   SpO2 93%   BMI 25.83 kg/m²     General appearance:  No apparent distress, appears stated age and cooperative. HEENT:  Normal cephalic, atraumatic without obvious deformity. Pupils equal, round, and reactive to light. Extra ocular muscles intact. Conjunctivae/corneas clear. Neck: Supple, with full range of motion. No jugular venous distention. Trachea midline. Respiratory:  Normal respiratory effort. Clear to auscultation, bilaterally without Rales/Wheezes/Rhonchi. Cardiovascular:  Regular rate and rhythm   Abdomen: Soft, non-tender, non-distended with normal bowel sounds. Musculoskeletal:  No clubbing, cyanosis or edema bilaterally. Skin: Skin color, texture, turgor normal.  No rashes or lesions.   Neurologic:  He has paralysis of his RUE AND RLE,  External rotation noted  Psychiatric:  Alert and oriented,         Labs:     Recent Labs     05/18/22 2109 05/19/22  0109 05/19/22  0618   WBC 11.8* 9.7 10.6   HGB 13.8 13.5 14.1   HCT 40.9 40.7 42.7    181 196     Recent Labs     05/18/22 2109 05/19/22  0109 05/19/22  0618    133 138   K 4.0 4.2 4.0    102 105   CO2 22 21* 21*   BUN 16 12 13   CREATININE 1.0 0.9 1.1   CALCIUM 8.4* 7.6* 8.4*     Recent Labs 05/18/22  2109 05/19/22  0109   AST 14 11   ALT 19 15   BILITOT 0.4 0.5   ALKPHOS 113 95     Recent Labs     05/19/22  0109   INR 1.4     No results for input(s): Les Karey in the last 72 hours. Urinalysis:      Lab Results   Component Value Date    NITRU Negative 05/18/2022    WBCUA NONE 05/18/2022    WBCUA 0-1 08/07/2011    BACTERIA NONE SEEN 05/18/2022    RBCUA NONE 05/18/2022    RBCUA 5-10 08/07/2011    BLOODU Negative 05/18/2022    SPECGRAV <=1.005 05/18/2022    GLUCOSEU 250 05/18/2022    GLUCOSEU >=1000 08/07/2011       Radiology:     CXR: I have reviewed the CXR with the following interpretation: *    XR HIP 2-3 VW W PELVIS RIGHT   Final Result   No acute abnormality of the hip. XR FEMUR RIGHT (MIN 2 VIEWS)   Final Result   No acute osseous abnormality. MRI would be the next best modality of choice   to evaluate for fracture if clinically warranted. CT SOFT TISSUE NECK W CONTRAST   Preliminary Result   Stranding changes within the posterior subcutaneous tissues of the neck in   the midline likely representing old inflammation/infection. No significant left-sided neck swelling or inflammation. No abscess   collection. Large old left MCA infarct. CT Head WO Contrast   Final Result   No acute intracranial abnormality. 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 Chronic infarct within the left cerebellum. CT Cervical Spine WO Contrast   Final Result   No acute abnormality of the cervical spine. CT ABDOMEN PELVIS W IV CONTRAST Additional Contrast? None   Final Result   High suspicion for right femoral neck fracture. XR CHEST PORTABLE   Final Result   No pneumonia or pleural effusion.              ASSESSMENT:    Active Hospital Problems    Diagnosis Date Noted    Closed displaced fracture of right femoral neck ([address] [S72.001A] 05/19/2022     Priority: Medium    History of CVA (cerebrovascular accident) [Z86.73] 05/19/2022     Priority: Medium    History of seizures [Z87.898] 05/19/2022     Priority: Medium    Primary hypertension [I10] 05/19/2022     Priority: Medium    Type 2 diabetes mellitus ([facility] [E11.9] 05/19/2022     Priority: Medium    Sepsis ([facility] [facility] [A41.9] 05/19/2022     Priority: Medium    Fall [W19. XXXA] 05/19/2022     Priority: Medium   HLD  SEIZURE DISORDER     PLAN:  WILL GET CARDIAC CLEARANCE  SSI  HEPARIN FOR DVE PROPHYLAXIS FOR NOW   LIPITOR  NORVASC        DVT Prophylaxis: *HEPARIN  Diet: Diet NPO  Code Status: Full Code    PT/OT Eval Status: WHEN STABLE    Dispo - *AMANDA    Electronically signed by Susan Oropeza DO on 5/19/2022 at 1:45 PM [facility]       Thank you Jenney Bence, MD for the opportunity to be involved in this patient's care.  If you have any questions or concerns please feel free to contact me at [phone]

## 2022-05-19 NOTE — CARE COORDINATION
5/19 Care Coordination. Patient was admit from ED yesterday for right hip pain from [facility]. CT Abd/Pel revealed high suspicion for right femoral neck fracture. Ortho was consulted, plans for ORIF pending cardio clearance. Patient positive for COVID per RFA, currently on 3L NC. ID consulted for sepsis. PT/OT to evaluate when appropriate. Spoke with Chris Lugo at [facility], she states patient is a long term bed hold and can return when medically stable. She is aware of positive COVID result and said patient can still return as they have COVID unit. KIAH completed. Completed ambulance paperwork in soft chart. Spoke with patient's sister Jimmy Rojo and neighbor Vee via phone, they confirmed plan is for patient to return to [facility] on discharge. Emergency contact phone numbers updated in Epic as all listed numbers were no longer in service.     Nunu Lopes, JENNIN, RN  [facility] Case Management   Cell: [phone]

## 2022-05-19 NOTE — PROGRESS NOTES
ID consult notified to office at this time, awaiting call back from office for provider taking consult.

## 2022-05-20 ENCOUNTER — APPOINTMENT (OUTPATIENT)
Dept: GENERAL RADIOLOGY | Age: 52
DRG: 710 | End: 2022-05-20
Payer: MEDICAID

## 2022-05-20 LAB
ANION GAP SERPL CALCULATED.3IONS-SCNC: 11 MMOL/L (ref 7–16)
BUN BLDV-MCNC: 17 MG/DL (ref 6–20)
CALCIUM SERPL-MCNC: 8.4 MG/DL (ref 8.6–10.2)
CHLORIDE BLD-SCNC: 106 MMOL/L (ref 98–107)
CO2: 23 MMOL/L (ref 22–29)
CREAT SERPL-MCNC: 1 MG/DL (ref 0.7–1.2)
GFR AFRICAN AMERICAN: >60
GFR NON-AFRICAN AMERICAN: >60 ML/MIN/1.73
GLUCOSE BLD-MCNC: 185 MG/DL (ref 74–99)
METER GLUCOSE: 114 MG/DL (ref 74–99)
METER GLUCOSE: 159 MG/DL (ref 74–99)
METER GLUCOSE: 188 MG/DL (ref 74–99)
METER GLUCOSE: 197 MG/DL (ref 74–99)
POTASSIUM SERPL-SCNC: 3.8 MMOL/L (ref 3.5–5)
SODIUM BLD-SCNC: 140 MMOL/L (ref 132–146)

## 2022-05-20 PROCEDURE — 73502 X-RAY EXAM HIP UNI 2-3 VIEWS: CPT

## 2022-05-20 PROCEDURE — 2709999900 HC NON-CHARGEABLE SUPPLY: Performed by: ORTHOPAEDIC SURGERY

## 2022-05-20 PROCEDURE — 27236 TREAT THIGH FRACTURE: CPT | Performed by: ORTHOPAEDIC SURGERY

## 2022-05-20 PROCEDURE — 6370000000 HC RX 637 (ALT 250 FOR IP): Performed by: STUDENT IN AN ORGANIZED HEALTH CARE EDUCATION/TRAINING PROGRAM

## 2022-05-20 PROCEDURE — 2060000000 HC ICU INTERMEDIATE R&B

## 2022-05-20 PROCEDURE — S5553 INSULIN LONG ACTING 5 U: HCPCS | Performed by: INTERNAL MEDICINE

## 2022-05-20 PROCEDURE — 51702 INSERT TEMP BLADDER CATH: CPT

## 2022-05-20 PROCEDURE — 36415 COLL VENOUS BLD VENIPUNCTURE: CPT

## 2022-05-20 PROCEDURE — 0QS604Z REPOSITION RIGHT UPPER FEMUR WITH INTERNAL FIXATION DEVICE, OPEN APPROACH: ICD-10-PCS | Performed by: ORTHOPAEDIC SURGERY

## 2022-05-20 PROCEDURE — 3E0333Z INTRODUCTION OF ANTI-INFLAMMATORY INTO PERIPHERAL VEIN, PERCUTANEOUS APPROACH: ICD-10-PCS | Performed by: INTERNAL MEDICINE

## 2022-05-20 PROCEDURE — 6370000000 HC RX 637 (ALT 250 FOR IP): Performed by: NURSE ANESTHETIST, CERTIFIED REGISTERED

## 2022-05-20 PROCEDURE — 3700000001 HC ADD 15 MINUTES (ANESTHESIA): Performed by: ORTHOPAEDIC SURGERY

## 2022-05-20 PROCEDURE — 3700000000 HC ANESTHESIA ATTENDED CARE: Performed by: ORTHOPAEDIC SURGERY

## 2022-05-20 PROCEDURE — 2500000003 HC RX 250 WO HCPCS

## 2022-05-20 PROCEDURE — S5553 INSULIN LONG ACTING 5 U: HCPCS | Performed by: STUDENT IN AN ORGANIZED HEALTH CARE EDUCATION/TRAINING PROGRAM

## 2022-05-20 PROCEDURE — 2580000003 HC RX 258

## 2022-05-20 PROCEDURE — 99254 IP/OBS CNSLTJ NEW/EST MOD 60: CPT | Performed by: ORTHOPAEDIC SURGERY

## 2022-05-20 PROCEDURE — 82962 GLUCOSE BLOOD TEST: CPT

## 2022-05-20 PROCEDURE — 3600000015 HC SURGERY LEVEL 5 ADDTL 15MIN: Performed by: ORTHOPAEDIC SURGERY

## 2022-05-20 PROCEDURE — 6360000002 HC RX W HCPCS: Performed by: INTERNAL MEDICINE

## 2022-05-20 PROCEDURE — 80048 BASIC METABOLIC PNL TOTAL CA: CPT

## 2022-05-20 PROCEDURE — 6370000000 HC RX 637 (ALT 250 FOR IP)

## 2022-05-20 PROCEDURE — 2720000010 HC SURG SUPPLY STERILE: Performed by: ORTHOPAEDIC SURGERY

## 2022-05-20 PROCEDURE — C1769 GUIDE WIRE: HCPCS | Performed by: ORTHOPAEDIC SURGERY

## 2022-05-20 PROCEDURE — 2580000003 HC RX 258: Performed by: STUDENT IN AN ORGANIZED HEALTH CARE EDUCATION/TRAINING PROGRAM

## 2022-05-20 PROCEDURE — 2580000003 HC RX 258: Performed by: NURSE PRACTITIONER

## 2022-05-20 PROCEDURE — 6360000002 HC RX W HCPCS: Performed by: STUDENT IN AN ORGANIZED HEALTH CARE EDUCATION/TRAINING PROGRAM

## 2022-05-20 PROCEDURE — 6360000002 HC RX W HCPCS

## 2022-05-20 PROCEDURE — 6370000000 HC RX 637 (ALT 250 FOR IP): Performed by: INTERNAL MEDICINE

## 2022-05-20 PROCEDURE — 6370000000 HC RX 637 (ALT 250 FOR IP): Performed by: NURSE PRACTITIONER

## 2022-05-20 PROCEDURE — 3209999900 FLUORO FOR SURGICAL PROCEDURES

## 2022-05-20 PROCEDURE — 3600000005 HC SURGERY LEVEL 5 BASE: Performed by: ORTHOPAEDIC SURGERY

## 2022-05-20 PROCEDURE — C1713 ANCHOR/SCREW BN/BN,TIS/BN: HCPCS | Performed by: ORTHOPAEDIC SURGERY

## 2022-05-20 DEVICE — BOLT BONE L90MM DIA10MM GLD TI ALLOY FOR FEM NK SYS: Type: IMPLANTABLE DEVICE | Site: FEMUR | Status: FUNCTIONAL

## 2022-05-20 DEVICE — SCREW BONE L40MM DIA5MM ST LCK W/ T25 STARDRV: Type: IMPLANTABLE DEVICE | Site: FEMUR | Status: FUNCTIONAL

## 2022-05-20 DEVICE — PLATE BONE 2 H 130DEG CCD ANG GLD TI ALLOY FOR FEM NK SYS: Type: IMPLANTABLE DEVICE | Site: FEMUR | Status: FUNCTIONAL

## 2022-05-20 DEVICE — SCREW BONE L95MM DIA6.4MM BLU TI ALLOY ANTIROTATION T25: Type: IMPLANTABLE DEVICE | Site: FEMUR | Status: FUNCTIONAL

## 2022-05-20 RX ORDER — ROCURONIUM BROMIDE 10 MG/ML
INJECTION, SOLUTION INTRAVENOUS PRN
Status: DISCONTINUED | OUTPATIENT
Start: 2022-05-20 | End: 2022-05-20 | Stop reason: SDUPTHER

## 2022-05-20 RX ORDER — ASPIRIN 325 MG
325 TABLET, DELAYED RELEASE (ENTERIC COATED) ORAL DAILY
Qty: 30 TABLET | Refills: 0 | Status: SHIPPED | OUTPATIENT
Start: 2022-05-20 | End: 2023-06-21

## 2022-05-20 RX ORDER — DEXAMETHASONE SODIUM PHOSPHATE 10 MG/ML
INJECTION INTRAMUSCULAR; INTRAVENOUS PRN
Status: DISCONTINUED | OUTPATIENT
Start: 2022-05-20 | End: 2022-05-20 | Stop reason: SDUPTHER

## 2022-05-20 RX ORDER — PHENYLEPHRINE HCL IN 0.9% NACL 1 MG/10 ML
SYRINGE (ML) INTRAVENOUS PRN
Status: DISCONTINUED | OUTPATIENT
Start: 2022-05-20 | End: 2022-05-20 | Stop reason: SDUPTHER

## 2022-05-20 RX ORDER — HYDROCODONE BITARTRATE AND ACETAMINOPHEN 5; 325 MG/1; MG/1
1 TABLET ORAL EVERY 4 HOURS PRN
Qty: 42 TABLET | Refills: 0 | Status: SHIPPED | OUTPATIENT
Start: 2022-05-20 | End: 2022-05-27

## 2022-05-20 RX ORDER — ALBUTEROL SULFATE 90 UG/1
AEROSOL, METERED RESPIRATORY (INHALATION) PRN
Status: DISCONTINUED | OUTPATIENT
Start: 2022-05-20 | End: 2022-05-20 | Stop reason: SDUPTHER

## 2022-05-20 RX ORDER — MIDAZOLAM HYDROCHLORIDE 1 MG/ML
INJECTION INTRAMUSCULAR; INTRAVENOUS PRN
Status: DISCONTINUED | OUTPATIENT
Start: 2022-05-20 | End: 2022-05-20 | Stop reason: SDUPTHER

## 2022-05-20 RX ORDER — PROPOFOL 10 MG/ML
INJECTION, EMULSION INTRAVENOUS PRN
Status: DISCONTINUED | OUTPATIENT
Start: 2022-05-20 | End: 2022-05-20 | Stop reason: SDUPTHER

## 2022-05-20 RX ORDER — FENTANYL CITRATE 50 UG/ML
INJECTION, SOLUTION INTRAMUSCULAR; INTRAVENOUS PRN
Status: DISCONTINUED | OUTPATIENT
Start: 2022-05-20 | End: 2022-05-20 | Stop reason: SDUPTHER

## 2022-05-20 RX ORDER — ONDANSETRON 2 MG/ML
INJECTION INTRAMUSCULAR; INTRAVENOUS PRN
Status: DISCONTINUED | OUTPATIENT
Start: 2022-05-20 | End: 2022-05-20 | Stop reason: SDUPTHER

## 2022-05-20 RX ORDER — SODIUM CHLORIDE 9 MG/ML
INJECTION, SOLUTION INTRAVENOUS CONTINUOUS PRN
Status: DISCONTINUED | OUTPATIENT
Start: 2022-05-20 | End: 2022-05-20 | Stop reason: SDUPTHER

## 2022-05-20 RX ORDER — CEFAZOLIN SODIUM 1 G/3ML
INJECTION, POWDER, FOR SOLUTION INTRAMUSCULAR; INTRAVENOUS PRN
Status: DISCONTINUED | OUTPATIENT
Start: 2022-05-20 | End: 2022-05-20 | Stop reason: SDUPTHER

## 2022-05-20 RX ADMIN — FENTANYL CITRATE 50 MCG: 50 INJECTION, SOLUTION INTRAMUSCULAR; INTRAVENOUS at 15:12

## 2022-05-20 RX ADMIN — PANTOPRAZOLE SODIUM 40 MG: 40 TABLET, DELAYED RELEASE ORAL at 05:55

## 2022-05-20 RX ADMIN — HEPARIN SODIUM 5000 UNITS: 10000 INJECTION INTRAVENOUS; SUBCUTANEOUS at 06:14

## 2022-05-20 RX ADMIN — Medication 50 MCG: at 15:26

## 2022-05-20 RX ADMIN — OXYCODONE HYDROCHLORIDE 10 MG: 10 TABLET ORAL at 13:29

## 2022-05-20 RX ADMIN — CEFAZOLIN SODIUM 1000 MG: 1 INJECTION, POWDER, FOR SOLUTION INTRAMUSCULAR; INTRAVENOUS at 22:54

## 2022-05-20 RX ADMIN — LEVETIRACETAM 500 MG: 500 TABLET, FILM COATED ORAL at 09:05

## 2022-05-20 RX ADMIN — ALBUTEROL SULFATE 3 PUFF: 90 AEROSOL, METERED RESPIRATORY (INHALATION) at 16:35

## 2022-05-20 RX ADMIN — INSULIN LISPRO 2 UNITS: 100 INJECTION, SOLUTION INTRAVENOUS; SUBCUTANEOUS at 22:08

## 2022-05-20 RX ADMIN — ATORVASTATIN CALCIUM 40 MG: 40 TABLET, FILM COATED ORAL at 21:47

## 2022-05-20 RX ADMIN — FENTANYL CITRATE 100 MCG: 50 INJECTION, SOLUTION INTRAMUSCULAR; INTRAVENOUS at 14:47

## 2022-05-20 RX ADMIN — ASPIRIN 325 MG: 325 TABLET, COATED ORAL at 22:34

## 2022-05-20 RX ADMIN — Medication 50 MCG: at 15:24

## 2022-05-20 RX ADMIN — CARVEDILOL 25 MG: 25 TABLET, FILM COATED ORAL at 09:05

## 2022-05-20 RX ADMIN — LIDOCAINE HYDROCHLORIDE 100 MG: 20 INJECTION, SOLUTION INTRAVENOUS at 14:47

## 2022-05-20 RX ADMIN — ROCURONIUM BROMIDE 30 MG: 10 SOLUTION INTRAVENOUS at 14:47

## 2022-05-20 RX ADMIN — ONDANSETRON 4 MG: 2 INJECTION INTRAMUSCULAR; INTRAVENOUS at 15:32

## 2022-05-20 RX ADMIN — SUGAMMADEX 163 MG: 100 INJECTION, SOLUTION INTRAVENOUS at 15:32

## 2022-05-20 RX ADMIN — SODIUM CHLORIDE: 9 INJECTION, SOLUTION INTRAVENOUS at 14:35

## 2022-05-20 RX ADMIN — DEXAMETHASONE SODIUM PHOSPHATE 10 MG: 10 INJECTION INTRAMUSCULAR; INTRAVENOUS at 15:02

## 2022-05-20 RX ADMIN — INSULIN LISPRO 2 UNITS: 100 INJECTION, SOLUTION INTRAVENOUS; SUBCUTANEOUS at 08:51

## 2022-05-20 RX ADMIN — SUGAMMADEX 163 MG: 100 INJECTION, SOLUTION INTRAVENOUS at 15:54

## 2022-05-20 RX ADMIN — CARVEDILOL 25 MG: 25 TABLET, FILM COATED ORAL at 18:30

## 2022-05-20 RX ADMIN — ALBUTEROL SULFATE 3 PUFF: 90 AEROSOL, METERED RESPIRATORY (INHALATION) at 16:37

## 2022-05-20 RX ADMIN — MIDAZOLAM 1 MG: 1 INJECTION INTRAMUSCULAR; INTRAVENOUS at 14:35

## 2022-05-20 RX ADMIN — ASPIRIN 81 MG: 81 TABLET, COATED ORAL at 09:05

## 2022-05-20 RX ADMIN — SODIUM CHLORIDE, PRESERVATIVE FREE 10 ML: 5 INJECTION INTRAVENOUS at 21:47

## 2022-05-20 RX ADMIN — ALBUTEROL SULFATE 2 PUFF: 90 AEROSOL, METERED RESPIRATORY (INHALATION) at 16:44

## 2022-05-20 RX ADMIN — AMLODIPINE BESYLATE 10 MG: 10 TABLET ORAL at 09:05

## 2022-05-20 RX ADMIN — LEVETIRACETAM 500 MG: 500 TABLET, FILM COATED ORAL at 21:47

## 2022-05-20 RX ADMIN — HEPARIN SODIUM 5000 UNITS: 10000 INJECTION INTRAVENOUS; SUBCUTANEOUS at 22:08

## 2022-05-20 RX ADMIN — Medication 100 MCG: at 15:29

## 2022-05-20 RX ADMIN — INSULIN GLARGINE-YFGN 12 UNITS: 100 INJECTION, SOLUTION SUBCUTANEOUS at 22:09

## 2022-05-20 RX ADMIN — OXYCODONE HYDROCHLORIDE 10 MG: 10 TABLET ORAL at 08:58

## 2022-05-20 RX ADMIN — CEFAZOLIN SODIUM 2000 MG: 1 INJECTION, POWDER, FOR SOLUTION INTRAMUSCULAR; INTRAVENOUS at 14:50

## 2022-05-20 RX ADMIN — SODIUM CHLORIDE, PRESERVATIVE FREE 10 ML: 5 INJECTION INTRAVENOUS at 09:11

## 2022-05-20 RX ADMIN — DOCUSATE SODIUM 100 MG: 100 CAPSULE, LIQUID FILLED ORAL at 21:47

## 2022-05-20 RX ADMIN — Medication 100 MCG: at 15:10

## 2022-05-20 RX ADMIN — INSULIN GLARGINE-YFGN 12 UNITS: 100 INJECTION, SOLUTION SUBCUTANEOUS at 08:50

## 2022-05-20 RX ADMIN — PROPOFOL 130 MG: 10 INJECTION, EMULSION INTRAVENOUS at 14:47

## 2022-05-20 NOTE — BRIEF OP NOTE
Brief Postoperative Note      Patient: Jessica Leavitt  YOB: 1970  MRN: [id number]    Date of Procedure: 5/20/2022    Pre-Op Diagnosis: FX RIGHT HIP    Post-Op Diagnosis: Same       Procedure(s):  HIP OPEN REDUCTION INTERNAL FIXATION    Surgeon(s):  Lenny Galan MD    Assistant:  Resident: Kelli Perez DO    Anesthesia: General    Estimated Blood Loss (mL): less than 161     Complications: None    Specimens:   * No specimens in log *    Implants:  Implant Name Type Inv. Item Serial No.  Lot No. LRB No. Used Action   PLATE BONE 2 H [id number] CCD ANG GLD TI ALLOY FOR FEM NK SYS - [id number]  PLATE BONE 2 H [id number] CCD ANG GLD TI ALLOY FOR FEM NK SYS  [employer] [id number] Right 1 Implanted   BOLT BONE L90MM [id number] GLD TI ALLOY FOR FEM NK SYS - [id number]  BOLT BONE L90MM [id number] GLD TI ALLOY FOR FEM NK SYS  [employer] [id number] Right 1 Implanted   SCREW BONE L95MM DIA6.4MM PUMA TI ALLOY ANTIROTATION T25 - [id number]  SCREW BONE L95MM DIA6.4MM PUMA TI ALLOY ANTIROTATION T25  [employer]- [id number] Right 1 Implanted   SCREW BONE L40MM DIA5MM ST LCK W/ T25 STARDRV - [id number]  SCREW BONE L40MM DIA5MM ST LCK W/ T25 STARDRV  [id number] Right 1 Implanted   SCREW BONE L40MM DIA5MM ST LCK W/ T25 STARDRV - [id number]  SCREW BONE L40MM DIA5MM ST LCK W/ T25 STARDRV  [employer] [id number] Right 1 Implanted         Drains:   Urinary Catheter (Active)   Catheter Indications Need for fluid volume management of the critically ill patient in a critical care setting;Perioperative use for selected surgical procedures 05/20/22 1424   Site Assessment Pink; No urethral drainage 05/20/22 1424   Urine Color Yellow 05/20/22 1424   Urine Appearance Clear 05/20/22 1424   Collection Container Standard 05/20/22 1424   Securement Method Securing device (Describe) 05/20/22 1424   Catheter Care Completed Yes 05/19/22 2000   Catheter Best Practices  Drainage tube clipped to bed;Catheter secured to thigh; Tamper seal intact; Bag below bladder;Bag not on floor; Lack of dependent loop in tubing;Drainage bag less than half full 05/20/22 1424   Status Draining 05/20/22 1424   Output (mL) 350 mL 05/19/22 1439       Findings: see op note      Electronically signed by Kaz William DO on 5/20/2022 at 3:53 PM

## 2022-05-20 NOTE — CARE COORDINATION
5/20 Update CM note. Patient remains in droplet plus isolation for COVID. Cardio has no plans for pre-op testing and states patient is an intermediate risk but can proceed with ORIF. ID has patient off ATB at this time. PT/OT to evaluate when appropriate. Unsure of date for ORIF as no new documentation is noted from ortho today. Plan remains to return to [facility] when medically stable. Patient is a long term bed hold. KIAH completed and ambulance paperwork in soft chart.     JENNI CampbellN, RN  [facility] SURGICAL [facility] Case Management   Cell: [phone]

## 2022-05-20 NOTE — PROGRESS NOTES
[facility] PROGRESS NOTE      Chief complaint: Follow-up of fever    The patient is a 45 y.o. male with history of left MCA stroke causing aphasia, DM, hypertension, presented on 05/18 with right hip pain found to have closed right femoral neck fracture. On admission, he had fever of 102.8 °F, hypoxemia requiring 3-4 Lpm oxygen by nasal cannula, no leukocytosis. Respiratory pathogen PCR panel was positive for SARS-CoV-2 PCR. Chest x-ray was unremarkable. Urinalysis showed no pyuria. CT abdomen and pelvis was unremarkable except for high suspicion for right femoral neck fracture. CT of the neck soft tissue showed stranding changes within the posterior subcutaneous tissues of the neck in the midline likely representing old inflammation/infection, no abscess collection. Blood cultures showed no growth to date. Piperacillin-tazobactam was started on 05/19. He underwent right femoral neck fracture ORIF on 05/20. Subjective: Patient was seen and examined. No chills, no abdominal pain, no diarrhea, no rash, no itching. Objective:    Vitals:    05/20/22 1415   BP: (!) 160/98   Pulse: 82   Resp: 16   Temp: 97.7 °F (36.5 °C)   SpO2:      Constitutional: Alert, not in distress  Respiratory: Clear breath sounds, no crackles, no wheezes  Cardiovascular: Regular rate and rhythm, no murmurs  Gastrointestinal: Bowel sounds present, soft, nontender  Skin: Warm and dry, no active dermatoses  Musculoskeletal: No joint swelling, no joint erythema    Labs, imaging, and medical records/notes were personally reviewed. Assessment:  Fever, resolved, suspect associated with COVID-19  Acute hypoxic respiratory failure  Right femoral neck fracture, s/p ORIF on 05/20    Recommendations:  Monitor clinically off antibiotics for now except for perioperative antibiotic prophylaxis as per standard protocol. Follow up blood cultures. Monitor respiratory status. Wean off oxygen as tolerated. Continue supportive care.     Thank you for involving me in the care of Ararvind Bandardaisha. I will sign off for now. Please do not hesitate to call for any questions, concerns, or new findings.       Electronically signed by Nia Ward MD on 5/20/2022 at 9:42 AM

## 2022-05-20 NOTE — PROGRESS NOTES
Physical Therapy      Name: Roger Cabrales  : 1970  MRN: [id number]  Date of Service: 2022  Room #:  9928/7400-L    PT held - surgical intervention planned. PT will follow up as appropriate.     Maite Barton, PT, DPT  [id number]

## 2022-05-20 NOTE — PROGRESS NOTES
Hospitalist Progress Note      PCP: Rita Luna MD    Date of Admission: 5/18/2022        Hospital Course:  **45 y.o. male presented with right hip pain, found to have a closed right femoral neck fracture , he resides in a facility , and is non verbal but understands when asked questions. He was also found to be COVID POS  AND UPON PRESENTATION HE HAD A TEMP .8* FOR or TODAY        Subjective: HAVING PAIN*           Medications:  Reviewed    Infusion Medications    sodium chloride      dextrose       Scheduled Medications    sodium chloride flush  5-40 mL IntraVENous 2 times per day    amLODIPine  10 mg Oral Daily    aspirin  81 mg Oral Daily    atorvastatin  40 mg Oral Nightly    carvedilol  25 mg Oral BID WC    levETIRAcetam  500 mg Oral BID    insulin lispro  0-10 Units SubCUTAneous 4x Daily AC & HS    docusate sodium  100 mg Oral Nightly    pantoprazole  40 mg Oral QAM AC    insulin glargine  12 Units SubCUTAneous BID    heparin (porcine)  5,000 Units SubCUTAneous Q8H     PRN Meds: sodium chloride flush, sodium chloride, acetaminophen **OR** acetaminophen, bisacodyl, trimethobenzamide, glucose, glucagon (rDNA), dextrose, morphine, oxyCODONE **OR** oxyCODONE, ondansetron, dextrose      Intake/Output Summary (Last 24 hours) at 5/20/2022 1650  Last data filed at 5/20/2022 1607  Gross per 24 hour   Intake 510 ml   Output 100 ml   Net 410 ml       Exam:    BP (!) 160/98   Pulse 82   Temp 97.7 °F (36.5 °C) (Temporal)   Resp 16   Ht 5' 10\" (1.778 m)   Wt 179 lb 14.3 oz (81.6 kg)   SpO2 95%   BMI 25.81 kg/m²       General appearance:  No apparent distress,   HEENT:  Normal cephalic, atraumatic without obvious deformity. Neck: Supple, with full range of motion. No jugular venous distention. Trachea midline. Respiratory:  Normal respiratory effort.  Clear to auscultation,Cardiovascular:  Regular rate and rhythm   Abdomen: Soft, non-tender, non-distended with normal bowel sounds. Musculoskeletal:  No clubbing, cyanosis or edema bilaterally. Skin: Skin color, texture, turgor normal.  No rashes or lesions. Neurologic:  He has paralysis of his RUE AND RLE,  External rotation noted  Psychiatric:  Alert and oriented,             Labs:   Recent Labs     05/18/22 2109 05/19/22 0109 05/19/22 0618   WBC 11.8* 9.7 10.6   HGB 13.8 13.5 14.1   HCT 40.9 40.7 42.7    181 196     Recent Labs     05/19/22 0109 05/19/22  0618 05/20/22  0707    138 140   K 4.2 4.0 3.8    105 106   CO2 21* 21* 23   BUN 12 13 17   CREATININE 0.9 1.1 1.0   CALCIUM 7.6* 8.4* 8.4*     Recent Labs     05/18/22 2109 05/19/22 0109   AST 14 11   ALT 19 15   BILITOT 0.4 0.5   ALKPHOS 113 95     Recent Labs     05/19/22 0109   INR 1.4     No results for input(s): Les Karey in the last 72 hours. Recent Labs     05/18/22 2109 05/19/22  0109   AST 14 11   ALT 19 15   BILITOT 0.4 0.5   ALKPHOS 113 95     No results for input(s): LACTA in the last 72 hours. No results found for: Jeannette Gun  No results found for: AMMONIA    Assessment:    Active Hospital Problems    Diagnosis Date Noted    [facility] right [facility] [S72.001A] 05/19/2022     Priority: Medium    History of CVA (cerebrovascular accident) [Z86.73] 05/19/2022     Priority: Medium    History of seizures [Z87.898] 05/19/2022     Priority: Medium    Primary hypertension [I10] 05/19/2022     Priority: Medium    Type 2 diabetes mellitus ([facility] [E11.9] 05/19/2022     Priority: Medium    Sepsis ([facility] [A41.9] 05/19/2022     Priority: Medium    Fall [W19. XXXA] 05/19/2022     Priority: Medium   HLD  SEIZURE DISORDER      PLAN:  WILL GET CARDIAC CLEARANCE( CLEARED)  SSI  HEPARIN FOR DVE PROPHYLAXIS FOR NOW   LIPITOR  NORVASC           DVT Prophylaxis: *HEPARIN  Diet: Diet NPO  Code Status: Full Code     PT/OT Eval Status:   WHEN STABLE     Dispo - *AMANDA       Electronically signed by Radha Rodriguez DO on 5/20/2022 at 4:50 PM [facility]

## 2022-05-20 NOTE — OP NOTE
Operative Note      Patient: Lashae Manriquez  YOB: 1970  MRN: [id number]    Date of Procedure: 5/20/2022    Pre-Op Diagnosis: Right femoral neck fracture    Post-Op Diagnosis: Same         Procedure:  Open reduction internal fixation right femoral neck fracture with femoral neck system        Surgeon(s):  Jaquelin Callejas MD    Assistant:   Resident: Kaz William DO    Anesthesia: General    Estimated Blood Loss (mL): less than 50     Complications: None    Specimens:   * No specimens in log *    Implants:  Implant Name Type Inv. Item Serial No.  Lot No. LRB No. Used Action   PLATE BONE 2 H [id number] CCD ANG GLD TI ALLOY FOR FEM NK SYS - [id number]  PLATE BONE 2 H [id number] CCD ANG GLD TI ALLOY FOR FEM NK SYS  [employer]- [id number] Right 1 Implanted   BOLT BONE L90MM [id number] GLD TI ALLOY FOR FEM NK SYS - [id number]  BOLT BONE L90MM [id number] GLD TI ALLOY FOR FEM NK SYS  [employer] [id number] Right 1 Implanted   SCREW BONE L95MM DIA6.4MM PUMA TI ALLOY ANTIROTATION T25 - [id number]  SCREW BONE L95MM DIA6.4MM PUMA TI ALLOY ANTIROTATION T25  [employer] [id number] Right 1 Implanted   SCREW BONE L40MM DIA5MM ST LCK W/ T25 STARDRV - [id number]  SCREW BONE L40MM DIA5MM ST LCK W/ T25 STARDRV  [facility] [id number] Right 1 Implanted   SCREW BONE L40MM DIA5MM ST LCK W/ T25 STARDRV - [id number]  SCREW BONE L40MM DIA5MM ST LCK W/ T25 STARDRV  [facility] [id number] Right 1 Implanted         Drains:   Urinary Catheter (Active)   Catheter Indications Need for fluid volume management of the critically ill patient in a critical care setting;Perioperative use for selected surgical procedures 05/20/22 1424   Site Assessment Pink; No urethral drainage 05/20/22 1424   Urine Color Yellow 05/20/22 1424   Urine Appearance Clear 05/20/22 1424   Collection Container Standard 05/20/22 1424   Securement Method Securing device (Describe) 05/20/22 1424 Catheter Care Completed Yes 05/19/22 2000   Catheter Best Practices  Drainage tube clipped to bed;Catheter secured to thigh; Tamper seal intact; Bag below bladder;Bag not on floor; Lack of dependent loop in tubing;Drainage bag less than half full 05/20/22 1424   Status Draining 05/20/22 1424   Output (mL) 350 mL 05/19/22 1439       Findings: Used Synthes femoral neck system with a 90 mm femoral neck system with a 95 mm derotational screw and 240 mm locking screws in the shaft    Detailed Description of Procedure:   Patient was brought to the operating room in a supine position in the [facility] room. It took a significant amount of time to position him due to the [facility] room. After he was positioned on the [place] table with his feet padded and then traction boots a timeout was performed indicating the appropriate identification of the patient, the procedure to be performed, and the side to be performed upon. This was agreed upon by individuals in the room. After the timeout fluoroscopy was brought in position we are able to anatomically reduce his right femoral neck. This point time he was sterilely prepped and draped in standard orthopedic fashion. The timeout was repeated and agreed upon once again. Proxy was brought to the position to carter landmarks. A lateral approach to the proximal femur was then created. 10 blade taken through skin. Dissection was carried down to the iliotibial band which was incised line of this fibers. We then made an incision in the posterior aspect of the vastus lateralis fascia. This was then lifted anteriorly with a Pike retractor. We then used a Howell to identify bone laterally. Fluoroscopy was brought in position. We used the guide for the guidepin into the femoral head. Once it was in appropriate position we then utilized the entry reamer after measuring the length of the screw to be around 90 mm. We then impacted the femoral neck system down to bone laterally.   A 95 mm derotational screw was then placed. 2 additional 40 mm locking screws were placed in the shaft. We then compressed the fracture under fluoroscopic guidance. All jigs were removed final x-rays showed good positioning of hardware with fracture anatomically reduced. Patient's incision then copiously irrigated with sterile normal saline. The fascia and iliotibial band were closed in watertight fashion with 0 Vicryl. Subtenons tissue with 2-0 Monocryl. Skin was closed with staples. Patient had sterile dressings put in position his compartments are soft compressible. He was then transferred back to the hospital bed by multiple individuals in safe fashion and taken back to his room due to COVID status after recovering in the operating room. Postoperative plan:  Weight-bear as tolerated right lower extremity    DVT prophylaxis with oral aspirin    Follow-up in office in 2 weeks for x-rays of the right hip, staple removal, and continuing physical therapy.     Electronically signed by Marlen Avery MD on 5/20/2022 at 3:40 PM

## 2022-05-21 LAB
METER GLUCOSE: 223 MG/DL (ref 74–99)
METER GLUCOSE: 275 MG/DL (ref 74–99)
METER GLUCOSE: 283 MG/DL (ref 74–99)
METER GLUCOSE: 307 MG/DL (ref 74–99)

## 2022-05-21 PROCEDURE — 97530 THERAPEUTIC ACTIVITIES: CPT

## 2022-05-21 PROCEDURE — 2060000000 HC ICU INTERMEDIATE R&B

## 2022-05-21 PROCEDURE — 97535 SELF CARE MNGMENT TRAINING: CPT

## 2022-05-21 PROCEDURE — 97161 PT EVAL LOW COMPLEX 20 MIN: CPT

## 2022-05-21 PROCEDURE — 6370000000 HC RX 637 (ALT 250 FOR IP): Performed by: STUDENT IN AN ORGANIZED HEALTH CARE EDUCATION/TRAINING PROGRAM

## 2022-05-21 PROCEDURE — 2700000000 HC OXYGEN THERAPY PER DAY

## 2022-05-21 PROCEDURE — 6360000002 HC RX W HCPCS: Performed by: STUDENT IN AN ORGANIZED HEALTH CARE EDUCATION/TRAINING PROGRAM

## 2022-05-21 PROCEDURE — 51702 INSERT TEMP BLADDER CATH: CPT

## 2022-05-21 PROCEDURE — 97166 OT EVAL MOD COMPLEX 45 MIN: CPT

## 2022-05-21 PROCEDURE — 2580000003 HC RX 258: Performed by: STUDENT IN AN ORGANIZED HEALTH CARE EDUCATION/TRAINING PROGRAM

## 2022-05-21 PROCEDURE — 82962 GLUCOSE BLOOD TEST: CPT

## 2022-05-21 PROCEDURE — S5553 INSULIN LONG ACTING 5 U: HCPCS | Performed by: STUDENT IN AN ORGANIZED HEALTH CARE EDUCATION/TRAINING PROGRAM

## 2022-05-21 RX ADMIN — SODIUM CHLORIDE, PRESERVATIVE FREE 10 ML: 5 INJECTION INTRAVENOUS at 22:08

## 2022-05-21 RX ADMIN — OXYCODONE HYDROCHLORIDE 10 MG: 10 TABLET ORAL at 22:07

## 2022-05-21 RX ADMIN — INSULIN GLARGINE-YFGN 12 UNITS: 100 INJECTION, SOLUTION SUBCUTANEOUS at 22:15

## 2022-05-21 RX ADMIN — AMLODIPINE BESYLATE 10 MG: 10 TABLET ORAL at 09:19

## 2022-05-21 RX ADMIN — PANTOPRAZOLE SODIUM 40 MG: 40 TABLET, DELAYED RELEASE ORAL at 06:26

## 2022-05-21 RX ADMIN — HEPARIN SODIUM 5000 UNITS: 10000 INJECTION INTRAVENOUS; SUBCUTANEOUS at 18:35

## 2022-05-21 RX ADMIN — CEFAZOLIN SODIUM 1000 MG: 1 INJECTION, POWDER, FOR SOLUTION INTRAMUSCULAR; INTRAVENOUS at 06:30

## 2022-05-21 RX ADMIN — INSULIN LISPRO 8 UNITS: 100 INJECTION, SOLUTION INTRAVENOUS; SUBCUTANEOUS at 12:08

## 2022-05-21 RX ADMIN — CARVEDILOL 25 MG: 25 TABLET, FILM COATED ORAL at 18:33

## 2022-05-21 RX ADMIN — SODIUM CHLORIDE, PRESERVATIVE FREE 10 ML: 5 INJECTION INTRAVENOUS at 12:09

## 2022-05-21 RX ADMIN — LEVETIRACETAM 500 MG: 500 TABLET, FILM COATED ORAL at 22:07

## 2022-05-21 RX ADMIN — ATORVASTATIN CALCIUM 40 MG: 40 TABLET, FILM COATED ORAL at 22:07

## 2022-05-21 RX ADMIN — INSULIN LISPRO 6 UNITS: 100 INJECTION, SOLUTION INTRAVENOUS; SUBCUTANEOUS at 18:32

## 2022-05-21 RX ADMIN — OXYCODONE HYDROCHLORIDE 10 MG: 10 TABLET ORAL at 09:19

## 2022-05-21 RX ADMIN — OXYCODONE HYDROCHLORIDE 10 MG: 10 TABLET ORAL at 02:36

## 2022-05-21 RX ADMIN — HEPARIN SODIUM 5000 UNITS: 10000 INJECTION INTRAVENOUS; SUBCUTANEOUS at 06:19

## 2022-05-21 RX ADMIN — INSULIN LISPRO 6 UNITS: 100 INJECTION, SOLUTION INTRAVENOUS; SUBCUTANEOUS at 22:15

## 2022-05-21 RX ADMIN — HEPARIN SODIUM 5000 UNITS: 10000 INJECTION INTRAVENOUS; SUBCUTANEOUS at 22:15

## 2022-05-21 RX ADMIN — ASPIRIN 325 MG: 325 TABLET, COATED ORAL at 09:19

## 2022-05-21 RX ADMIN — CEFAZOLIN SODIUM 1000 MG: 1 INJECTION, POWDER, FOR SOLUTION INTRAMUSCULAR; INTRAVENOUS at 18:36

## 2022-05-21 RX ADMIN — LEVETIRACETAM 500 MG: 500 TABLET, FILM COATED ORAL at 10:00

## 2022-05-21 RX ADMIN — DOCUSATE SODIUM 100 MG: 100 CAPSULE, LIQUID FILLED ORAL at 22:07

## 2022-05-21 RX ADMIN — INSULIN LISPRO 4 UNITS: 100 INJECTION, SOLUTION INTRAVENOUS; SUBCUTANEOUS at 06:26

## 2022-05-21 RX ADMIN — CARVEDILOL 25 MG: 25 TABLET, FILM COATED ORAL at 09:19

## 2022-05-21 RX ADMIN — INSULIN GLARGINE-YFGN 12 UNITS: 100 INJECTION, SOLUTION SUBCUTANEOUS at 09:20

## 2022-05-21 ASSESSMENT — PAIN SCALES - WONG BAKER
WONGBAKER_NUMERICALRESPONSE: 2
WONGBAKER_NUMERICALRESPONSE: 8
WONGBAKER_NUMERICALRESPONSE: 8

## 2022-05-21 ASSESSMENT — PAIN DESCRIPTION - ORIENTATION
ORIENTATION: RIGHT

## 2022-05-21 ASSESSMENT — PAIN SCALES - GENERAL
PAINLEVEL_OUTOF10: 8
PAINLEVEL_OUTOF10: 4
PAINLEVEL_OUTOF10: 6
PAINLEVEL_OUTOF10: 10

## 2022-05-21 ASSESSMENT — PAIN DESCRIPTION - LOCATION
LOCATION: HIP

## 2022-05-21 NOTE — PROGRESS NOTES
Department of Orthopedic Surgery  Resident Progress Note      SUBJECTIVE  Pt seen and examined. He is nonverbal. He gestures indicating some weakness in the operative extremity. Patient reports 4/10. He denies fevers, chills, nausea, vomiting chest pain and shortness of breath.      OBJECTIVE    Physical    VITALS:  /86   Pulse 73   Temp 97.9 °F (36.6 °C) (Temporal)   Resp 18   Ht 5' 10\" (1.778 m)   Wt 179 lb 14.3 oz (81.6 kg)   SpO2 97%   BMI 25.81 kg/m²     right LE:   · Dressing C/D/I  · Patient nods to distal sensory: sural, saphenous, tibial, superficial and deep peroneal nerves  · +2/4 PT and DP  · +PF/DF/EHL  · Compartments soft and compressible    Data    CBC:   Lab Results   Component Value Date    WBC 10.6 05/19/2022    RBC 5.25 05/19/2022    HGB 14.1 05/19/2022    HCT 42.7 05/19/2022    MCV 81.3 05/19/2022    MCH 26.9 05/19/2022    MCHC 33.0 05/19/2022    RDW 12.9 05/19/2022     05/19/2022    MPV 9.9 05/19/2022     PT/INR:    Lab Results   Component Value Date    PROTIME 15.9 05/19/2022    INR 1.4 05/19/2022         ASSESSMENT   · POD 1 s/p ORIF right femoral neck 5/20/2022    PLAN    · Weight bearing as tolerated RLE  · 24 hour abx coverage  · Deep venous thrombosis prophylaxis - ASA, early mobilization  · PT/OT when able  · Pain Control: IV and PO  · Monitor H&H: pending am lab  · D/C Plan:  pending

## 2022-05-21 NOTE — PROGRESS NOTES
Physical Therapy Initial Assessment     Name: Shadi Maldonado  : 1970  MRN: [id number]      Date of Service: 2022    Evaluating PT:  Joslyn Mathew, PT, DPT [id number]    Room #:  7949/1208-A  Diagnosis:  Closed displaced fracture of right femoral neck ([facility] [S72.001A]  PMHx/PSHx:    Past Medical History:   Diagnosis Date    Cerebral artery occlusion with cerebral infarction ([facility]     Diabetes mellitus ([facility]     Hypertension     Seizures ([facility]      Procedure/Surgery:  ORIF R femoral neck ()  Reason for admission: R femoral neck fracture (fall)  Precautions: COVID + (droplet + precautions), R sided weakness (hx L MCA stroke), non-verbal/aphasic (sign language), FWB  Equipment Needs:  FWW    SUBJECTIVE:  Pt admitted from facility where he is long term resident. Pt unable to answer questions during subjective regarding PLOF so PLOF unknown. OBJECTIVE:   Initial Evaluation  Date: 22 Treatment Short Term/ Long Term   Goals   AM-PAC 6 Clicks      Was pt agreeable to Eval/treatment? Yes     Does pt have pain? Yes, unable to quantify but gestures to RLE when asked about pain      Bed Mobility  Rolling: NT  Supine to sit: mod a  Sit to supine: min a  Scooting: mod a  Rolling: ind  Supine to sit: ind  Sit to supine: ind  Scooting: ind   Transfers Sit to stand: Max a x2 attempted, 50% stand height  Stand to sit: max a x2  Stand pivot: NT  Sit to stand: min a  Stand to sit: min a  Stand pivot: min a FWW   Ambulation    unable to attempt  50 feet with FWW min a   Stair negotiation: ascended and descended  unable to attempt   TBD   ROM BUE:  Refer to OT eval   BLE:  WNL     Strength BUE:  Refer to OT eval   BLE:  Weakness on R (from previous stroke), unable to fuly formally assess as pt with difficulty understanding commands  4+/5 globally    Balance Sitting EOB:  SBA  Dynamic Standing:  NT  Sitting EOB:  ind  Dynamic Standing:  Min a FWW     Pt is A & O x ??  Pt unable to answer orientation questions, showing frustration d/t aphasia   Sensation:  Pt denies numbness and tingling to extremities  Edema:  None noted     Vitals:  Blood Pressure at rest -- Blood Pressure post session --   Heart Rate at rest -- Heart Rate post session --   SPO2 at rest 95% SPO2 post session 93%     Therapeutic Exercises:  none performed this visit    Patient education  Pt educated on WB precautions, surgery, transfer techniques, safety, role of PT    Patient response to education:   Pt verbalized understanding Pt demonstrated skill Pt requires further education in this area    x x     ASSESSMENT:  Conditions Requiring Skilled Therapeutic Intervention:  [x]Decreased strength     []Decreased ROM  [x]Decreased functional mobility  [x]Decreased balance   [x]Decreased endurance   [x]Decreased posture  []Decreased sensation  []Decreased coordination   []Decreased vision  [x]Decreased safety awareness   [x]Increased pain     Comments:  Pt in bed on arrival, agreeable to PT evaluation and able to answer some yes/no questions. Pt with poor safety awareness regarding transfer techniques, often requiring cuing to correct to prevent pain. Pt utilizes LLE to assist RLE to transfer to EOB, requiring mod A from therapist used as anchor to pull to sitting. Pt  Noted some dizziness sitting EOB. Pt on 1 L O2 throughout session, SpO2 dropped to 89% sitting EOB and increased to 93% in 2 min sitting. STS attempted at EOB to [facility] with max cuing, heavy assist and only partial stand achieved. Pt then returned to bed, lifting RLE off of bed too high and causing pain. Pt educated on different technique to utilize to decrease pain with transfer. Pt required frequently repeated commands/cues throughout session to promote safety. Pt left in bed at end of session with all needs met and call light in reach.      Treatment:  Patient practiced and was instructed in the following treatment:     Bed mobility: performed with cues for safety awareness and proper hand placement to promote improved functional independence.  Transfer Training: STS attempted at EOB, 50% stand height achieved     Pt's/ family goals   1. Return home safely. Prognosis is fair for reaching above PT goals. Patient and or family understand(s) diagnosis, prognosis, and plan of care. yes    PHYSICAL THERAPY PLAN OF CARE:    PT POC is established based on physician order and patient diagnosis     Referring provider/PT Order:     PT eval and treat  Start:  05/19/22 0545,   End:  05/19/22 0545,   ONE TIME,   Standing Count:  1 Occurrences,   R        Last continued at transfer on Fri May 20, 2022  6:04 PM    Marcelino Pineda DO       Diagnosis:  Closed displaced fracture of right femoral neck ([facility] [S72.001A]  Specific instructions for next treatment:  Trial stand, transfers     Current Treatment Recommendations:     [x] Strengthening to improve independence with functional mobility   [x] ROM to improve independence with functional mobility   [x] Balance Training to improve static/dynamic balance and to reduce fall risk  [x] Endurance Training to improve activity tolerance during functional mobility   [x] Transfer Training to improve safety and independence with all functional transfers   [x] Gait Training to improve gait mechanics, endurance and assess need for appropriate assistive device  [x] Stair Training in preparation for safe discharge home and/or into the community   [x] Positioning to prevent skin breakdown and contractures  [x] Safety and Education Training   [x] Patient/Caregiver Education   [] HEP  [] Other     PT long term treatment goals are located in above grid    Frequency of treatments: 2-5x/week x 1-2 weeks.     Time in 1411  Time out  1453    Total Treatment Time  25 minutes     Evaluation Time includes thorough review of current medical information, gathering information on past medical history/social history and prior level of function, completion of standardized testing/informal observation of tasks, assessment of data and education on plan of care and goals.     CPT codes:  [x] Low Complexity PT evaluation 10798  [] Moderate Complexity PT evaluation 26673  [] High Complexity PT evaluation 12783  [] PT Re-evaluation 23478  [] Gait training 47321 -- minutes  [] Manual therapy [id number] -- minutes  [x] Therapeutic activities 51663 25 minutes  [] Therapeutic exercises 19545 -- minutes  [] Neuromuscular reeducation 42333 -- minutes     Alexander Johnson, PT, DPT  [id number]

## 2022-05-21 NOTE — PROGRESS NOTES
Hospitalist Progress Note      PCP: Denise Jenkins MD    Date of Admission: 5/18/2022        Hospital Course:  *45 y. o. male presented with right hip pain, found to have a closed right femoral neck fracture , he resides in a facility , and is non verbal but understands when asked questions.   He was also found to be COVID POS  AND UPON PRESENTATION HE HAD A TEMP .8* or ON MAY 20**        Subjective: *RIGHT HIP PAIN          Medications:  Reviewed    Infusion Medications    sodium chloride      dextrose       Scheduled Medications    ceFAZolin  1,000 mg IntraVENous Q8H    aspirin  325 mg Oral Daily    sodium chloride flush  5-40 mL IntraVENous 2 times per day    amLODIPine  10 mg Oral Daily    atorvastatin  40 mg Oral Nightly    carvedilol  25 mg Oral BID WC    levETIRAcetam  500 mg Oral BID    insulin lispro  0-10 Units SubCUTAneous 4x Daily AC & HS    docusate sodium  100 mg Oral Nightly    pantoprazole  40 mg Oral QAM AC    insulin glargine  12 Units SubCUTAneous BID    heparin (porcine)  5,000 Units SubCUTAneous Q8H     PRN Meds: sodium chloride flush, sodium chloride, acetaminophen **OR** acetaminophen, bisacodyl, trimethobenzamide, glucose, glucagon (rDNA), dextrose, morphine, oxyCODONE **OR** oxyCODONE, ondansetron, dextrose      Intake/Output Summary (Last 24 hours) at 5/21/2022 1801  Last data filed at 5/21/2022 1528  Gross per 24 hour   Intake --   Output 500 ml   Net -500 ml       Exam:    /61   Pulse 78   Temp 97.5 °F (36.4 °C) (Temporal)   Resp 18   Ht 5' 10\" (1.778 m)   Wt 179 lb 14.3 oz (81.6 kg)   SpO2 93%   BMI 25.81 kg/m²       General appearance:  No apparent distress,   HEENT:  Normal cephalic, atraumatic without obvious deformity. Neck: Supple, with full range of motion. No jugular venous distention. Trachea midline. Respiratory:  Normal respiratory effort.  Clear to auscultation,Cardiovascular:  Regular rate and rhythm   Abdomen: Soft, non-tender, non-distended with normal bowel sounds. Musculoskeletal:  No clubbing, cyanosis or edema bilaterally.    Skin: Skin color, texture, turgor normal.  No rashes or lesions. Neurologic:  He has paralysis of his RUE AND RLE,  External rotation noted  Psychiatric:  Alert and oriented,                      Labs:   Recent Labs     05/18/22 2109 05/19/22 0109 05/19/22 0618   WBC 11.8* 9.7 10.6   HGB 13.8 13.5 14.1   HCT 40.9 40.7 42.7    181 196     Recent Labs     05/19/22 0109 05/19/22  0618 05/20/22  0707    138 140   K 4.2 4.0 3.8    105 106   CO2 21* 21* 23   BUN 12 13 17   CREATININE 0.9 1.1 1.0   CALCIUM 7.6* 8.4* 8.4*     Recent Labs     05/18/22 2109 05/19/22  0109   AST 14 11   ALT 19 15   BILITOT 0.4 0.5   ALKPHOS 113 95     Recent Labs     05/19/22  0109   INR 1.4     No results for input(s): Jaret Cleveland in the last 72 hours. Recent Labs     05/18/22 2109 05/19/22  0109   AST 14 11   ALT 19 15   BILITOT 0.4 0.5   ALKPHOS 113 95     No results for input(s): LACTA in the last 72 hours. No results found for: Belinda Kast  No results found for: AMMONIA    Assessment:    Active Hospital Problems    Diagnosis Date Noted    Closed fracture of neck of right femur ([facility] [S72.001A] 05/19/2022     Priority: Medium    History of CVA (cerebrovascular accident) [Z86.73] 05/19/2022     Priority: Medium    History of seizures [Z87.898] 05/19/2022     Priority: Medium    Primary hypertension [I10] 05/19/2022     Priority: Medium    Type 2 diabetes mellitus ([facility] [E11.9] 05/19/2022     Priority: Medium    Sepsis ([facility] [A41.9] 05/19/2022     Priority: Medium    Fall [W19. XXXA] 05/19/2022     Priority: Medium         Priority: Medium   HLD  SEIZURE DISORDER      PLAN:  WILL GET CARDIAC CLEARANCE( CLEARED)  SSI  HEPARIN FOR DVE PROPHYLAXIS FOR NOW   LIPITOR  NORVASC           DVT Prophylaxis: *HEPARIN  Diet: Diet NPO  Code Status: Full Code     PT/OT Eval Status:  WHEN STABLE     Dispo - *AMANDA         Electronically signed by Farrah Junior DO on 5/21/2022 at [place] [id number]

## 2022-05-21 NOTE — PROGRESS NOTES
[address] [place] [address] CARE [place], [address] [facility] [address]                                                  Patient Name: Willian Yao    MRN: [id number]    : 1970    Room: [address] [place] [address]      Evaluating OT: Jennette Meigs, [address] Jade Cisse OTR/L; [id number]      Referring Provider: Precious Guo DO    Specific Provider Orders/Date: OT Eval and Treat 2022      Diagnosis: Closed fracture of neck of right femur     Surgery: Open reduction internal fixation R femoral neck fracture 2022     Pertinent Medical History:  has a past medical history of Cerebral artery occlusion with cerebral infarction ([facility] Diabetes mellitus ([facility] Hypertension, and Seizures ([facility]      Reason for Admission: unwitnessed fall at SNF found lying on the ground by his bed    Recommended Adaptive Equipment:  TBD pending discharge/progression     Precautions:  DROPLET PRECAUTIONS (COVID), Fall Risk, Hx of CVA (R sided hemiplegia - wrist flexion contracture), R femur fracture (WBAT RLE), aphasia d/t previous CVA (non-verbal/able to answer yes/no questions - inconsistent)     Assessment of current deficits:    [x] Functional mobility  [x]ADLs  [x] Strength               [x]Cognition    [x] Functional transfers   [x] IADLs         [x] Safety Awareness   [x]Endurance    [x] Fine Coordination              [x] Balance      [] Vision/perception   []Sensation     []Gross Motor Coordination  [] ROM  [] Delirium                   [] Motor Control     OT PLAN OF CARE   OT POC based on physician orders, patient diagnosis and results of clinical assessment    Frequency/Duration: 1-3 days/wk for 2 weeks PRN   Specific OT Treatment Interventions to include:   * Instruction/training on adapted ADL techniques and AE recommendations to increase functional independence within precautions       * Training on energy conservation strategies, correct breathing pattern and techniques to improve independence/tolerance for self-care routine  * Functional transfer/mobility training/DME recommendations for increased independence, safety, and fall prevention  * Patient/Family education to increase follow through with safety techniques and functional independence  * Recommendation of environmental modifications for increased safety with functional transfers/mobility and ADLs  * Therapeutic exercise to improve motor endurance, ROM, and functional strength for ADLs/functional transfers  * Therapeutic activities to facilitate/challenge dynamic balance, stand tolerance for increased safety and independence with ADLs    Home Living: Poor historian d/t aphasia. Per chart admitted from [facility] where he is a long term bed hold.   Bathroom setup: handicap accessible   Equipment owned: alfred, w/c    Prior Level of Function: assist from staff with ADLs and with IADLs; ambulated with ww      Pain Level: yes - pt unable to provided level of pain however gestures to RLE  Cognition: A&O: 2/4 - pt able to nod head yes/no however unable to verbalize month/year  Follows single step directions - requires increased time to comprehend directions, repeated verbal cues to stay on task easily distracted (attempting to explain pain with    Memory:  ?   Sequencing: fair -    Problem solving:  Fair -    Judgement/safety:  Fair -     Functional Assessment:  AM-PAC Daily Activity Raw Score: 11/24   Initial Eval Status  Date: 5/21/22 Treatment Status  Date: STGs = LTGs  Time frame: 10-14 days   Feeding Minimal Assist   demonstrated functional reach for cup from bedside tray   Requires set up of tray and opening packages  Moderate [place]    Grooming Moderate Assist   Pt demonstrated proper use of comb seated EOB  Requires assist d/t poor initiation and safety with task seated EOB  Moderate [place]    UB Dressing Moderate Assist   Doff soiled gown seated EOB and darleen new gown  Required HHA for threading RUE into gown (pt unable to follow cues for using LUE to assist RUE)  Minimal Assist    LB Dressing Dependent   Zain socks lying supine  Decreased functional reach d/t RLE pain and poor sequencing/command following   Maximal Assist    Bathing Maximal Assist  D/t R UE hemiplegia and safety with dynamic sitting balance   Minimal Assist    Toileting Dependent   Shaa catheter present  Moderate Assist    Bed Mobility  Log Roll: NT  Supine to sit: Moderate Assist   Sit to supine: Moderate Assist   Pt utilizing LUE to hook RLE to progress BLE to EOB and use of bed rail and assist bed rail for transfer     Supine to sit: Moderate [place]   Sit to supine: Moderate [place]    Functional Transfers Sit to stand: Max A x 2  Stand to sit: Max A x 2   LUE supported on bed rail pt requesting use of ww however required arm in arm assist with partial stand ~50% and return to sitting EOB for safety - pt resting RLE off ground and heavy L sided lean  Stand pivot: NT  Commode: NT  Sit to stand: Min A  Stand to sit: Min A  Stand pivot: Min A with AD  Commode: Min A with AD    Functional Mobility NT  Minimal Assist    Balance Sitting:     Static - Min A  <> SBA - educated on using LUE EOB for support d/t poor core strength     Dynamic - Min A  Standing:  Max A x 2  Sitting:  Static: ind  Dynamic: ind  Standing: Min A with AD   Activity Tolerance Fair -   Limited d/t pain and SOB/fatigue with prolonged tasks  Good   Visual/  Perceptual Glasses: none present in room        Vitals On 1L NC (no O2 at baseline)  SpO2 at rest - 93%  SpO2 EOB -  90% ~ 1 minute to recover to 93%  SpO2 following sit to stand- 91% ~ 1 minute to recover to 95% seated EOB  SpO2 end of session lying supine - 94%       Hand Dominance: L (d/t R sided hemiplegia)   AROM (PROM) Strength Additional Info:  Goal:   RUE  No AROM  Requires HHA for all flexion/extension of shoulder and elbow    R wrist/digit flexion contracture  NT   D/t hemiplegia Digit flexion contracture unable to test  strength   Improve overall RUE strength  for participation in functional tasks       LUE WFL 4/5 Good  and wfl FMC/dexterity noted during ADL tasks   Improve overall LUE strength to 4+/5 for participation in functional tasks       Hearing: [facility]   Sensation: pt shaking head no when asked if experiencing any numbness/tingling  Tone: R UE  Edema: unremarkable    Patient/Family Goal: pt unable to state goal - per chart pt to return to SNF at discharge    Comment: Cleared by RN to see pt. Upon arrival patient lying supine in bed and agreeable to OT session. At end of session, patient lying supine in bed with call light and phone within reach, all lines and tubes intact. Overall patient demonstrated  decreased independence and safety during completion of ADL/functional transfer/mobility tasks. Pt would benefit from continued skilled OT to increase safety and independence with completion of ADL/IADL tasks for functional independence and quality of life.     Treatment:   OT treatment provided this date includes:  Facilitation of bed mobility, unsupported sitting balance at EOB (decreased dynamic sitting balance impacting ADL - addressed posture, weight shifting, dynamic reaching to improve independence, difficulty with repositioning in bed requiring increased verbal cues and time to complete), functional transfer from supine to sitting EOB (verbal/tactile cues and retraining for transfers d/t decreased balance/sequencing for safety, addressed posture, balance and activity tolerance d/t impact on ADLs and functional activity) and sit to stand transfer x 1 (in preparation for ambulation to/ from bathroom; cuing on posture) LUE supported on bed rail pt requesting use of ww however required arm in arm assist with partial stand and return to sitting EOB for safety - skilled cuing on hand placement, posture, body mechanics, energy conservation techniques and safety during functional mobility and ADLs. Therapist facilitated self-care retraining: UB/LB self-care tasks (downing gown seated EOB), toileting hygiene task and grooming tasks while educating pt on modified techniques, posture, safety and energy conservation techniques. Skilled monitoring of BP, HR, O2 sats and pts response to treatment (monitored O2 - stats above). Rehab Potential: Good  for established goals       LTG: maximize independence with ADLs to return to PLOF    Patient and/or family were instructed on functional diagnosis, prognosis/goals and OT plan of care. Demonstrated fair understanding. [] Malnutrition indicators have been identified and nursing has been notified to ensure a dietitian consult is ordered. Eval Complexity: MOD  · History: Expanded chart review of medical records and additional review of physical, cognitive, or psychosocial history related to current functional performance  · Exam: 3+ performance deficits  · Assistance/Modification: MOD assistance or modifications required to perform tasks. May have comorbidities that affect occupational performance. Evaluation time includes thorough review of current medical information, gathering information on past medical & social history & PLOF, completion of standardized testing, informal observation of tasks, consultation with other medical professions/disciplines, assessment of data & development of POC/goals.      Time In: 1415  Time Out: 1450  Total Treatment Time: 20    Min Units   OT Eval Low 26303       OT Eval Medium 82778  x    OT Eval High 42121      OT Re-Eval [id number]       Therapeutic Ex 68295       Therapeutic Activities 94371  10  1   ADL/Self Care 20592  10  1   Orthotic Management 71200       Manual 80082     Neuro Re-Ed 03631       Non-Billable Time          Evaluation Time additionally includes thorough review of current medical information, gathering information on past medical history/social history and prior level of function, interpretation of standardized testing/informal observation of tasks, assessment of data and development of plan of care and goals.             JENIFER Fontanez OTR/L; [id number]

## 2022-05-22 ENCOUNTER — APPOINTMENT (OUTPATIENT)
Dept: GENERAL RADIOLOGY | Age: 52
DRG: 710 | End: 2022-05-22
Payer: MEDICAID

## 2022-05-22 LAB
ANION GAP SERPL CALCULATED.3IONS-SCNC: 13 MMOL/L (ref 7–16)
BASOPHILS ABSOLUTE: 0.01 E9/L (ref 0–0.2)
BASOPHILS RELATIVE PERCENT: 0.1 % (ref 0–2)
BUN BLDV-MCNC: 21 MG/DL (ref 6–20)
CALCIUM SERPL-MCNC: 8.6 MG/DL (ref 8.6–10.2)
CHLORIDE BLD-SCNC: 107 MMOL/L (ref 98–107)
CO2: 22 MMOL/L (ref 22–29)
CREAT SERPL-MCNC: 0.9 MG/DL (ref 0.7–1.2)
EOSINOPHILS ABSOLUTE: 0.08 E9/L (ref 0.05–0.5)
EOSINOPHILS RELATIVE PERCENT: 1 % (ref 0–6)
GFR AFRICAN AMERICAN: >60
GFR NON-AFRICAN AMERICAN: >60 ML/MIN/1.73
GLUCOSE BLD-MCNC: 217 MG/DL (ref 74–99)
HCT VFR BLD CALC: 37.3 % (ref 37–54)
HEMOGLOBIN: 12.5 G/DL (ref 12.5–16.5)
IMMATURE GRANULOCYTES #: 0.04 E9/L
IMMATURE GRANULOCYTES %: 0.5 % (ref 0–5)
LYMPHOCYTES ABSOLUTE: 2 E9/L (ref 1.5–4)
LYMPHOCYTES RELATIVE PERCENT: 25.4 % (ref 20–42)
MCH RBC QN AUTO: 26.8 PG (ref 26–35)
MCHC RBC AUTO-ENTMCNC: 33.5 % (ref 32–34.5)
MCV RBC AUTO: 79.9 FL (ref 80–99.9)
METER GLUCOSE: 222 MG/DL (ref 74–99)
METER GLUCOSE: 257 MG/DL (ref 74–99)
METER GLUCOSE: 278 MG/DL (ref 74–99)
METER GLUCOSE: 294 MG/DL (ref 74–99)
METER GLUCOSE: 310 MG/DL (ref 74–99)
MONOCYTES ABSOLUTE: 0.78 E9/L (ref 0.1–0.95)
MONOCYTES RELATIVE PERCENT: 9.9 % (ref 2–12)
NEUTROPHILS ABSOLUTE: 4.97 E9/L (ref 1.8–7.3)
NEUTROPHILS RELATIVE PERCENT: 63.1 % (ref 43–80)
PDW BLD-RTO: 13 FL (ref 11.5–15)
PLATELET # BLD: 254 E9/L (ref 130–450)
PMV BLD AUTO: 10.5 FL (ref 7–12)
POTASSIUM SERPL-SCNC: 3.7 MMOL/L (ref 3.5–5)
RBC # BLD: 4.67 E12/L (ref 3.8–5.8)
SODIUM BLD-SCNC: 142 MMOL/L (ref 132–146)
WBC # BLD: 7.9 E9/L (ref 4.5–11.5)

## 2022-05-22 PROCEDURE — 82962 GLUCOSE BLOOD TEST: CPT

## 2022-05-22 PROCEDURE — 6370000000 HC RX 637 (ALT 250 FOR IP): Performed by: STUDENT IN AN ORGANIZED HEALTH CARE EDUCATION/TRAINING PROGRAM

## 2022-05-22 PROCEDURE — 6360000002 HC RX W HCPCS: Performed by: STUDENT IN AN ORGANIZED HEALTH CARE EDUCATION/TRAINING PROGRAM

## 2022-05-22 PROCEDURE — 73610 X-RAY EXAM OF ANKLE: CPT

## 2022-05-22 PROCEDURE — 85025 COMPLETE CBC W/AUTO DIFF WBC: CPT

## 2022-05-22 PROCEDURE — 2700000000 HC OXYGEN THERAPY PER DAY

## 2022-05-22 PROCEDURE — 6370000000 HC RX 637 (ALT 250 FOR IP): Performed by: INTERNAL MEDICINE

## 2022-05-22 PROCEDURE — 80048 BASIC METABOLIC PNL TOTAL CA: CPT

## 2022-05-22 PROCEDURE — 73560 X-RAY EXAM OF KNEE 1 OR 2: CPT

## 2022-05-22 PROCEDURE — S5553 INSULIN LONG ACTING 5 U: HCPCS | Performed by: STUDENT IN AN ORGANIZED HEALTH CARE EDUCATION/TRAINING PROGRAM

## 2022-05-22 PROCEDURE — 2060000000 HC ICU INTERMEDIATE R&B

## 2022-05-22 PROCEDURE — 36415 COLL VENOUS BLD VENIPUNCTURE: CPT

## 2022-05-22 PROCEDURE — 73590 X-RAY EXAM OF LOWER LEG: CPT

## 2022-05-22 RX ORDER — ASCORBIC ACID 500 MG
500 TABLET ORAL DAILY
Status: DISCONTINUED | OUTPATIENT
Start: 2022-05-22 | End: 2022-05-23 | Stop reason: HOSPADM

## 2022-05-22 RX ORDER — ZINC SULFATE 50(220)MG
50 CAPSULE ORAL DAILY
Status: DISCONTINUED | OUTPATIENT
Start: 2022-05-22 | End: 2022-05-23 | Stop reason: HOSPADM

## 2022-05-22 RX ORDER — VITAMIN B COMPLEX
1000 TABLET ORAL DAILY
Status: DISCONTINUED | OUTPATIENT
Start: 2022-05-22 | End: 2022-05-23 | Stop reason: HOSPADM

## 2022-05-22 RX ADMIN — CARVEDILOL 25 MG: 25 TABLET, FILM COATED ORAL at 10:28

## 2022-05-22 RX ADMIN — INSULIN LISPRO 4 UNITS: 100 INJECTION, SOLUTION INTRAVENOUS; SUBCUTANEOUS at 06:49

## 2022-05-22 RX ADMIN — HEPARIN SODIUM 5000 UNITS: 10000 INJECTION INTRAVENOUS; SUBCUTANEOUS at 15:31

## 2022-05-22 RX ADMIN — LEVETIRACETAM 500 MG: 500 TABLET, FILM COATED ORAL at 20:56

## 2022-05-22 RX ADMIN — LEVETIRACETAM 500 MG: 500 TABLET, FILM COATED ORAL at 10:27

## 2022-05-22 RX ADMIN — AMLODIPINE BESYLATE 10 MG: 10 TABLET ORAL at 10:27

## 2022-05-22 RX ADMIN — HEPARIN SODIUM 5000 UNITS: 10000 INJECTION INTRAVENOUS; SUBCUTANEOUS at 06:50

## 2022-05-22 RX ADMIN — INSULIN LISPRO 6 UNITS: 100 INJECTION, SOLUTION INTRAVENOUS; SUBCUTANEOUS at 16:48

## 2022-05-22 RX ADMIN — ASPIRIN 325 MG: 325 TABLET, COATED ORAL at 10:27

## 2022-05-22 RX ADMIN — INSULIN LISPRO 6 UNITS: 100 INJECTION, SOLUTION INTRAVENOUS; SUBCUTANEOUS at 12:36

## 2022-05-22 RX ADMIN — DOCUSATE SODIUM 100 MG: 100 CAPSULE, LIQUID FILLED ORAL at 20:55

## 2022-05-22 RX ADMIN — OXYCODONE 5 MG: 5 TABLET ORAL at 20:55

## 2022-05-22 RX ADMIN — Medication 50 MG: at 10:27

## 2022-05-22 RX ADMIN — PANTOPRAZOLE SODIUM 40 MG: 40 TABLET, DELAYED RELEASE ORAL at 10:27

## 2022-05-22 RX ADMIN — CARVEDILOL 25 MG: 25 TABLET, FILM COATED ORAL at 16:48

## 2022-05-22 RX ADMIN — ATORVASTATIN CALCIUM 40 MG: 40 TABLET, FILM COATED ORAL at 20:55

## 2022-05-22 RX ADMIN — INSULIN GLARGINE-YFGN 12 UNITS: 100 INJECTION, SOLUTION SUBCUTANEOUS at 10:38

## 2022-05-22 RX ADMIN — Medication 1000 UNITS: at 10:28

## 2022-05-22 RX ADMIN — OXYCODONE HYDROCHLORIDE AND ACETAMINOPHEN 500 MG: 500 TABLET ORAL at 10:28

## 2022-05-22 ASSESSMENT — PAIN SCALES - GENERAL
PAINLEVEL_OUTOF10: 7
PAINLEVEL_OUTOF10: 10

## 2022-05-22 ASSESSMENT — PAIN DESCRIPTION - ORIENTATION: ORIENTATION: RIGHT

## 2022-05-22 ASSESSMENT — PAIN DESCRIPTION - LOCATION: LOCATION: HIP

## 2022-05-22 NOTE — PROGRESS NOTES
Department of Orthopedic Surgery  Resident Progress Note      SUBJECTIVE  Pt seen and examined. He is nonverbal. He gestures at possible issues at the right ankle radiating proximally. Gestures that his pain is ok. 5/10. Some weakness in right lower extremity    OBJECTIVE    Physical    VITALS:  /78   Pulse 71   Temp 96.9 °F (36.1 °C) (Temporal)   Resp 16   Ht 5' 10\" (1.778 m)   Wt 179 lb 14.3 oz (81.6 kg)   SpO2 94%   BMI 25.81 kg/m²     right LE:   · Dressing C/D/I  · Patient nods to distal sensory: sural, saphenous, tibial, superficial and deep peroneal nerves  · +2/4 PT and DP  · +PF/DF/EHL extremely weak. Patient un able to demonstrates when prompted to range his knee and hip. · Passively ranging hip to >30 degrees causes discomfort. · Compartments soft and compressible    Data    CBC:   Lab Results   Component Value Date    WBC 10.6 05/19/2022    RBC 5.25 05/19/2022    HGB 14.1 05/19/2022    HCT 42.7 05/19/2022    MCV 81.3 05/19/2022    MCH 26.9 05/19/2022    MCHC 33.0 05/19/2022    RDW 12.9 05/19/2022     05/19/2022    MPV 9.9 05/19/2022     PT/INR:    Lab Results   Component Value Date    PROTIME 15.9 05/19/2022    INR 1.4 05/19/2022         ASSESSMENT   · POD 2 s/p ORIF right femoral neck 5/20/2022    PLAN    · Weight bearing as tolerated RLE  · Xray right ankle, tibia, and knee ordered  · 24 hour abx coverage: completed  · Deep venous thrombosis prophylaxis - ASA, early mobilization  · PT/OT ordered to allow mobilization. · Pain Control: IV and PO  · Monitor H&H: CBC ordered  · D/C Plan:  Pending, appreciate CM/SW recs.  Follow up in office in 2 weeks for XRs, and staple removal.

## 2022-05-22 NOTE — ANESTHESIA POSTPROCEDURE EVALUATION
Department of Anesthesiology  Postprocedure Note    Patient: Emmanuel Christensen  MRN: [id number]  YOB: 1970  Date of evaluation: 5/22/2022  Time:  4:29 PM     Procedure Summary     Date: 05/20/22 Room / Location: [facility] OR 12 / [facility]    Anesthesia Start: 6145 Anesthesia Stop: 1700    Procedure: HIP OPEN REDUCTION INTERNAL FIXATION (Right Hip) Diagnosis: (FX RIGHT HIP)    Surgeons: Byron Pozo MD Responsible Provider: Kingsley De Leon MD    Anesthesia Type: general ASA Status: 4          Anesthesia Type: No value filed. Tricia Phase I:      Tricia Phase II:      Last vitals: Reviewed and per EMR flowsheets.        Anesthesia Post Evaluation    Patient location during evaluation: PACU  Patient participation: complete - patient participated  Level of consciousness: awake  Pain score: 0  Airway patency: patent  Nausea & Vomiting: no nausea  Complications: no  Cardiovascular status: blood pressure returned to baseline  Respiratory status: acceptable  Hydration status: euvolemic

## 2022-05-22 NOTE — PROGRESS NOTES
Hospitalist Progress Note      PCP: Juju Velazquez MD    Date of Admission: 5/18/2022        Hospital Course:  *45 y. o. male presented with right hip pain, found to have a closed right femoral neck fracture , he resides in a facility , and is non verbal but understands when asked questions.   He was also found to be COVID POS  AND UPON PRESENTATION HE HAD A TEMP .8* or ON MAY 20**           Subjective: *RIGHT HIP PAIN   **      Medications:  Reviewed    Infusion Medications    sodium chloride      dextrose       Scheduled Medications    ascorbic acid  500 mg Oral Daily    zinc sulfate  50 mg Oral Daily    Vitamin D  1,000 Units Oral Daily    aspirin  325 mg Oral Daily    sodium chloride flush  5-40 mL IntraVENous 2 times per day    amLODIPine  10 mg Oral Daily    atorvastatin  40 mg Oral Nightly    carvedilol  25 mg Oral BID WC    levETIRAcetam  500 mg Oral BID    insulin lispro  0-10 Units SubCUTAneous 4x Daily AC & HS    docusate sodium  100 mg Oral Nightly    pantoprazole  40 mg Oral QAM AC    insulin glargine  12 Units SubCUTAneous BID    heparin (porcine)  5,000 Units SubCUTAneous Q8H     PRN Meds: sodium chloride flush, sodium chloride, acetaminophen **OR** acetaminophen, bisacodyl, trimethobenzamide, glucose, glucagon (rDNA), dextrose, morphine, oxyCODONE **OR** oxyCODONE, ondansetron, dextrose      Intake/Output Summary (Last 24 hours) at 5/22/2022 1159  Last data filed at 5/22/2022 1033  Gross per 24 hour   Intake 600 ml   Output 1500 ml   Net -900 ml       Exam:    /78   Pulse 71   Temp 96.9 °F (36.1 °C) (Temporal)   Resp 16   Ht 5' 10\" (1.778 m)   Wt 179 lb 14.3 oz (81.6 kg)   SpO2 94%   BMI 25.81 kg/m²       General appearance:  No apparent distress,   HEENT:  Normal cephalic, atraumatic   Respiratory:  Normal respiratory effort.  Clear to auscultation,Cardiovascular:  Regular rate and rhythm   Abdomen: Soft, non-tender, non-distended with normal bowel sounds. Musculoskeletal:  No clubbing, cyanosis or edema bilaterally.    Skin: Skin color, texture, turgor normal.  No rashes or lesions. Neurologic:  He has paralysis of his RUE AND RLE,  External rotation noted  Psychiatric:  Alert and oriented,                   Labs:   No results for input(s): WBC, HGB, HCT, PLT in the last 72 hours. Recent Labs     05/20/22  0707 05/22/22  0829    142   K 3.8 3.7    107   CO2 23 22   BUN 17 21*   CREATININE 1.0 0.9   CALCIUM 8.4* 8.6     No results for input(s): AST, ALT, BILIDIR, BILITOT, ALKPHOS in the last 72 hours. No results for input(s): INR in the last 72 hours. No results for input(s): Kody Snowball in the last 72 hours. No results for input(s): AST, ALT, ALB, BILIDIR, BILITOT, ALKPHOS in the last 72 hours. No results for input(s): LACTA in the last 72 hours. No results found for: Napoleon Fan  No results found for: AMMONIA    Assessment:    Active Hospital Problems    Diagnosis Date Noted    Closed fracture of neck of right femur ([facility] [S72.001A] 05/19/2022     Priority: Medium    History of CVA (cerebrovascular accident) [Z86.73] 05/19/2022     Priority: Medium    History of seizures [Z87.898] 05/19/2022     Priority: Medium    Primary hypertension [I10] 05/19/2022     Priority: Medium    Type 2 diabetes mellitus ([facility] [E11.9] 05/19/2022     Priority: Medium    Sepsis ([facility] [A41.9] 05/19/2022     Priority: Medium    Fall [W19. XXXA] 05/19/2022     Priority: Medium   HLD  SEIZURE DISORDER      PLAN:  WILL GET CARDIAC CLEARANCE( CLEARED)  SSI  HEPARIN FOR DVE PROPHYLAXIS FOR NOW   LIPITOR  NORVASC   VITAMIN C, d , AND ZINC        DVT Prophylaxis: *HEPARIN  Diet: Diet NPO  Code Status: Full Code     PT/OT Eval Status:  WHEN STABLE     Dispo - *[facility]       Electronically signed by Precious Chang DO on 5/22/2022 at [address] [place] [address]

## 2022-05-22 NOTE — PLAN OF CARE
Problem: Pain  Goal: Verbalizes/displays adequate comfort level or baseline comfort level  Outcome: Progressing     Problem: Safety - Adult  Goal: Free from fall injury  Outcome: Progressing  Flowsheets (Taken 5/22/2022 0126)  Free From Fall Injury: Instruct family/caregiver on patient safety     Problem: Skin/Tissue Integrity  Goal: Absence of new skin breakdown  Description: 1. Monitor for areas of redness and/or skin breakdown  2. Assess vascular access sites hourly  3. Every 4-6 hours minimum:  Change oxygen saturation probe site  4. Every 4-6 hours:  If on nasal continuous positive airway pressure, respiratory therapy assess nares and determine need for appliance change or resting period.   Outcome: Progressing

## 2022-05-23 VITALS
RESPIRATION RATE: 16 BRPM | SYSTOLIC BLOOD PRESSURE: 124 MMHG | DIASTOLIC BLOOD PRESSURE: 84 MMHG | BODY MASS INDEX: 25.75 KG/M2 | OXYGEN SATURATION: 94 % | HEART RATE: 80 BPM | WEIGHT: 179.9 LBS | TEMPERATURE: 96.8 F | HEIGHT: 70 IN

## 2022-05-23 LAB
ANION GAP SERPL CALCULATED.3IONS-SCNC: 12 MMOL/L (ref 7–16)
BLOOD CULTURE, ROUTINE: NORMAL
BUN BLDV-MCNC: 18 MG/DL (ref 6–20)
CALCIUM SERPL-MCNC: 9 MG/DL (ref 8.6–10.2)
CHLORIDE BLD-SCNC: 105 MMOL/L (ref 98–107)
CO2: 24 MMOL/L (ref 22–29)
CREAT SERPL-MCNC: 0.8 MG/DL (ref 0.7–1.2)
CULTURE, BLOOD 2: NORMAL
GFR AFRICAN AMERICAN: >60
GFR NON-AFRICAN AMERICAN: >60 ML/MIN/1.73
GLUCOSE BLD-MCNC: 190 MG/DL (ref 74–99)
METER GLUCOSE: 197 MG/DL (ref 74–99)
METER GLUCOSE: 257 MG/DL (ref 74–99)
METER GLUCOSE: 315 MG/DL (ref 74–99)
POTASSIUM SERPL-SCNC: 3.6 MMOL/L (ref 3.5–5)
SODIUM BLD-SCNC: 141 MMOL/L (ref 132–146)

## 2022-05-23 PROCEDURE — 6370000000 HC RX 637 (ALT 250 FOR IP): Performed by: INTERNAL MEDICINE

## 2022-05-23 PROCEDURE — 6370000000 HC RX 637 (ALT 250 FOR IP): Performed by: STUDENT IN AN ORGANIZED HEALTH CARE EDUCATION/TRAINING PROGRAM

## 2022-05-23 PROCEDURE — 80048 BASIC METABOLIC PNL TOTAL CA: CPT

## 2022-05-23 PROCEDURE — 2700000000 HC OXYGEN THERAPY PER DAY

## 2022-05-23 PROCEDURE — S5553 INSULIN LONG ACTING 5 U: HCPCS | Performed by: STUDENT IN AN ORGANIZED HEALTH CARE EDUCATION/TRAINING PROGRAM

## 2022-05-23 PROCEDURE — 97530 THERAPEUTIC ACTIVITIES: CPT

## 2022-05-23 PROCEDURE — 36415 COLL VENOUS BLD VENIPUNCTURE: CPT

## 2022-05-23 PROCEDURE — 97535 SELF CARE MNGMENT TRAINING: CPT

## 2022-05-23 PROCEDURE — 6360000002 HC RX W HCPCS: Performed by: STUDENT IN AN ORGANIZED HEALTH CARE EDUCATION/TRAINING PROGRAM

## 2022-05-23 PROCEDURE — 82962 GLUCOSE BLOOD TEST: CPT

## 2022-05-23 RX ORDER — INSULIN GLARGINE-YFGN 100 [IU]/ML
12 INJECTION, SOLUTION SUBCUTANEOUS 2 TIMES DAILY
DISCHARGE
Start: 2022-05-23

## 2022-05-23 RX ORDER — AMLODIPINE BESYLATE 10 MG/1
10 TABLET ORAL DAILY
Qty: 30 TABLET | Refills: 3 | DISCHARGE
Start: 2022-05-24

## 2022-05-23 RX ORDER — ASCORBIC ACID 500 MG
500 TABLET ORAL DAILY
Qty: 30 TABLET | Refills: 3 | DISCHARGE
Start: 2022-05-24

## 2022-05-23 RX ORDER — ZINC SULFATE 50(220)MG
50 CAPSULE ORAL DAILY
Qty: 30 CAPSULE | Refills: 3 | DISCHARGE
Start: 2022-05-24

## 2022-05-23 RX ORDER — ATORVASTATIN CALCIUM 40 MG/1
40 TABLET, FILM COATED ORAL NIGHTLY
Qty: 30 TABLET | Refills: 3 | DISCHARGE
Start: 2022-05-23

## 2022-05-23 RX ORDER — POTASSIUM CHLORIDE 20 MEQ/1
40 TABLET, EXTENDED RELEASE ORAL ONCE
Status: COMPLETED | OUTPATIENT
Start: 2022-05-23 | End: 2022-05-23

## 2022-05-23 RX ORDER — VITAMIN B COMPLEX
1000 TABLET ORAL DAILY
Qty: 60 TABLET | DISCHARGE
Start: 2022-05-24

## 2022-05-23 RX ORDER — CARVEDILOL 25 MG/1
25 TABLET ORAL 2 TIMES DAILY WITH MEALS
Qty: 60 TABLET | Refills: 3 | DISCHARGE
Start: 2022-05-23

## 2022-05-23 RX ORDER — INSULIN LISPRO 100 [IU]/ML
0-10 INJECTION, SOLUTION INTRAVENOUS; SUBCUTANEOUS
DISCHARGE
Start: 2022-05-23

## 2022-05-23 RX ADMIN — HEPARIN SODIUM 5000 UNITS: 10000 INJECTION INTRAVENOUS; SUBCUTANEOUS at 05:29

## 2022-05-23 RX ADMIN — LEVETIRACETAM 500 MG: 500 TABLET, FILM COATED ORAL at 10:06

## 2022-05-23 RX ADMIN — Medication 50 MG: at 10:06

## 2022-05-23 RX ADMIN — INSULIN LISPRO 2 UNITS: 100 INJECTION, SOLUTION INTRAVENOUS; SUBCUTANEOUS at 05:29

## 2022-05-23 RX ADMIN — OXYCODONE 5 MG: 5 TABLET ORAL at 10:23

## 2022-05-23 RX ADMIN — OXYCODONE HYDROCHLORIDE 10 MG: 10 TABLET ORAL at 01:36

## 2022-05-23 RX ADMIN — CARVEDILOL 25 MG: 25 TABLET, FILM COATED ORAL at 10:06

## 2022-05-23 RX ADMIN — OXYCODONE HYDROCHLORIDE 10 MG: 10 TABLET ORAL at 05:29

## 2022-05-23 RX ADMIN — Medication 1000 UNITS: at 10:07

## 2022-05-23 RX ADMIN — ASPIRIN 325 MG: 325 TABLET, COATED ORAL at 10:07

## 2022-05-23 RX ADMIN — INSULIN GLARGINE-YFGN 12 UNITS: 100 INJECTION, SOLUTION SUBCUTANEOUS at 10:24

## 2022-05-23 RX ADMIN — POTASSIUM CHLORIDE 40 MEQ: 20 TABLET, EXTENDED RELEASE ORAL at 10:06

## 2022-05-23 RX ADMIN — PANTOPRAZOLE SODIUM 40 MG: 40 TABLET, DELAYED RELEASE ORAL at 05:29

## 2022-05-23 RX ADMIN — AMLODIPINE BESYLATE 10 MG: 10 TABLET ORAL at 10:18

## 2022-05-23 RX ADMIN — OXYCODONE HYDROCHLORIDE AND ACETAMINOPHEN 500 MG: 500 TABLET ORAL at 10:19

## 2022-05-23 ASSESSMENT — PAIN DESCRIPTION - ONSET
ONSET: ON-GOING

## 2022-05-23 ASSESSMENT — PAIN DESCRIPTION - PAIN TYPE
TYPE: SURGICAL PAIN

## 2022-05-23 ASSESSMENT — PAIN DESCRIPTION - FREQUENCY
FREQUENCY: CONTINUOUS

## 2022-05-23 ASSESSMENT — PAIN DESCRIPTION - DESCRIPTORS
DESCRIPTORS: PATIENT UNABLE TO DESCRIBE

## 2022-05-23 ASSESSMENT — PAIN DESCRIPTION - LOCATION
LOCATION: HIP

## 2022-05-23 ASSESSMENT — PAIN DESCRIPTION - ORIENTATION
ORIENTATION: RIGHT

## 2022-05-23 ASSESSMENT — PAIN SCALES - GENERAL
PAINLEVEL_OUTOF10: 1
PAINLEVEL_OUTOF10: 7
PAINLEVEL_OUTOF10: 7

## 2022-05-23 ASSESSMENT — PAIN SCALES - WONG BAKER: WONGBAKER_NUMERICALRESPONSE: 2

## 2022-05-23 NOTE — PROGRESS NOTES
Physical Therapy  Treatment    Name: Juany Burleson  : 1970  MRN: [id number]      Date of Service: 2022    Evaluating PT:  Drea Tatum PT, DPT [id number]    Room #:  9267/0723-A  Diagnosis:  Closed displaced fracture of right femoral neck ([facility] [S72.001A]  PMHx/PSHx:    Past Medical History:   Diagnosis Date    Cerebral artery occlusion with cerebral infarction ([facility]     Diabetes mellitus ([facility]     Hypertension     Seizures ([facility]      Procedure/Surgery:  ORIF R femoral neck (2022)  Reason for admission: R femoral neck fracture (fall)  Precautions: Falls, cognition, COVID + (Droplet Plus isolation), R sided weakness (hx L MCA stroke), non-verbal/aphasic ( - sign language), WBAT RLE, O2  Equipment Needs:  TBD    SUBJECTIVE:  Pt admitted from facility where he is long term resident. Pt unable to answer questions during subjective regarding PLOF so PLOF unknown. OBJECTIVE:   Initial Evaluation  Date: 22 Treatment  2022 Short Term/ Long Term   Goals   AM-PAC 6 Clicks     Was pt agreeable to Eval/treatment? Yes Yes    Does pt have pain?  Yes, unable to quantify but gestures to RLE when asked about pain  Moderate RLE    Bed Mobility  Rolling: NT  Supine to sit: mod a  Sit to supine: min a  Scooting: mod a Rolling: Yari  Supine to sit: MaxA x2  Sit to supine: MaxA x2  Scooting: MaxA (seated) Rolling: ind  Supine to sit: ind  Sit to supine: ind  Scooting: ind   Transfers Sit to stand: Max a x2 attempted, 50% stand height  Stand to sit: max a x2  Stand pivot: NT Sit to stand: MaxA x2  Stand to sit: MaxA x2  Stand pivot: NT Sit to stand: min a  Stand to sit: min a  Stand pivot: min a FWW   Ambulation    unable to attempt NT 50 feet with FWW min a   Stair negotiation: ascended and descended  unable to attempt  NT TBD   ROM BUE:  Refer to OT eval   BLE:  WNL     Strength BUE:  Refer to OT eval   BLE:  Weakness on R (from previous stroke), unable to fully formally assess as pt with difficulty understanding commands  4+/5 globally    Balance Sitting EOB:  SBA  Dynamic Standing:  NT Sitting EOB:  SBA  Dynamic Standing:  NT Sitting EOB:  ind  Dynamic Standing:  Min a FWW     Pt is A & O x 1 (self)  Sensation:  No reports of numbness/tingling to extremities  Edema:  Unremarkable    Vitals:   HR 67, SpO2 95% (2 L O2) at rest.  SpO2 96% (2 L O2) during activity. SpO2 97%, (2 L O2) following activity. Patient education  Pt educated on weight bearing status, safety during functional mobility. Patient response to education:   Pt verbalized understanding Pt demonstrated skill Pt requires further education in this area   No Partial Yes     ASSESSMENT:    Comments:  Session cleared by nursing.  Shirley Sharp #[id number]) utilized to communicate with patient. Patient in semi-Chase's position upon arrival; agreeable to PT session with OT collaboration. Required increased time, assistance of trunk and BLE to perform bed mobility. Tolerated sitting EOB for extended period of time while interdisciplinary care was provided. Sit to stand transfer performed with B knee block; tolerated static standing for short duration. Exhibited inconsistent command follow; exhibited tendency to cross legs and hold onto bed rail upon attempting sit to stand transfer, initially resisting movement. Upon returning to supine with assistance, rolling performed multiple times in order to replace pads underneath patient. Patient left in semi-Chase's position with call light in reach. Patient exhibited minimal interaction with  throughout session; per , patient did not respond appropriately to sign language;  questioned patient's understanding of sign language; nursing notified.     Treatment:  Patient practiced and was instructed in the following treatment:     Bed mobility - verbal cues to facilitate proper positioning and sequencing; physical assistance provided during activity   Static/dynamic sitting - performed to promote upright tolerance and balance maintenance   Functional transfers - verbal cues to facilitate proper positioning and sequencing, particularly related to hand/foot placement; physical assistance provided during activity   Skilled positioning - patient positioned optimally to protect skin/joint integrity and promote comfort    PLAN:    Patient is making good progress towards established goals. Will continue with current POC.       Time in  0900  Time out  0930    Total Treatment Time  30 minutes     CPT codes:  [] Gait training 42688 0 minutes  [] Manual therapy 79800 0 minutes  [x] Therapeutic activities 58464 30 minutes  [] Therapeutic exercises 40750 0 minutes  [] Neuromuscular reeducation 42147 0 minutes    Alamosa Mitchell, PT, DPT  [id number]

## 2022-05-23 NOTE — PROGRESS NOTES
Hospitalist Progress Note      PCP: Yudi Ayala MD    Date of Admission: 5/18/2022        Hospital Course:  **45 y. o. male presented with right hip pain, found to have a closed right femoral neck fracture , he resides in a facility , and is non verbal but understands when asked questions.   He was also found to be COVID POS    ·  *POD 2 s/p ORIF right femoral neck 5/20/2022          Subjective: *RIGHT HIP PAIN**           Medications:  Reviewed    Infusion Medications    sodium chloride      dextrose       Scheduled Medications    ascorbic acid  500 mg Oral Daily    zinc sulfate  50 mg Oral Daily    Vitamin D  1,000 Units Oral Daily    aspirin  325 mg Oral Daily    sodium chloride flush  5-40 mL IntraVENous 2 times per day    amLODIPine  10 mg Oral Daily    atorvastatin  40 mg Oral Nightly    carvedilol  25 mg Oral BID WC    levETIRAcetam  500 mg Oral BID    insulin lispro  0-10 Units SubCUTAneous 4x Daily AC & HS    docusate sodium  100 mg Oral Nightly    pantoprazole  40 mg Oral QAM AC    insulin glargine  12 Units SubCUTAneous BID    heparin (porcine)  5,000 Units SubCUTAneous Q8H     PRN Meds: sodium chloride flush, sodium chloride, acetaminophen **OR** acetaminophen, bisacodyl, trimethobenzamide, glucose, glucagon (rDNA), dextrose, morphine, oxyCODONE **OR** oxyCODONE, ondansetron, dextrose      Intake/Output Summary (Last 24 hours) at 5/23/2022 1420  Last data filed at 5/23/2022 0842  Gross per 24 hour   Intake 240 ml   Output 1700 ml   Net -1460 ml       Exam:    /84   Pulse 80   Temp 96.8 °F (36 °C) (Temporal)   Resp 16   Ht 5' 10\" (1.778 m)   Wt 179 lb 14.3 oz (81.6 kg)   SpO2 94%   BMI 25.81 kg/m²       General appearance:  No apparent distress,   HEENT:  Normal cephalic, atraumatic   Respiratory:  Normal respiratory effort.  Clear to auscultation,Cardiovascular:  Regular rate and rhythm   Abdomen: Soft, non-tender, non-distended with normal bowel sounds. Musculoskeletal:  No clubbing, cyanosis or edema bilaterally.  DSD INTACT RIGHT HIP   Skin: Skin color, texture, turgor normal.  No rashes or lesions. Neurologic:  He has paralysis of his RUE AND RLE,  External rotation noted  Psychiatric:  Alert and oriented,                Labs:   Recent Labs     05/22/22  0829   WBC 7.9   HGB 12.5   HCT 37.3        Recent Labs     05/22/22  0829 05/23/22  0611    141   K 3.7 3.6    105   CO2 22 24   BUN 21* 18   CREATININE 0.9 0.8   CALCIUM 8.6 9.0     No results for input(s): AST, ALT, BILIDIR, BILITOT, ALKPHOS in the last 72 hours. No results for input(s): INR in the last 72 hours. No results for input(s): Jamal Maisha in the last 72 hours. No results for input(s): AST, ALT, ALB, BILIDIR, BILITOT, ALKPHOS in the last 72 hours. No results for input(s): LACTA in the last 72 hours. No results found for: Kai Ponantionette  No results found for: AMMONIA    Assessment:    Active Hospital Problems    Diagnosis Date Noted    Closed fracture of neck of right femur ([facility] [S72.001A] 05/19/2022     Priority: Medium    History of CVA (cerebrovascular accident) [Z86.73] 05/19/2022     Priority: Medium    History of seizures [Z87.898] 05/19/2022     Priority: Medium    Primary hypertension [I10] 05/19/2022     Priority: Medium    Type 2 diabetes mellitus ([facility] [E11.9] 05/19/2022     Priority: Medium    Sepsis ([facility] [A41.9] 05/19/2022     Priority: Medium    Fall [W19. XXXA] 05/19/2022     Priority: Medium   · POD 3 s/p ORIF right femoral neck 5/20/2022  HLD  SEIZURE DISORDER     Plan:  * DC TO [facility]    Electronically signed by Mateusz Cavanaugh DO on 5/23/2022 at 2:20 PM [facility]

## 2022-05-23 NOTE — CARE COORDINATION
Discharge Order is in. Notified [facility] that Pt is to discharge after Potasium is replaced. Scheduled [facility] for  at 5 pm.  Notified Charge RN of  time. Attempted to notify Pt and Sister Ada Tolentino but no answer by phone. Notified par [phone] [facility] that  is 5 pm for Pt. Discharge plan is to return to [facility]. See soft chart for envelope and ambulance form.    Zhou Crawford, L.S.W.  [phone]

## 2022-05-23 NOTE — PLAN OF CARE
Problem: Discharge Planning  Goal: Discharge to home or other facility with appropriate resources  Outcome: Progressing     Problem: Pain  Goal: Verbalizes/displays adequate comfort level or baseline comfort level  Outcome: Progressing     Problem: Safety - Adult  Goal: Free from fall injury  Outcome: Progressing     Problem: Chronic Conditions and Co-morbidities  Goal: Patient's chronic conditions and co-morbidity symptoms are monitored and maintained or improved  Outcome: Progressing     Problem: Musculoskeletal - Adult  Goal: Return mobility to safest level of function  Outcome: Progressing  Goal: Maintain proper alignment of affected body part  Outcome: Progressing  Goal: Return ADL status to a safe level of function  Outcome: Progressing

## 2022-05-23 NOTE — PROGRESS NOTES
OCCUPATIONAL THERAPY TREATMENT NOTE    [facility] TREATMENT NOTE      Date:2022  Patient Name: Morteza Batista  MRN: [id number]  : 1970  Room: [address] [place] [address]        Per Eval:    Evaluating OT: Manford Lei, [address] OTR/L; [id number]       Referring Provider: Karen Quezada DO    Specific Provider Orders/Date: OT Eval and Treat 2022       Diagnosis: Closed fracture of neck of right femur     Surgery: Open reduction internal fixation R femoral neck fracture 2022     Pertinent Medical History:  has a past medical history of Cerebral artery occlusion with cerebral infarction ([facility] Diabetes mellitus ([facility] Hypertension, and Seizures ([facility]    Reason for Admission: unwitnessed fall at SNF found lying on the ground by his bed     Recommended Adaptive Equipment:  TBD pending discharge/progression      Precautions:  DROPLET PRECAUTIONS (COVID), Fall Risk, Hx of CVA (R sided hemiplegia - wrist flexion contracture), R femur fracture (WBAT RLE), aphasia d/t previous CVA (non-verbal/able to answer yes/no questions - inconsistent), +alarms,      ID #: [id number]      Assessment of current deficits:    [x]? Functional mobility            [x]?ADLs           [x]? Strength                  [x]? Cognition    [x]? Functional transfers          [x]? IADLs         [x]? Safety Awareness   [x]? Endurance    [x]? Fine Coordination                         [x]? Balance      []? Vision/perception   []? Sensation      []? Gross Motor Coordination             []? ROM           []?  Delirium                   []? Motor Control      OT PLAN OF CARE   OT POC based on physician orders, patient diagnosis and results of clinical assessment     Frequency/Duration: 1-3 days/wk for 2 weeks PRN   Specific OT Treatment Interventions to include:   * Instruction/training on adapted ADL techniques and AE recommendations to increase functional independence within precautions       * Training on energy conservation strategies, correct breathing pattern and techniques to improve independence/tolerance for self-care routine  * Functional transfer/mobility training/DME recommendations for increased independence, safety, and fall prevention  * Patient/Family education to increase follow through with safety techniques and functional independence  * Recommendation of environmental modifications for increased safety with functional transfers/mobility and ADLs  * Therapeutic exercise to improve motor endurance, ROM, and functional strength for ADLs/functional transfers  * Therapeutic activities to facilitate/challenge dynamic balance, stand tolerance for increased safety and independence with ADLs     Home Living: Poor historian d/t aphasia. Per chart admitted from [facility] where he is a long term bed hold. Bathroom setup: handicap accessible   Equipment owned: alfred, w/c     Prior Level of Function: assist from staff with ADLs and with IADLs; ambulated with ww        Pain Level: Pt demo'd facial grimaces & gestures to RLE during minimal movement; therapist provided repositioning techniues  Cognition: A&O: 2/4 - Pt required ma verbal/tactile cues to follow simple-1 step commands throughout duration of session. Pt appears to only nod head at random questions - difficulty to assess if cognition versus language barrier at this time.               Memory:  ?              Sequencing: fair -               Problem solving:  Fair -               Judgement/safety:  Fair -                Functional Assessment:  AM-PAC Daily Activity Raw Score: 11/24    Initial Eval Status  Date: 5/21/22 Treatment Status  Date: 5/23/22 STGs = LTGs  Time frame: 10-14 days   Feeding Minimal Assist   demonstrated functional reach for cup from bedside tray   Requires set up of tray and opening packages  NT Moderate [place]    Grooming Moderate Assist   Pt demonstrated proper use of comb seated EOB  Requires assist d/t poor initiation and safety with task seated EOB  Moderate Assist  Pt sat EOB to brush hair & wash face - pt required hand over hand assist for task initiation/completion. Moderate [place]    UB Dressing Moderate Assist   Doff soiled gown seated EOB and zain new gown  Required HHA for threading RUE into gown (pt unable to follow cues for using LUE to assist RUE)  NT Minimal Assist    LB Dressing Dependent   Zain socks lying supine  Decreased functional reach d/t RLE pain and poor sequencing/command following  Dependent   Pt required assist & max verbal/tactile cues to don socks seated EOB following proper body mechanics & directional cuing. Maximal Assist    Bathing Maximal Assist  D/t R UE hemiplegia and safety with dynamic sitting balance  Maximal Assist   Pt simulated seated EOB with max cuing to follow commands. Minimal Assist    Toileting Dependent   Hernandez catheter present  Dependent  Pt has hernandez. Moderate Assist    Bed Mobility  Log Roll: NT  Supine to sit: Moderate Assist   Sit to supine: Moderate Assist   Pt utilizing LUE to hook RLE to progress BLE to EOB and use of bed rail and assist bed rail for transfer      Log Roll: Minimal Assist  Supine to sit: Maximal Assist x2   Sit to supine: Maximal Assist x2  Assist for trunk control & BLE management. Supine to sit: Moderate [place]   Sit to supine: Moderate [place]    Functional Transfers Sit to stand: Max A x 2  Stand to sit: Max A x 2   LUE supported on bed rail pt requesting use of ww however required arm in arm assist with partial stand ~50% and return to sitting EOB for safety - pt resting RLE off ground and heavy L sided lean  Stand pivot: NT  Commode: NT Sit to stand: Maximal Assist x 2  Stand to sit:Maximal Assist x 2  Pt required assist for BLE knee blocking technique to maintain upright posture. Pt required continuous cuing/assist to maintain proper body mechanics.  Sit to stand: Min A  Stand to sit: Min A  Stand pivot: Min A with AD  Commode: Min A with AD    Functional Mobility NT  NT Minimal Assist    Balance Sitting:     Static - Min A  <> SBA - educated on using LUE EOB for support d/t poor core strength     Dynamic - Min A  Standing: Max A x 2  Sitting:     Static - Minimal Assist      Dynamic - Min  Standing: Maximal Assist x 2 Sitting:  Static: ind  Dynamic: ind  Standing: Min A with AD   Activity Tolerance Fair -   Limited d/t pain and SOB/fatigue with prolonged tasks Fair-    Good   Visual/  Perceptual Glasses: none present in room          Vitals On 1L NC (no O2 at baseline)  SpO2 at rest - 93%  SpO2 EOB -  90% ~ 1 minute to recover to 93%  SpO2 following sit to stand- 91% ~ 1 minute to recover to 95% seated EOB  SpO2 end of session lying supine - 94%  Pt on 2L O2 nasal cannula upon arrival. SpO2 96-97% throughout duration of session.        Hand Dominance: L (d/t R sided hemiplegia)    AROM (PROM) Strength Additional Info:  Goal:   RUE  No AROM  Requires HHA for all flexion/extension of shoulder and elbow     R wrist/digit flexion contracture  NT   D/t hemiplegia Digit flexion contracture unable to test  strength    Improve overall RUE strength  for participation in functional tasks         LUE WFL 4/5 Good  and wfl FMC/dexterity noted during ADL tasks    Improve overall LUE strength to 4+/5 for participation in functional tasks         Hearing: [facility]   Sensation: pt shaking head no when asked if experiencing any numbness/tingling  Tone: R UE  Edema: unremarkable    Comments: RN approved session. Upon arrival pt supine in bed. Pt required max verbal/tactile cues throughout session for sequencing/processing of commands & task initiation/completion. Pt educated on WBAT RLE to promote safety awareness & engagement throughout functional tasks. Pt required assist for trunk control & BLE management with cuing to complete supine<>sit transfers.  Pt sat EOB ~10 minutes to engage in ADL activities with cuing/assist for dynamic sitting balance, processing of commands, & task initiation/completing while washing face & brushing hair. Pt attempted x1 sit<>stand transfer with blair knee-blocking technique & cuing/assist for proper body mechanics - pt unable to take side steps at this time. At end of session pt supine in bed all lines and tubes intact, +bed alarm, call light within reach. · Pt has made fair- progress towards set goals.    · Continue with current plan of care      Treatment Time In:9:00a            Treatment Time Out: 9:30a                Treatment Charges: Mins Units   Ther Ex  46552     Manual Therapy 87180     Thera Activities 65815 15 1   ADL/Home Mgt 04084 15 1   Neuro Re-ed [id number]     Group Therapy      Orthotic manage/training  [id number]     Non-Billable Time     Total Timed Treatment 30 7837 [facility] OTR/L; [id number]

## 2022-05-23 NOTE — PROGRESS NOTES
When trying to administer insulin and heparin to patient he began yelling and refused. Pt educated on importance of them and he still refused.

## 2022-05-24 ENCOUNTER — TELEPHONE (OUTPATIENT)
Dept: CARDIOLOGY CLINIC | Age: 52
End: 2022-05-24

## 2022-05-25 NOTE — DISCHARGE SUMMARY
Hospitalist Discharge Summary    Patient ID: Fallon Ibarra   Patient : 1970  Patient's PCP: Abdul Schaumann, MD    Admit Date: 2022   Admitting Physician: eJssica Lugo DO    Discharge Date:  2022   Discharge Physician: Jessica Lugo DO   Discharge Condition: Stable  Discharge Disposition: Skilled Facility      Discharge Diagnoses: Active Hospital Problems    Diagnosis Date Noted    Closed fracture of neck of right femur ([facility] [address] [S72.001A] 2022     Priority: Medium    History of CVA (cerebrovascular accident) [Z86.73] 2022     Priority: Medium    History of seizures [Z87.898] 2022     Priority: Medium    Primary hypertension [I10] 2022     Priority: Medium    Type 2 diabetes mellitus ([facility] [address] [E11.9] 2022     Priority: Medium    Sepsis ([facility] [address] [A41.9] 2022     Priority: Medium    Fall [W19. XXXA] 2022     Priority: Medium   HLD  [facility] course in brief:    45 y. o. male presented with right hip pain, found to have a closed right femoral neck fracture , he resides in a facility , and is non verbal but understands when asked questions.   He was also found to be COVID POS   s/p ORIF right femoral neck 2022       PHYSICAL EXAM:    /84   Pulse 80   Temp 96.8 °F (36 °C) (Temporal)   Resp 16   Ht 5' 10\" (1.778 m)   Wt 179 lb 14.3 oz (81.6 kg)   SpO2 94%   BMI 25.81 kg/m²     General appearance:  No apparent distress,   HEENT:  Normal cephalic, atraumatic   Respiratory:  Normal respiratory effort. Clear to auscultation,Cardiovascular:  Regular rate and rhythm   Abdomen: Soft, non-tender, non-distended with normal bowel sounds. Musculoskeletal:  No clubbing, cyanosis or edema bilaterally.  DSD INTACT RIGHT HIP   Skin: Skin color, texture, turgor normal.  No rashes or lesions.   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 Authorizing Provider   insulin glargine-yfgn (SEMGLEE-YFGN) 100 UNIT/ML injection vial Inject 12 Units into the skin 2 times daily 5/23/22  Yes Aisha Nieto,    insulin lispro (HUMALOG) 100 UNIT/ML SOLN injection vial Inject 0-10 Units into the skin 4 times daily (before meals and nightly) 5/23/22  Yes Sherrie Crawford,    atorvastatin (LIPITOR) 40 MG tablet Take 1 tablet by mouth nightly 5/23/22  Yes Sherrie Crawford DO   carvedilol (COREG) 25 MG tablet Take 1 tablet by mouth 2 times daily (with meals) 5/23/22  Yes Sherrie Crawford DO   amLODIPine (NORVASC) 10 MG tablet Take 1 tablet by mouth daily 5/24/22  Yes Sherrie Crawford DO   zinc sulfate (ZINCATE) 220 (50 Zn) MG capsule Take 1 capsule by mouth daily 5/24/22  Yes Sherrie Crawford DO   ascorbic acid (VITAMIN C) 500 MG tablet Take 1 tablet by mouth daily 5/24/22  Yes Sherrie Crawford,    Vitamin D (CHOLECALCIFEROL) 25 MCG (1000 UT) TABS tablet Take 1 tablet by mouth daily 5/24/22  Yes Aisha Nieto,    aspirin 325 MG EC tablet Take 1 tablet by mouth daily 5/20/22 6/19/22 Yes Alyx Diaz,    HYDROcodone-acetaminophen (NORCO) 5-325 MG per tablet Take 1 tablet by mouth every 4 hours as needed for Pain for up to 7 days. Intended supply: 7 days.  Take lowest dose possible to manage pain 5/20/22 5/27/22 Yes Alyx Diaz DO   escitalopram (LEXAPRO) 20 MG tablet Take 20 mg by mouth daily   Patient not taking: Reported on 6/11/2021 4/29/21   Historical Provider, MD       Consults:   [address] [place] [address] CONSULT TO INTERNAL MEDICINE  IP CONSULT TO ANESTHESIOLOGY  IP CONSULT TO SOCIAL WORK  IP CONSULT TO INFECTIOUS DISEASES  IP CONSULT TO CARDIOLOGY            Discharge Instructions / Follow up:    Future Appointments   Date Time Provider Nemo Rubio   6/10/2022  9:15 AM SCHEDULE, SE ORTHO RES SE [facility] Continued appropriate risk factor modification of blood pressure, diabetes and serum lipids will remain essential to reducing risk of future atherosclerotic development    Activity: activity as tolerated    Significant labs:  CBC:   Recent Labs     05/22/22  0829   WBC 7.9   RBC 4.67   HGB 12.5   HCT 37.3   MCV 79.9*   RDW 13.0        BMP:   Recent Labs     05/22/22  0829 05/23/22  0611    141   K 3.7 3.6    105   CO2 22 24   BUN 21* 18   CREATININE 0.9 0.8     LFT:  No results for input(s): PROT, ALB, ALKPHOS, ALT, AST, BILITOT, AMYLASE, LIPASE in the last 72 hours. PT/INR: No results for input(s): INR, APTT in the last 72 hours. BNP: No results for input(s): BNP in the last 72 hours. Hgb A1C:   Lab Results   Component Value Date    LABA1C 8.9 (H) 05/19/2022     Folate and B12: No results found for: AUDYVAOE70, No results found for: FOLATE  Thyroid Studies: No results found for: TSH, S1CWNZF, U6OMGAR, THYROIDAB    Urinalysis:    Lab Results   Component Value Date    NITRU Negative 05/18/2022    WBCUA NONE 05/18/2022    WBCUA 0-1 08/07/2011    BACTERIA NONE SEEN 05/18/2022    RBCUA NONE 05/18/2022    RBCUA 5-10 08/07/2011    BLOODU Negative 05/18/2022    SPECGRAV <=1.005 05/18/2022    GLUCOSEU 250 05/18/2022    GLUCOSEU >=1000 08/07/2011       Imaging:  XR FEMUR RIGHT (MIN 2 VIEWS)    Result Date: 5/19/2022  EXAMINATION: XRAY VIEWS OF THE RIGHT FEMUR 5/19/2022 1:23 am COMPARISON: None. HISTORY: ORDERING SYSTEM PROVIDED HISTORY: femoral neck fracture TECHNOLOGIST PROVIDED HISTORY: Reason for exam:->femoral neck fracture What reading provider will be dictating this exam?->CRC FINDINGS: There is no evidence of acute fracture. There is normal alignment. No acute joint abnormality. No focal osseous lesion. No focal soft tissue abnormality. No acute osseous abnormality. MRI would be the next best modality of choice to evaluate for fracture if clinically warranted.      XR KNEE RIGHT (1-2 VIEWS)    Result Date: 5/22/2022  EXAMINATION: TWO XRAY VIEWS OF THE RIGHT KNEE;   XRAY VIEWS OF THE RIGHT TIBIA AND FIBULA; THREE XRAY VIEWS OF THE RIGHT ANKLE 5/22/2022 1:14 pm COMPARISON: None. HISTORY: ORDERING SYSTEM PROVIDED HISTORY: right knee pain TECHNOLOGIST PROVIDED HISTORY: Reason for exam:->right knee pain What reading provider will be dictating this exam?->CRC FINDINGS: RIGHT KNEE: Radiographs of the right knee show no fracture with preserved alignment. Mild medial compartment joint space narrowing with mild tricompartmental degenerative spurring. Small right suprapatellar joint effusion. Osseous mineralization is decreased. RIGHT TIBIA/FIBULA: Radiographs of the proximal to mid right tibia/fibula demonstrate no fractures with preserved alignment. No abnormal periosteal elevation. Osseous mineralization is decreased. No soft tissue abnormality. RIGHT ANKLE: Radiographs of the right ankle show no fracture with preserved alignment. Chronic osseous protuberance off the medial malleolus. Osseous mineralization is decreased. Talar dome appears intact. Base of the 5th metatarsal appears intact. Anterior and posterior calcaneal enthesophytes. No significant soft tissue swelling or joint effusion. Arteriosclerosis. 1.  No acute osseous findings seen about the right knee, right tibia/fibula, nor right ankle on these exams. Overall normal alignment. 2.  Decreased osseous mineralization. Mild right knee osteoarthritis. Small right suprapatellar joint effusion. 3.  Calcaneal enthesophytes. Arteriosclerosis. Chronic osseous protuberance off the medial malleolus. XR TIBIA FIBULA RIGHT (2 VIEWS)    Result Date: 5/22/2022  EXAMINATION: TWO XRAY VIEWS OF THE RIGHT KNEE;   XRAY VIEWS OF THE RIGHT TIBIA AND FIBULA; THREE XRAY VIEWS OF THE RIGHT ANKLE 5/22/2022 1:14 pm COMPARISON: None.  HISTORY: ORDERING SYSTEM PROVIDED HISTORY: right knee pain TECHNOLOGIST PROVIDED HISTORY: Reason for exam:->right knee pain What reading provider will be dictating this exam?->CRC FINDINGS: RIGHT KNEE: Radiographs of the right knee show no fracture with preserved alignment. Mild medial compartment joint space narrowing with mild tricompartmental degenerative spurring. Small right suprapatellar joint effusion. Osseous mineralization is decreased. RIGHT TIBIA/FIBULA: Radiographs of the proximal to mid right tibia/fibula demonstrate no fractures with preserved alignment. No abnormal periosteal elevation. Osseous mineralization is decreased. No soft tissue abnormality. RIGHT ANKLE: Radiographs of the right ankle show no fracture with preserved alignment. Chronic osseous protuberance off the medial malleolus. Osseous mineralization is decreased. Talar dome appears intact. Base of the 5th metatarsal appears intact. Anterior and posterior calcaneal enthesophytes. No significant soft tissue swelling or joint effusion. Arteriosclerosis. 1.  No acute osseous findings seen about the right knee, right tibia/fibula, nor right ankle on these exams. Overall normal alignment. 2.  Decreased osseous mineralization. Mild right knee osteoarthritis. Small right suprapatellar joint effusion. 3.  Calcaneal enthesophytes. Arteriosclerosis. Chronic osseous protuberance off the medial malleolus. XR ANKLE RIGHT (MIN 3 VIEWS)    Result Date: 5/22/2022  EXAMINATION: TWO XRAY VIEWS OF THE RIGHT KNEE;   XRAY VIEWS OF THE RIGHT TIBIA AND FIBULA; THREE XRAY VIEWS OF THE RIGHT ANKLE 5/22/2022 1:14 pm COMPARISON: None. HISTORY: ORDERING SYSTEM PROVIDED HISTORY: right knee pain TECHNOLOGIST PROVIDED HISTORY: Reason for exam:->right knee pain What reading provider will be dictating this exam?->CRC FINDINGS: RIGHT KNEE: Radiographs of the right knee show no fracture with preserved alignment. Mild medial compartment joint space narrowing with mild tricompartmental degenerative spurring. Small right suprapatellar joint effusion.   Osseous mineralization is decreased. RIGHT TIBIA/FIBULA: Radiographs of the proximal to mid right tibia/fibula demonstrate no fractures with preserved alignment. No abnormal periosteal elevation. Osseous mineralization is decreased. No soft tissue abnormality. RIGHT ANKLE: Radiographs of the right ankle show no fracture with preserved alignment. Chronic osseous protuberance off the medial malleolus. Osseous mineralization is decreased. Talar dome appears intact. Base of the 5th metatarsal appears intact. Anterior and posterior calcaneal enthesophytes. No significant soft tissue swelling or joint effusion. Arteriosclerosis. 1.  No acute osseous findings seen about the right knee, right tibia/fibula, nor right ankle on these exams. Overall normal alignment. 2.  Decreased osseous mineralization. Mild right knee osteoarthritis. Small right suprapatellar joint effusion. 3.  Calcaneal enthesophytes. Arteriosclerosis. Chronic osseous protuberance off the medial malleolus. CT Head WO Contrast    Result Date: 5/18/2022  EXAMINATION: CT OF THE HEAD WITHOUT CONTRAST  5/18/2022 10:36 pm TECHNIQUE: CT of the head was performed without the administration of intravenous contrast. Automated exposure control, iterative reconstruction, and/or weight based adjustment of the mA/kV was utilized to reduce the radiation dose to as low as reasonably achievable. COMPARISON: None. 24 May 2021 HISTORY: ORDERING SYSTEM PROVIDED HISTORY: fall, pain TECHNOLOGIST PROVIDED HISTORY: Reason for exam:->fall, pain Has a \"code stroke\" or \"stroke alert\" been called? ->No Decision Support Exception - unselect if not a suspected or confirmed emergency medical condition->Emergency Medical Condition (MA) What reading provider will be dictating this exam?->CRC FINDINGS: BRAIN/VENTRICLES: There is no acute intracranial hemorrhage, mass effect or midline shift. No abnormal extra-axial fluid collection.   The gray-white differentiation is maintained without evidence of an acute infarct. There is prominence of the ventricles and sulci due to global parenchymal volume loss. There are nonspecific areas of hypoattenuation within the periventricular and subcortical white matter, which likely represent chronic microvascular ischemic change. There is again extensive encephalomalacia is seen in the left cerebral hemisphere consistent with an old left MCA distribution infarct. There is also encephalomalacia in left cerebellum consistent with an old infarct. ORBITS: The visualized portion of the orbits demonstrate no acute abnormality. SINUSES: The visualized paranasal sinuses and mastoid air cells demonstrate no acute abnormality. SOFT TISSUES/SKULL: No acute abnormality of the visualized skull or soft tissues. No acute intracranial abnormality. 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 Chronic infarct within the left cerebellum. CT SOFT TISSUE NECK W CONTRAST    Result Date: 5/19/2022  EXAMINATION: CT OF THE NECK SOFT TISSUE WITH CONTRAST  5/19/2022 TECHNIQUE: CT of the neck was performed with the administration of intravenous contrast. Multiplanar reformatted images are provided for review. Automated exposure control, iterative reconstruction, and/or weight based adjustment of the mA/kV was utilized to reduce the radiation dose to as low as reasonably achievable. COMPARISON: None. HISTORY: ORDERING SYSTEM PROVIDED HISTORY: left sided face/neck swelling and pain - include through maxilla TECHNOLOGIST PROVIDED HISTORY: Reason for exam:->left sided face/neck swelling and pain - include through maxilla Decision Support Exception - unselect if not a suspected or confirmed emergency medical condition->Emergency Medical Condition (MA) What reading provider will be dictating this exam?->CRC FINDINGS: PHARYNX/LARYNX:  The palatine tonsils are normal in appearance. The tongue is normal in appearance.   The valleculae, epiglottis, aryepiglottic folds and pyriform sinuses appear unremarkable. The true and false vocal cords are normal in appearance. No mass or abscess is seen. SALIVARY GLANDS/THYROID:  The parotid and submandibular glands appear unremarkable. The thyroid gland appears unremarkable. LYMPH NODES:  No cervical or supraclavicular lymphadenopathy is seen. SOFT TISSUES:  Mild stranding changes are identified within the posterior soft tissues of the neck in the midline likely representing old inflammation/infection. No acute process is appreciated within the left side of the face or neck. BRAIN/ORBITS/SINUSES:  Large old left MCA infarct is identified. Mild mucosal thickening is identified within right maxillary sinus and ethmoid air cells. LUNG APICES/SUPERIOR MEDIASTINUM:  No focal consolidation is seen within the visualized lung apices. No superior mediastinal lymphadenopathy or mass. The visualized portion of the trachea appears unremarkable. BONES:  No aggressive appearing lytic or blastic bony lesion. Stranding changes within the posterior subcutaneous tissues of the neck in the midline likely representing old inflammation/infection. No significant left-sided neck swelling or inflammation. No abscess collection. Large old left MCA infarct. CT Cervical Spine WO Contrast    Result Date: 5/18/2022  EXAMINATION: CT OF THE CERVICAL SPINE WITHOUT CONTRAST 5/18/2022 10:36 pm TECHNIQUE: CT of the cervical spine was performed without the administration of intravenous contrast. Multiplanar reformatted images are provided for review. Automated exposure control, iterative reconstruction, and/or weight based adjustment of the mA/kV was utilized to reduce the radiation dose to as low as reasonably achievable.  COMPARISON: 05/24/2021 HISTORY: ORDERING SYSTEM PROVIDED HISTORY: fall, pain TECHNOLOGIST PROVIDED HISTORY: Reason for exam:->fall, pain Decision Support Exception - unselect if not a suspected or confirmed emergency medical condition->Emergency Medical Condition (MA) What reading provider will be dictating this exam?->CRC FINDINGS: BONES/ALIGNMENT: There is no acute fracture or traumatic malalignment. DEGENERATIVE CHANGES: There is multilevel discogenic change present without evidence of acute fracture or subluxation. There are again nonspecific osseous lucencies without aggressive features. SOFT TISSUES: There is no prevertebral soft tissue swelling. No acute abnormality of the cervical spine. CT ABDOMEN PELVIS W IV CONTRAST Additional Contrast? None    Result Date: 5/18/2022  EXAMINATION: CT OF THE ABDOMEN AND PELVIS WITH CONTRAST 5/18/2022 10:36 pm TECHNIQUE: CT of the abdomen and pelvis was performed with the administration of intravenous contrast. Multiplanar reformatted images are provided for review. Automated exposure control, iterative reconstruction, and/or weight based adjustment of the mA/kV was utilized to reduce the radiation dose to as low as reasonably achievable. COMPARISON: None. HISTORY: ORDERING SYSTEM PROVIDED HISTORY: fall, pain TECHNOLOGIST PROVIDED HISTORY: Additional Contrast?->None Reason for exam:->fall, pain Decision Support Exception - unselect if not a suspected or confirmed emergency medical condition->Emergency Medical Condition (MA) What reading provider will be dictating this exam?->CRC FINDINGS: Lower Chest: Visualized lungs, heart and pericardium are normal. Organs: The liver, spleen, adrenal glands, kidneys, pancreas and gallbladder are normal. GI/Bowel: Normal large and small bowel and appendix. Pelvis: Normal urinary bladder. Prostatomegaly. Peritoneum/Retroperitoneum: No free fluid or free air. Bones/Soft Tissues: High suspicion for right femoral neck fracture. High suspicion for right femoral neck fracture.      XR CHEST PORTABLE    Result Date: 5/18/2022  EXAMINATION: ONE XRAY VIEW OF THE CHEST 5/18/2022 8:18 pm COMPARISON: May 24, 2021 HISTORY: ORDERING SYSTEM PROVIDED HISTORY: concern for pneumonia TECHNOLOGIST PROVIDED HISTORY: Reason for exam:->concern for pneumonia What reading provider will be dictating this exam?->CRC FINDINGS: No airspace opacity or pleural effusion. Minimal subsegmental atelectasis in left lung base. The heart is normal size. No pneumothorax. No free air beneath the hemidiaphragms. No pneumonia or pleural effusion. FLUORO FOR SURGICAL PROCEDURES    Result Date: 5/20/2022  EXAMINATION: SPOT FLUOROSCOPIC IMAGES 5/20/2022 4:11 pm TECHNIQUE: Fluoroscopy was provided by the radiology department for procedure. Radiologist was not present during examination. FLUOROSCOPY DOSE AND TYPE OR TIME AND EXPOSURES: 2 images FLUOROSCOPY TIME: 69.8 seconds COMPARISON: Hip series from May 19, 2022 HISTORY: ORDERING SYSTEM PROVIDED HISTORY: Rt. hip ORIF TECHNOLOGIST PROVIDED HISTORY: Reason for exam:->Rt. hip ORIF What reading provider will be dictating this exam?->CRC Intraprocedural imaging. FINDINGS: 2 spot images of the right hip were obtained. Fluoroscopic support provided to subspecialty service for ORIF of the right hip. No obvious complication on the images provided. Please see subspecialty report for full details and interpretation of real time imaging. Intraprocedural fluoroscopic spot images as above. See separate procedure report for more information. XR HIP 2-3 VW W PELVIS RIGHT    Result Date: 5/20/2022  EXAMINATION: ONE XRAY VIEW OF THE PELVIS AND TWO XRAY VIEWS RIGHT HIP 5/20/2022 7:43 pm COMPARISON: Hip series from May 19, 2022 HISTORY: ORDERING SYSTEM PROVIDED HISTORY: post op TECHNOLOGIST PROVIDED HISTORY: Reason for exam:->post op What reading provider will be dictating this exam?->CRC FINDINGS: Interval ORIF of the right hip. Postprocedural changes noted. A Saha catheter overlies the pelvis. Moderate degree of bilateral hip joint osteoarthritis. Postprocedural changes to the right hip. No complicating features.      XR HIP 2-3 VW W PELVIS RIGHT    Result Date: 5/19/2022  EXAMINATION: ONE XRAY VIEW OF THE PELVIS AND TWO XRAY VIEWS RIGHT HIP 5/19/2022 1:24 am COMPARISON: None. HISTORY: ORDERING SYSTEM PROVIDED HISTORY: right hip fracture TECHNOLOGIST PROVIDED HISTORY: Reason for exam:->right hip fracture What reading provider will be dictating this exam?->CRC FINDINGS: The hip demonstrates normal alignment. No evidence of acute fracture. No focal osseus lesion. Pelvis is intact. Degenerative changes involving both hip joints. No acute abnormality of the hip. Discharge Medications:      Medication List      START taking these medications    ascorbic acid 500 MG tablet  Commonly known as: VITAMIN C  Take 1 tablet by mouth daily     HYDROcodone-acetaminophen 5-325 MG per tablet  Commonly known as: Norco  Take 1 tablet by mouth every 4 hours as needed for Pain for up to 7 days. Intended supply: 7 days. Take lowest dose possible to manage pain     insulin glargine-yfgn 100 UNIT/ML injection vial  Commonly known as: SEMGLEE-YFGN  Inject 12 Units into the skin 2 times daily     insulin lispro 100 UNIT/ML Soln injection vial  Commonly known as: HUMALOG  Inject 0-10 Units into the skin 4 times daily (before meals and nightly)     Vitamin D 25 MCG (1000 UT) Tabs tablet  Commonly known as: CHOLECALCIFEROL  Take 1 tablet by mouth daily     zinc sulfate 220 (50 Zn) MG capsule  Commonly known as: ZINCATE  Take 1 capsule by mouth daily        CHANGE how you take these medications    aspirin 325 MG EC tablet  Take 1 tablet by mouth daily  What changed:   · medication strength  · how much to take  · Another medication with the same name was removed. Continue taking this medication, and follow the directions you see here.      atorvastatin 40 MG tablet  Commonly known as: LIPITOR  Take 1 tablet by mouth nightly  What changed: when to take this     carvedilol 25 MG tablet  Commonly known as: COREG  Take 1 tablet by mouth 2 times daily (with meals)  What changed: when to take this        CONTINUE taking these medications    amLODIPine 10 MG tablet  Commonly known as: NORVASC  Take 1 tablet by mouth daily     escitalopram 20 MG tablet  Commonly known as: LEXAPRO        STOP taking these medications    levETIRAcetam 500 MG tablet  Commonly known as: KEPPRA     losartan-hydroCHLOROthiazide 100-12.5 MG per tablet  Commonly known as: HYZAAR     metFORMIN 1000 MG tablet  Commonly known as: GLUCOPHAGE     Tresiba FlexTouch 100 UNIT/ML Sopn  Generic drug: Insulin Degludec           Where to Get Your Medications      You can get these medications from any pharmacy    Bring a paper prescription for each of these medications  · aspirin 325 MG EC tablet  · HYDROcodone-acetaminophen 5-325 MG per tablet     Information about where to get these medications is not yet available    Ask your nurse or doctor about these medications  · amLODIPine 10 MG tablet  · ascorbic acid 500 MG tablet  · atorvastatin 40 MG tablet  · carvedilol 25 MG tablet  · insulin glargine-yfgn 100 UNIT/ML injection vial  · insulin lispro 100 UNIT/ML Soln injection vial  · Vitamin D 25 MCG (1000 UT) Tabs tablet  · zinc sulfate 220 (50 Zn) MG capsule         Time Spent on discharge is more than 45 minutes in the examination, evaluation, counseling and review of medications and discharge plan.    +++++++++++++++++++++++++++++++++++++++++++++++++  Mikey Linder, DO  [address], [place]  +++++++++++++++++++++++++++++++++++++++++++++++++  NOTE: This report was transcribed using voice recognition software. Every effort was made to ensure accuracy; however, inadvertent computerized transcription errors may be present.

## 2022-06-03 DIAGNOSIS — S62.667D CLOSED NONDISPLACED FRACTURE OF DISTAL PHALANX OF LEFT LITTLE FINGER WITH ROUTINE HEALING, SUBSEQUENT ENCOUNTER: Primary | ICD-10-CM

## 2022-06-10 ENCOUNTER — TELEPHONE (OUTPATIENT)
Dept: ORTHOPEDIC SURGERY | Age: 52
End: 2022-06-10

## 2022-06-10 NOTE — TELEPHONE ENCOUNTER
Ronnie espana from [facility] calling to reschedule post op appointment- patient does not have transportation. Please contact her to reschedule.  Thank you

## 2022-06-10 NOTE — TELEPHONE ENCOUNTER
Call to [facility] r/s'd appt   Future Appointments   Date Time Provider Nemo Rubio   6/13/2022 12:00 PM SCHEDULE, SE ORTHO APC [facility]     She will call transport asap.

## 2022-06-13 NOTE — TELEPHONE ENCOUNTER
Second time Facility has not had transport for patient. Date of Procedure: 5/20/2022  Procedure(s):  HIP OPEN REDUCTION INTERNAL FIXATION  Surgeon(s):  Sherrill Zepeda MD    Routed to Providers for consideration and scheduling recommendations.

## 2022-06-13 NOTE — TELEPHONE ENCOUNTER
Britney Degroot from [facility] calling bc they do not have transport and will need to reschedule the patients post op appointment. Please contact her to reschedule.

## 2022-06-14 NOTE — TELEPHONE ENCOUNTER
Call placed to [facility], asked for ext [phone] spoke with Mian Boyer, informed her that if stitches/sutures look good and are ready to be removed they can take them out and appointment rescheduled. She stated they could not do this week as transport is very scarce. Future Appointments   Date Time Provider Nemo Rubio   6/21/2022  8:45 AM Tommy Rodriguez MD [facility]       Directions to office provided and patient verbalized understanding and is agreeable with plan.
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
## 2022-06-21 ENCOUNTER — HOSPITAL ENCOUNTER (OUTPATIENT)
Dept: GENERAL RADIOLOGY | Age: 52
Discharge: HOME | End: 2022-06-23
Payer: MEDICAID

## 2022-06-21 ENCOUNTER — OFFICE VISIT (OUTPATIENT)
Dept: ORTHOPEDIC SURGERY | Age: 52
End: 2022-06-21
Payer: MEDICAID

## 2022-06-21 DIAGNOSIS — S62.667D CLOSED NONDISPLACED FRACTURE OF DISTAL PHALANX OF LEFT LITTLE FINGER WITH ROUTINE HEALING, SUBSEQUENT ENCOUNTER: ICD-10-CM

## 2022-06-21 DIAGNOSIS — M54.16 LUMBAR RADICULOPATHY: ICD-10-CM

## 2022-06-21 DIAGNOSIS — S72.001A CLOSED DISPLACED FRACTURE OF RIGHT FEMORAL NECK (HCC): Primary | ICD-10-CM

## 2022-06-21 PROCEDURE — 99212 OFFICE O/P EST SF 10 MIN: CPT

## 2022-06-21 PROCEDURE — 99024 POSTOP FOLLOW-UP VISIT: CPT | Performed by: ORTHOPAEDIC SURGERY

## 2022-06-21 PROCEDURE — 73502 X-RAY EXAM HIP UNI 2-3 VIEWS: CPT

## 2022-06-21 RX ORDER — ALUMINA, MAGNESIA, AND SIMETHICONE 2400; 2400; 240 MG/30ML; MG/30ML; MG/30ML
30 SUSPENSION ORAL EVERY 6 HOURS PRN
COMMUNITY

## 2022-06-21 RX ORDER — BISACODYL 10 MG
10 SUPPOSITORY, RECTAL RECTAL DAILY
COMMUNITY

## 2022-06-21 RX ORDER — ACETAMINOPHEN 325 MG/1
650 TABLET ORAL EVERY 4 HOURS PRN
COMMUNITY

## 2022-06-21 RX ORDER — INSULIN DEGLUDEC INJECTION 100 U/ML
INJECTION, SOLUTION SUBCUTANEOUS
COMMUNITY

## 2022-06-21 RX ORDER — POLYETHYLENE GLYCOL 3350 17 G/17G
17 POWDER, FOR SOLUTION ORAL DAILY
COMMUNITY

## 2022-06-21 RX ORDER — SODIUM PHOSPHATE, DIBASIC AND SODIUM PHOSPHATE, MONOBASIC 7; 19 G/133ML; G/133ML
1 ENEMA RECTAL
COMMUNITY

## 2022-06-21 NOTE — PATIENT INSTRUCTIONS
Weightbearing as tolerated right lower extremity. Continue PT at the facility. Patient will be set up for pain management for possible further work-up/treatment of his back    Follow-up in 6 weeks for likely final reevaluation and x-rays. Call if any questions or concerns.

## 2022-06-21 NOTE — PROGRESS NOTES
Chief Complaint   Patient presents with    Post-Op Check     4 wk post op ORIF right femoral neck 5/20/2022; TTS. Patient still having pain in the leg. Patient resides at [facility]. Unknown if patient receiving PT at facility. [facility] Other      # [id number]        OP:SURGEON: Dr. Yaya Snyder MD  DATE OF PROCEDURE: 5-20-22  PROCEDURE: HIP OPEN REDUCTION INTERNAL FIXATION     POD: 4.5 weeks    Subjective:  Sara Seth is following up from the above surgery. He is WBAT on right lower extremity. Pain to extremity is moderate and is not taking prescribed pain medication. They denies numbness or tingling to the right lower extremity. Denies calf pain, chest pain, or shortness of breath. Patient continues to use DVT prophylaxis,  mg BID. Patient is  participating in therapy at the facility. Patient is nonverbal.  He presents today with his family member. Communication is via iPad . His family member states that he had a stroke with right-sided weakness in 2015. She states that he really has not walked since the stroke and has been mostly confined to a wheelchair. Patient denies right hip pain but does complain of diffuse radiating pain down the right leg. He does complain of intermittent low back pain. Denies symptoms radiating down the left leg. Denies saddle anesthesias, bladder or bowel issues. Review of Systems -  All pertinent positives/negative in HPI     Objective:    General: Alert and oriented X 3, normocephalic atraumatic, external ears and eye normal, sclera clear, no acute distress, respirations easy and unlabored with no audible wheezes, skin warm and dry, speech and dress appropriate for noted age, affect euthymic.     Extremity:  Right Lower Extremity  Skin clean dry and intact, without signs of infection   Incision well-healed  no edema noted  Compartments supple throughout thigh and leg  Calf supple and not tender  negative Homans  Patient is unable to move his right arm or right leg from prior CVA. Presents in a wheelchair. States sensation intact to touch in sural, deep peroneal, superficial peroneal, saphenous, posterior tibial  nerve distributions to foot/ankle. Palpable dorsalis pedis and posterior tibialis pulses, cap refill brisk in toes, foot warm/perfused. There were no vitals taken for this visit. XR:   Pelvis and right hip films demonstrate ORIF right hip fracture with the hardware in stable position and alignment. No evidence of hardware loosening or failure. Assessment:   Diagnosis Orders   1. Closed displaced fracture of right femoral neck ([address]     2. Lumbar radiculopathy       Plan:  X-rays reviewed and discussed. Patient reviewed and discussed with Dr. Koki Farrell as tolerated right lower extremity. Continue PT at the facility. Patient will be set up for pain management for possible further work-up/treatment of his back    Follow-up in 6 weeks for likely final reevaluation and x-rays. Call if any questions or concerns. Electronically signed by JOSE CRUZ Jarrell on 6/21/2022 at 10:07 AM  Note: This report was completed using [employer] voiced recognition software. Every effort has been made to ensure accuracy; however, inadvertent computerized transcription errors may be present.

## 2022-07-15 ENCOUNTER — APPOINTMENT (OUTPATIENT)
Dept: GENERAL RADIOLOGY | Age: 52
End: 2022-07-15
Payer: MEDICAID

## 2022-07-15 ENCOUNTER — APPOINTMENT (OUTPATIENT)
Dept: CT IMAGING | Age: 52
End: 2022-07-15
Payer: MEDICAID

## 2022-07-15 ENCOUNTER — HOSPITAL ENCOUNTER (EMERGENCY)
Age: 52
Discharge: NURSING, REHAB OR LONG-TERM CARE | End: 2022-07-15
Attending: STUDENT IN AN ORGANIZED HEALTH CARE EDUCATION/TRAINING PROGRAM
Payer: MEDICAID

## 2022-07-15 VITALS
DIASTOLIC BLOOD PRESSURE: 76 MMHG | HEART RATE: 61 BPM | BODY MASS INDEX: 25.68 KG/M2 | RESPIRATION RATE: 18 BRPM | OXYGEN SATURATION: 95 % | SYSTOLIC BLOOD PRESSURE: 117 MMHG | TEMPERATURE: 97 F | WEIGHT: 179 LBS

## 2022-07-15 DIAGNOSIS — E04.1 THYROID NODULE: ICD-10-CM

## 2022-07-15 DIAGNOSIS — M25.559 HIP PAIN: ICD-10-CM
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
## 2022-07-15 LAB
BACTERIA: ABNORMAL /HPF
BILIRUBIN URINE: NEGATIVE
BLOOD, URINE: NEGATIVE
CLARITY: CLEAR
COLOR: YELLOW
GLUCOSE URINE: NEGATIVE MG/DL
KETONES, URINE: NEGATIVE MG/DL
LEUKOCYTE ESTERASE, URINE: NEGATIVE
NITRITE, URINE: NEGATIVE
PH UA: 6 (ref 5–9)
PROTEIN UA: NEGATIVE MG/DL
RBC UA: ABNORMAL /HPF (ref 0–2)
SPECIFIC GRAVITY UA: 1.01 (ref 1–1.03)
UROBILINOGEN, URINE: 0.2 E.U./DL
WBC UA: ABNORMAL /HPF (ref 0–5)

## 2022-07-15 PROCEDURE — 81001 URINALYSIS AUTO W/SCOPE: CPT

## 2022-07-15 PROCEDURE — 72125 CT NECK SPINE W/O DYE: CPT

## 2022-07-15 PROCEDURE — 51701 INSERT BLADDER CATHETER: CPT

## 2022-07-15 PROCEDURE — 73502 X-RAY EXAM HIP UNI 2-3 VIEWS: CPT

## 2022-07-15 PROCEDURE — 99284 EMERGENCY DEPT VISIT MOD MDM: CPT

## 2022-07-15 PROCEDURE — 71045 X-RAY EXAM CHEST 1 VIEW: CPT

## 2022-07-15 PROCEDURE — 70450 CT HEAD/BRAIN W/O DYE: CPT

## 2022-07-15 NOTE — ED PROVIDER NOTES
Marissa Gunderson is a 45year old male who presented to the ED after being sent in from nursing home. Patient is unable to speak fluently due to hx of CVA which is his baseline, patient has medicated with staff that he was having right hip pain. Patient does have a history of a fall and a previous femur fracture. Patient had tried to escape from his facility with his wheelchair for several days. Patient was found at [facility]. Patient became combative with staff and was brought to emergency department for evaluation. HPI and ROS are  limited due to patient's previous CVA patient did communicate he was having right hip and leg pain by pointing to his right hip and leg and shaking his head yes when asked if he was having pain. Patient cannot describe the pain. Patient declined to write down his symptoms. Imaging was ordered on patient anyway to evaluate for possible infection. HPI and ROS limited due to previous CVA. The history is provided by the patient, medical records, the EMS personnel and the nursing home. Review of Systems   Unable to perform ROS: Other (CVA dysarthria)      Physical Exam  Vitals and nursing note reviewed. Constitutional:       General: He is not in acute distress. Appearance: He is ill-appearing. Comments: Chronically ill in appearance   HENT:      Head: Normocephalic and atraumatic. Eyes:      General: No scleral icterus. Conjunctiva/sclera: Conjunctivae normal.      Pupils: Pupils are equal, round, and reactive to light. Cardiovascular:      Rate and Rhythm: Normal rate and regular rhythm. Pulmonary:      Effort: Pulmonary effort is normal.      Breath sounds: Normal breath sounds. Abdominal:      General: Bowel sounds are normal. There is no distension. Palpations: Abdomen is soft. Tenderness: There is no abdominal tenderness. There is no guarding or rebound. Musculoskeletal:         General: Tenderness present.       Cervical back: Normal range of motion and neck supple. No rigidity. No muscular tenderness. Right lower leg: No edema. Left lower leg: No edema. Comments: Right hip tenderness   Skin:     General: Skin is warm and dry. Capillary Refill: Capillary refill takes less than 2 seconds. Coloration: Skin is not pale. Findings: No erythema or rash. Neurological:      Mental Status: He is alert. Comments: Patient has chronic deficits from previous CVA. Unable to fully evaluate due to patient's previous CVA. Per EMS report and nursing home patient does not have any new deficits   Psychiatric:      Comments: Unable to fully evaluate due to acuity of condition        Procedures     MDM  Number of Diagnoses or Management Options  Fall, initial encounter  Hip pain  Thyroid nodule  Diagnosis management comments: Racheal Heard is a 40-year-old male present emergency department concern for hip pain and behavioral issue. Patient stable in emergency department patient's not found have acute issue contributing to patient's symptoms. Patient would shake his head and denies SI or HI. Patient denies additional trauma. Patient stable for discharge back to facility. Case was discussed with nursing home who wanted patient evaluated for the hip pain and is fine with patient returning back to facility. Patient is not any acute distress at time of reevaluation. Patient shakes head yes when asked if he is okay with return to the facility. Patient stable on emergency department  Patient advised to follow up with ortho for hip pain              --------------------------------------------- PAST HISTORY ---------------------------------------------  Past Medical History:  has a past medical history of Cerebral artery occlusion with cerebral infarction ([facility] [facility] Hypertension, and Seizures ([facility]     Past Surgical History:  has a past surgical history that includes Hip fracture surgery (Right, 5/20/2022). Social History:  reports that he has never smoked. He has never used smokeless tobacco. He reports that he does not currently use drugs. He reports that he does not drink alcohol. Family History: family history is not on file. The patients home medications have been reviewed. Allergies: Patient has no known allergies. -------------------------------------------------- RESULTS -------------------------------------------------  Labs:  Results for orders placed or performed during the hospital encounter of 07/15/22   Urinalysis with Microscopic   Result Value Ref Range    Color, UA Yellow Straw/Yellow    Clarity, UA Clear Clear    Glucose, Ur Negative Negative mg/dL    Bilirubin Urine Negative Negative    Ketones, Urine Negative Negative mg/dL    Specific Gravity, UA 1.015 1.005 - 1.030    Blood, Urine Negative Negative    pH, UA 6.0 5.0 - 9.0    Protein, UA Negative Negative mg/dL    Urobilinogen, Urine 0.2 <2.0 E.U./dL    Nitrite, Urine Negative Negative    Leukocyte Esterase, Urine Negative Negative    WBC, UA 1-3 0 - 5 /HPF    RBC, UA 1-3 0 - 2 /HPF    Bacteria, UA RARE (A) None Seen /HPF       Radiology:  CT HEAD WO CONTRAST   Final Result   No acute intracranial abnormality. Redemonstration of old left MCA infarct and focal encephalomalacia in the   left cerebellum. CT CERVICAL SPINE WO CONTRAST   Final Result   No acute abnormality of the cervical spine. Multilevel degenerative changes. 1.4 cm right thyroid nodule. Follow-up with non emergent thyroid sonogram   recommended. XR HIP 2-3 VW W PELVIS RIGHT   Final Result   No acute bony abnormalities or hardware complications. XR CHEST PORTABLE   Final Result   No acute process.              ------------------------- NURSING NOTES AND VITALS REVIEWED ---------------------------  Date / Time Roomed:  7/15/2022  1:03 PM  ED Bed Assignment:  TATE PADGETT/JALIL    The nursing notes within the ED encounter and vital signs as below have been reviewed. /76   Pulse 61   Temp 97 °F (36.1 °C)   Resp 18   Wt 179 lb (81.2 kg)   SpO2 95%   BMI 25.68 kg/m²   Oxygen Saturation Interpretation: Normal      ------------------------------------------ PROGRESS NOTES ------------------------------------------  10:40 AM EDT  I have spoken with the patient and discussed todays results, in addition to providing specific details for the plan of care and counseling regarding the diagnosis and prognosis. Their questions are answered at this time and they are agreeable with the plan. I discussed at length with them reasons for immediate return here for re evaluation. They will followup with their primary care physician by calling their office tomorrow. --------------------------------- ADDITIONAL PROVIDER NOTES ---------------------------------  At this time the patient is without objective evidence of an acute process requiring hospitalization or inpatient management. They have remained hemodynamically stable throughout their entire ED visit and are stable for discharge with outpatient follow-up. The plan has been discussed in detail and they are aware of the specific conditions for emergent return, as well as the importance of follow-up. Discharge Medication List as of 7/15/2022  6:57 PM          Diagnosis:  1. Fall, initial encounter    2. Thyroid nodule    3. Hip pain        Disposition:  Patient's disposition: Discharge to home  Patient's condition is stable.              Kemi Barnes MD  07/17/22 4774

## 2022-07-18 ENCOUNTER — APPOINTMENT (OUTPATIENT)
Dept: GENERAL RADIOLOGY | Age: 52
End: 2022-07-18
Payer: MEDICAID

## 2022-07-18 ENCOUNTER — HOSPITAL ENCOUNTER (EMERGENCY)
Age: 52
Discharge: NURSING, REHAB OR LONG-TERM CARE | End: 2022-07-20
Attending: EMERGENCY MEDICINE
Payer: MEDICAID

## 2022-07-18 DIAGNOSIS — R45.1 AGITATION: Primary | ICD-10-CM

## 2022-07-18 LAB
ACETAMINOPHEN LEVEL: <5 MCG/ML (ref 10–30)
ALBUMIN SERPL-MCNC: 4.5 G/DL (ref 3.5–5.2)
ALP BLD-CCNC: 124 U/L (ref 40–129)
ALT SERPL-CCNC: 25 U/L (ref 0–40)
ANION GAP SERPL CALCULATED.3IONS-SCNC: 14 MMOL/L (ref 7–16)
AST SERPL-CCNC: 16 U/L (ref 0–39)
BASOPHILS ABSOLUTE: 0.03 E9/L (ref 0–0.2)
BASOPHILS RELATIVE PERCENT: 0.4 % (ref 0–2)
BILIRUB SERPL-MCNC: 0.3 MG/DL (ref 0–1.2)
BUN BLDV-MCNC: 14 MG/DL (ref 6–20)
CALCIUM SERPL-MCNC: 9.6 MG/DL (ref 8.6–10.2)
CHLORIDE BLD-SCNC: 101 MMOL/L (ref 98–107)
CO2: 23 MMOL/L (ref 22–29)
CREAT SERPL-MCNC: 1 MG/DL (ref 0.7–1.2)
EOSINOPHILS ABSOLUTE: 0.16 E9/L (ref 0.05–0.5)
EOSINOPHILS RELATIVE PERCENT: 2.4 % (ref 0–6)
ETHANOL: <10 MG/DL (ref 0–0.08)
GFR AFRICAN AMERICAN: >60
GFR NON-AFRICAN AMERICAN: >60 ML/MIN/1.73
GLUCOSE BLD-MCNC: 251 MG/DL (ref 74–99)
HCT VFR BLD CALC: 42 % (ref 37–54)
HEMOGLOBIN: 14.2 G/DL (ref 12.5–16.5)
IMMATURE GRANULOCYTES #: 0.02 E9/L
IMMATURE GRANULOCYTES %: 0.3 % (ref 0–5)
LYMPHOCYTES ABSOLUTE: 1.76 E9/L (ref 1.5–4)
LYMPHOCYTES RELATIVE PERCENT: 26 % (ref 20–42)
MCH RBC QN AUTO: 26.6 PG (ref 26–35)
MCHC RBC AUTO-ENTMCNC: 33.8 % (ref 32–34.5)
MCV RBC AUTO: 78.8 FL (ref 80–99.9)
MONOCYTES ABSOLUTE: 0.52 E9/L (ref 0.1–0.95)
MONOCYTES RELATIVE PERCENT: 7.7 % (ref 2–12)
NEUTROPHILS ABSOLUTE: 4.27 E9/L (ref 1.8–7.3)
NEUTROPHILS RELATIVE PERCENT: 63.2 % (ref 43–80)
PDW BLD-RTO: 13.3 FL (ref 11.5–15)
PLATELET # BLD: 283 E9/L (ref 130–450)
PMV BLD AUTO: 9.5 FL (ref 7–12)
POTASSIUM SERPL-SCNC: 4.1 MMOL/L (ref 3.5–5)
RBC # BLD: 5.33 E12/L (ref 3.8–5.8)
SALICYLATE, SERUM: 0.7 MG/DL (ref 0–30)
SODIUM BLD-SCNC: 138 MMOL/L (ref 132–146)
TOTAL PROTEIN: 7.9 G/DL (ref 6.4–8.3)
TRICYCLIC ANTIDEPRESSANTS SCREEN SERUM: NEGATIVE NG/ML
TROPONIN, HIGH SENSITIVITY: 17 NG/L (ref 0–11)
WBC # BLD: 6.8 E9/L (ref 4.5–11.5)

## 2022-07-18 PROCEDURE — 80053 COMPREHEN METABOLIC PANEL: CPT

## 2022-07-18 PROCEDURE — 99285 EMERGENCY DEPT VISIT HI MDM: CPT

## 2022-07-18 PROCEDURE — 80143 DRUG ASSAY ACETAMINOPHEN: CPT

## 2022-07-18 PROCEDURE — 80307 DRUG TEST PRSMV CHEM ANLYZR: CPT

## 2022-07-18 PROCEDURE — 6360000002 HC RX W HCPCS: Performed by: EMERGENCY MEDICINE

## 2022-07-18 PROCEDURE — 96372 THER/PROPH/DIAG INJ SC/IM: CPT

## 2022-07-18 PROCEDURE — 6360000002 HC RX W HCPCS

## 2022-07-18 PROCEDURE — 85025 COMPLETE CBC W/AUTO DIFF WBC: CPT

## 2022-07-18 PROCEDURE — 36415 COLL VENOUS BLD VENIPUNCTURE: CPT

## 2022-07-18 PROCEDURE — 93005 ELECTROCARDIOGRAM TRACING: CPT | Performed by: EMERGENCY MEDICINE

## 2022-07-18 PROCEDURE — 82077 ASSAY SPEC XCP UR&BREATH IA: CPT

## 2022-07-18 PROCEDURE — 71045 X-RAY EXAM CHEST 1 VIEW: CPT

## 2022-07-18 PROCEDURE — 80179 DRUG ASSAY SALICYLATE: CPT

## 2022-07-18 PROCEDURE — 84484 ASSAY OF TROPONIN QUANT: CPT

## 2022-07-18 RX ORDER — MIDAZOLAM HYDROCHLORIDE 2 MG/2ML
4 INJECTION, SOLUTION INTRAMUSCULAR; INTRAVENOUS ONCE
Status: COMPLETED | OUTPATIENT
Start: 2022-07-18 | End: 2022-07-18

## 2022-07-18 RX ORDER — MIDAZOLAM HYDROCHLORIDE 1 MG/ML
INJECTION INTRAMUSCULAR; INTRAVENOUS
Status: COMPLETED
Start: 2022-07-18 | End: 2022-07-18

## 2022-07-18 RX ORDER — ZIPRASIDONE MESYLATE 20 MG/ML
20 INJECTION, POWDER, LYOPHILIZED, FOR SOLUTION INTRAMUSCULAR ONCE
Status: COMPLETED | OUTPATIENT
Start: 2022-07-18 | End: 2022-07-18

## 2022-07-18 RX ADMIN — MIDAZOLAM HYDROCHLORIDE 4 MG: 2 INJECTION, SOLUTION INTRAMUSCULAR; INTRAVENOUS at 21:32

## 2022-07-18 RX ADMIN — ZIPRASIDONE MESYLATE 20 MG: 20 INJECTION, POWDER, LYOPHILIZED, FOR SOLUTION INTRAMUSCULAR at 21:31

## 2022-07-18 RX ADMIN — MIDAZOLAM 4 MG: 1 INJECTION INTRAMUSCULAR; INTRAVENOUS at 21:32

## 2022-07-18 ASSESSMENT — PAIN - FUNCTIONAL ASSESSMENT: PAIN_FUNCTIONAL_ASSESSMENT: NONE - DENIES PAIN

## 2022-07-19 ENCOUNTER — APPOINTMENT (OUTPATIENT)
Dept: CT IMAGING | Age: 52
End: 2022-07-19
Payer: MEDICAID

## 2022-07-19 ENCOUNTER — APPOINTMENT (OUTPATIENT)
Dept: GENERAL RADIOLOGY | Age: 52
End: 2022-07-19
Payer: MEDICAID

## 2022-07-19 LAB
AMPHETAMINE SCREEN, URINE: NOT DETECTED
BARBITURATE SCREEN URINE: NOT DETECTED
BENZODIAZEPINE SCREEN, URINE: POSITIVE
BILIRUBIN URINE: NEGATIVE
BLOOD, URINE: NEGATIVE
CANNABINOID SCREEN URINE: NOT DETECTED
CLARITY: CLEAR
COCAINE METABOLITE SCREEN URINE: NOT DETECTED
COLOR: YELLOW
EKG ATRIAL RATE: 88 BPM
EKG P AXIS: 44 DEGREES
EKG P-R INTERVAL: 166 MS
EKG Q-T INTERVAL: 372 MS
EKG QRS DURATION: 88 MS
EKG QTC CALCULATION (BAZETT): 450 MS
EKG R AXIS: -33 DEGREES
EKG T AXIS: 55 DEGREES
EKG VENTRICULAR RATE: 88 BPM
FENTANYL SCREEN, URINE: NOT DETECTED
GLUCOSE URINE: 250 MG/DL
INFLUENZA A: NOT DETECTED
INFLUENZA B: NOT DETECTED
KETONES, URINE: NEGATIVE MG/DL
LEUKOCYTE ESTERASE, URINE: NEGATIVE
Lab: ABNORMAL
METHADONE SCREEN, URINE: NOT DETECTED
NITRITE, URINE: NEGATIVE
OPIATE SCREEN URINE: NOT DETECTED
OXYCODONE URINE: NOT DETECTED
PH UA: 6 (ref 5–9)
PHENCYCLIDINE SCREEN URINE: NOT DETECTED
PROTEIN UA: NEGATIVE MG/DL
SARS-COV-2 RNA, RT PCR: NOT DETECTED
SPECIFIC GRAVITY UA: <=1.005 (ref 1–1.03)
UROBILINOGEN, URINE: 0.2 E.U./DL

## 2022-07-19 PROCEDURE — 87636 SARSCOV2 & INF A&B AMP PRB: CPT

## 2022-07-19 PROCEDURE — 70450 CT HEAD/BRAIN W/O DYE: CPT

## 2022-07-19 PROCEDURE — 81003 URINALYSIS AUTO W/O SCOPE: CPT

## 2022-07-19 PROCEDURE — 73130 X-RAY EXAM OF HAND: CPT

## 2022-07-19 PROCEDURE — 80307 DRUG TEST PRSMV CHEM ANLYZR: CPT

## 2022-07-19 NOTE — ED NOTES
Spoke to ED RN [facility] AT [facility] and informed her to call N to N to the Facility.      Naz Mcgregor, [facility]  07/19/22 7543

## 2022-07-19 NOTE — ED NOTES
Pt alert and awake at this time, cannot answer questions but is cooperative to staff care. Call to [facility]  re: discharge back to them [phone], transferred to Director of Nursing, no answer, transferred to floor, transferred to Wolf Bey, Admissions Director who reports per Brenden Álvarez's note 7/19/2022, they would like him referred to psych for possible admission. Reviewed with  supervisor Khushbu Blanton who reports she will discuss with supervisor at [facility] re: admission criteria.      [address] [place] [address] MS, hospitals  07/19/22 301 [facility] [place], [place]  07/19/22 5588

## 2022-07-19 NOTE — ED NOTES
Report called to Oriana Nicolas at [facility]  07/19/22 5693

## 2022-07-19 NOTE — ED NOTES
Patient awake and cooperative, lunch tray ordered     [facility] [employer], RN  07/19/22 6367

## 2022-07-19 NOTE — ED PROVIDER NOTES
Department of Emergency Medicine   ED  Provider Note  Admit Date/RoomTime: 7/18/2022  8:38 PM  ED Room: 15/15          History of Present Illness:  7/19/22, Time: 4:52 AM EDT  Chief Complaint   Patient presents with    Psychiatric Evaluation     Patient a resident at [facility] became very aggressive to staff was out side trying to leave was attempting to bite staff and EMS 5 versed and 50 of benadryl given by EMS. Facility stated norm,al is A/Ox4                    Mariama Chow is a 45 y.o. male presenting to the ED for psychiatric evaluation. Patient presents in [facility]. Has been aggressive to the staff for the past week and a half. He is trying to bite them. He is wheelchair-bound secondary to stroke, has been trying to leave the facility via wheelchair. Did require Versed and Benadryl in the field. He is aphasic at baseline. Currently, he is combative, cannot provide any other history. Review of Systems:   Unable to obtain due to the patient's current mental status      --------------------------------------------- PAST HISTORY ---------------------------------------------  Past Medical History:  has a past medical history of Cerebral artery occlusion with cerebral infarction ([facility] [facility] Diabetes mellitus ([facility] [facility] Hypertension, and [facility] Past Surgical History:  has a past surgical history that includes Hip fracture surgery (Right, 5/20/2022). Social History:  reports that he has never smoked. He has never used smokeless tobacco. He reports that he does not currently use drugs. He reports that he does not drink alcohol. Family History: family history is not on file. . Unless otherwise noted, family history is non contributory    The patients home medications have been reviewed. Allergies: Patient has no known allergies.         ---------------------------------------------------PHYSICAL EXAM--------------------------------------    Constitutional/General: Alert   Head: Normocephalic and atraumatic  Eyes: PERRL, EOMI, sclera non icteric  Mouth: Oropharynx clear, handling secretions, no trismus, no asymmetry of the posterior oropharynx or uvular edema  Neck: Supple, full ROM, no stridor, no meningeal signs  Respiratory: Lungs clear to auscultation bilaterally, no wheezes, rales, or rhonchi. Not in respiratory distress  Cardiovascular:  Regular rate. Regular rhythm. 2+ distal pulses. Equal extremity pulses. Chest: No chest wall tenderness  GI:  Abdomen Soft, Non tender, Non distended. No rebound, guarding, or rigidity. No pulsatile masses. Musculoskeletal: Moves all extremities x 2. Right upper extremity contracted, right lower extremity 2 out of 4 strength  Integument: skin warm and dry. No rashes. Neurologic: GCS 14  Psychiatric: Agitated          -------------------------------------------------- RESULTS -------------------------------------------------  I have personally reviewed all laboratory and imaging results for this patient. Results are listed below.      LABS: (Lab results interpreted by me)  Results for orders placed or performed during the hospital encounter of 07/18/22   COVID-19 & Influenza Combo    Specimen: Nasopharyngeal Swab   Result Value Ref Range    SARS-CoV-2 RNA, RT PCR NOT DETECTED NOT DETECTED    INFLUENZA A NOT DETECTED NOT DETECTED    INFLUENZA B NOT DETECTED NOT DETECTED   CBC with Auto Differential   Result Value Ref Range    WBC 6.8 4.5 - 11.5 E9/L    RBC 5.33 3.80 - 5.80 E12/L    Hemoglobin 14.2 12.5 - 16.5 g/dL    Hematocrit 42.0 37.0 - 54.0 %    MCV 78.8 (L) 80.0 - 99.9 fL    MCH 26.6 26.0 - 35.0 pg    MCHC 33.8 32.0 - 34.5 %    RDW 13.3 11.5 - 15.0 fL    Platelets 332 371 - 945 E9/L    MPV 9.5 7.0 - 12.0 fL    Neutrophils % 63.2 43.0 - 80.0 %    Immature Granulocytes % 0.3 0.0 - 5.0 %    Lymphocytes % 26.0 20.0 - 42.0 %    Monocytes % 7.7 2.0 - 12.0 %    Eosinophils % 2.4 0.0 - 6.0 %    Basophils % 0.4 0.0 - 2.0 %    Neutrophils Absolute 4.27 1.80 - 7.30 E9/L    Immature Granulocytes # 0.02 E9/L    Lymphocytes Absolute 1.76 1.50 - 4.00 E9/L    Monocytes Absolute 0.52 0.10 - 0.95 E9/L    Eosinophils Absolute 0.16 0.05 - 0.50 E9/L    Basophils Absolute 0.03 0.00 - 0.20 E9/L   Comprehensive Metabolic Panel   Result Value Ref Range    Sodium 138 132 - 146 mmol/L    Potassium 4.1 3.5 - 5.0 mmol/L    Chloride 101 98 - 107 mmol/L    CO2 23 22 - 29 mmol/L    Anion Gap 14 7 - 16 mmol/L    Glucose 251 (H) 74 - 99 mg/dL    BUN 14 6 - 20 mg/dL    CREATININE 1.0 0.7 - 1.2 mg/dL    GFR Non-African American >60 >=60 mL/min/1.73    GFR African American >60     Calcium 9.6 8.6 - 10.2 mg/dL    Total Protein 7.9 6.4 - 8.3 g/dL    Albumin 4.5 3.5 - 5.2 g/dL    Total Bilirubin 0.3 0.0 - 1.2 mg/dL    Alkaline Phosphatase 124 40 - 129 U/L    ALT 25 0 - 40 U/L    AST 16 0 - 39 U/L   Troponin   Result Value Ref Range    Troponin, High Sensitivity 17 (H) 0 - 11 ng/L   Urinalysis   Result Value Ref Range    Color, UA Yellow Straw/Yellow    Clarity, UA Clear Clear    Glucose, Ur 250 (A) Negative mg/dL    Bilirubin Urine Negative Negative    Ketones, Urine Negative Negative mg/dL    Specific Gravity, UA <=1.005 1.005 - 1.030    Blood, Urine Negative Negative    pH, UA 6.0 5.0 - 9.0    Protein, UA Negative Negative mg/dL    Urobilinogen, Urine 0.2 <2.0 E.U./dL    Nitrite, Urine Negative Negative    Leukocyte Esterase, Urine Negative Negative   Serum Drug Screen   Result Value Ref Range    Ethanol Lvl <10 mg/dL    Acetaminophen Level <5.0 (L) 10.0 - 59.7 mcg/mL    Salicylate, Serum 0.7 0.0 - 30.0 mg/dL    TCA Scrn NEGATIVE Cutoff:300 ng/mL   URINE DRUG SCREEN   Result Value Ref Range    Amphetamine Screen, Urine NOT DETECTED Negative <1000 ng/mL    Barbiturate Screen, Ur NOT DETECTED Negative < 200 ng/mL    Benzodiazepine Screen, Urine POSITIVE (A) Negative < 200 ng/mL    Cannabinoid Scrn, Ur NOT DETECTED Negative < 50ng/mL    Cocaine Metabolite Screen, Urine NOT DETECTED Negative < 300 ng/mL    Opiate Scrn, Ur NOT DETECTED Negative < 300ng/mL    PCP Screen, Urine NOT DETECTED Negative < 25 ng/mL    Methadone Screen, Urine NOT DETECTED Negative <300 ng/mL    Oxycodone Urine NOT DETECTED Negative <100 ng/mL    FENTANYL SCREEN, URINE NOT DETECTED Negative <1 ng/mL    Drug Screen Comment: see below    ,       RADIOLOGY:  Interpreted by Radiologist unless otherwise specified  CT HEAD WO CONTRAST   Final Result   No acute intracranial abnormality. XR CHEST PORTABLE   Final Result   No significant change of the past 3 days with no acute disease. EKG Interpretation  Interpreted by emergency department physician, Dr. Dara Miles     Sinus rhythm, rate 88, no STEMI, no ischemic change        ------------------------- NURSING NOTES AND VITALS REVIEWED ---------------------------   The nursing notes within the ED encounter and vital signs as below have been reviewed by myself  /79   Pulse 73   Temp 98.3 °F (36.8 °C) (Oral)   Resp 14   SpO2 95%     Oxygen Saturation Interpretation: Normal    The patients available past medical records and past encounters were reviewed. ------------------------------ ED COURSE/MEDICAL DECISION MAKING----------------------  Medications   sterile water injection (has no administration in time range)   ziprasidone (GEODON) injection 20 mg (20 mg IntraMUSCular Given 7/18/22 2131)   midazolam PF (VERSED) injection 4 mg (4 mg IntraVENous Given 7/18/22 2132)           Medical Decision Making: On arrival, the patient was able to remove his IV, he used his mouth to repeat out. Did require Geodon, Versed, this did sedate the patient. Labs and imaging reviewed. Patient is medically cleared. Counseling: The emergency provider has spoken with the patient and discussed todays results, in addition to providing specific details for the plan of care and counseling regarding the diagnosis and prognosis.   Questions are answered at this time and they are agreeable with the plan.       --------------------------------- IMPRESSION AND DISPOSITION ---------------------------------    IMPRESSION  1. Agitation        DISPOSITION  Disposition: Pending  Patient condition is stable        NOTE: This report was transcribed using voice recognition software.  Every effort was made to ensure accuracy; however, inadvertent computerized transcription errors may be present       Keon Brown MD  07/19/22 [id number]

## 2022-07-19 NOTE — ED NOTES
[facility]. Spoke with Ofelia Yi. Transport ETA 2130.      Giuseppe King  07/19/22 1619
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
## 2022-07-19 NOTE — ED NOTES
SW Supervisor spoke with Paola Tavarez, Admissions Director from [facility], who reported that patient can return to their facility today. NABIL Rhodesm Age aware and will schedule transportation.     ED nurse will need to call nurse to nurse prior to discharge: [phone] [address] Work Supervisor

## 2022-07-19 NOTE — ED NOTES
SW Supervisor called [facility] Admissions Director Ravi Alex ([phone]), no answer- left voicemail.     CHEMO Blake, [facility] Work Super[facility]

## 2022-07-20 ENCOUNTER — HOSPITAL ENCOUNTER (EMERGENCY)
Age: 52
Discharge: NURSING, REHAB OR LONG-TERM CARE | End: 2022-07-21
Attending: EMERGENCY MEDICINE
Payer: MEDICAID

## 2022-07-20 VITALS
SYSTOLIC BLOOD PRESSURE: 132 MMHG | HEART RATE: 70 BPM | RESPIRATION RATE: 16 BRPM | DIASTOLIC BLOOD PRESSURE: 74 MMHG | OXYGEN SATURATION: 97 % | TEMPERATURE: 97.9 F

## 2022-07-20 DIAGNOSIS — Z76.89 ENCOUNTER FOR PSYCHIATRIC ASSESSMENT: Primary | ICD-10-CM

## 2022-07-20 PROCEDURE — 99283 EMERGENCY DEPT VISIT LOW MDM: CPT

## 2022-07-20 ASSESSMENT — PAIN - FUNCTIONAL ASSESSMENT: PAIN_FUNCTIONAL_ASSESSMENT: NONE - DENIES PAIN

## 2022-07-20 ASSESSMENT — LIFESTYLE VARIABLES: HOW OFTEN DO YOU HAVE A DRINK CONTAINING ALCOHOL: NEVER
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
## 2022-07-20 NOTE — ED NOTES
Pt has been calm and cooperative for this RN during her care of him. Pt with poor judgment but easily redirectable and mood appears happy when given care. No issues of aggression noted during shift.  informed.      Nataliya Blanco RN  07/20/22 7520

## 2022-07-20 NOTE — ED NOTES
SW has limited communication skills but when asked if he was suicidal or homicidal he shoot his head no. Pt is to be D/C back to facility. NABIL made RN aware of plan.           CHEMO Jones, [facility]  07/20/22 1943

## 2022-07-20 NOTE — ED NOTES
Pt c.o L hand pain with deformity of unknown origin or timeframe to his 5th digit. Notified Dr. Maria Rehman, order received. Will cont to monitor.      Jayne Hawthorne RN  07/20/22 1628

## 2022-07-20 NOTE — ED PROVIDER NOTES
HPI:  7/20/22, Time: 2:54 AM EDT         Pamela Alberto is a 45 y.o. male presenting to the ED for psychiatric evaluation, beginning 1 day ago. The complaint has been persistent, moderate in severity, and worsened by nothing. Patient was brought in by EMS he was reportedly just seen here in the hospital and treated and released. Patient became aggressive with staff again. Patient had lab work done on the 18th as well as imaging done. Patient unable to give history due to her prior history of stroke aphasia. Patient is weak on the right side. Patient currently having no pain. Patient vital signs noted. ROS:   Pertinent positives and negatives are stated within HPI, all other systems reviewed and are negative.  --------------------------------------------- PAST HISTORY ---------------------------------------------  Past Medical History:  has a past medical history of Cerebral artery occlusion with cerebral infarction ([facility] [address] Diabetes mellitus ([facility] [address] Hypertension, and Seizures ([facility] [address] [facility] Past Surgical History:  has a past surgical history that includes Hip fracture surgery (Right, 5/20/2022). Social History:  reports that he has never smoked. He has never used smokeless tobacco. He reports that he does not currently use drugs. He reports that he does not drink alcohol. Family History: family history is not on file. The patients home medications have been reviewed. Allergies: Patient has no known allergies. ---------------------------------------------------PHYSICAL EXAM--------------------------------------    Constitutional/General: Alert and awake unable to assess orientation. Patient does have aphasia.   Head: Normocephalic and atraumatic  Eyes: PERRL, EOMI  Mouth: Oropharynx clear, handling secretions, no trismus  Neck: Supple, full ROM, non tender to palpation in the midline, no stridor, no crepitus, no meningeal signs  Pulmonary: Lungs clear to auscultation bilaterally, no wheezes, rales, or rhonchi. Not in respiratory distress  Cardiovascular:  Regular rate. Regular rhythm. No murmurs, gallops, or rubs. 2+ distal pulses  Chest: no chest wall tenderness  Abdomen: Soft. Non tender. Non distended. +BS. No rebound, guarding, or rigidity. No pulsatile masses appreciated. Musculoskeletal: Moves all extremities except for right side from prior stroke warm and well perfused, no clubbing, cyanosis, or edema. Capillary refill <3 seconds  Skin: warm and dry. No rashes. Neurologic: Patient weak on right side from prior stroke. Patient able to move left upper extremity lower extremity. Psych: Normal Affect    -------------------------------------------------- RESULTS -------------------------------------------------  I have personally reviewed all laboratory and imaging results for this patient. Results are listed below. LABS:  No results found for this visit on 07/20/22. RADIOLOGY:  Interpreted by Radiologist.  No orders to display             ------------------------- NURSING NOTES AND VITALS REVIEWED ---------------------------   The nursing notes within the ED encounter and vital signs as below have been reviewed by myself. BP (!) 153/102   Pulse 74   Temp 97.4 °F (36.3 °C)   Resp 18   Ht 5' 10\" (1.778 m)   Wt 179 lb (81.2 kg)   SpO2 97%   BMI 25.68 kg/m²   Oxygen Saturation Interpretation: Normal    The patients available past medical records and past encounters were reviewed. ------------------------------ ED COURSE/MEDICAL DECISION MAKING----------------------  Medications - No data to display          Medical Decision Making:    Patient presenting here because of psychiatric evaluation. Patient reportedly became agitated and aggressive with staff. Patient was just here in the hospital.  Patient was discharged back to facility but reportedly became more aggressive and patient was sent back. I did review his old labs.   Patient was medically clear vital signs noted    Re-Evaluations:             Re-evaluation. Patients symptoms show no change      Consultations:                 Critical Care: This patient's ED course included: a personal history and physicial eaxmination    This patient has been closely monitored during their ED course. Counseling: The emergency provider has spoken with the patient and discussed todays results, in addition to providing specific details for the plan of care and counseling regarding the diagnosis and prognosis. Questions are answered at this time and they are agreeable with the plan.       --------------------------------- IMPRESSION AND DISPOSITION ---------------------------------    IMPRESSION  1. Encounter for psychiatric assessment        DISPOSITION  Disposition:  to assist with disposition  Patient condition is stable        NOTE: This report was transcribed using voice recognition software.  Every effort was made to ensure accuracy; however, inadvertent computerized transcription errors may be present          Gabriela Denson MD  07/20/22 0016       Gabriela Denson MD  07/20/22 6984

## 2022-07-20 NOTE — ED NOTES
NATALYA NURSE WILL REVIEW WITH PSYCH TEAM FOR DISPO     Gar[facility]  07/20/22 2397

## 2022-07-20 NOTE — ED NOTES
Per Dr John/ Jason Rangel pt does not meet admission criteria for admission to [facility]. Please refer this patient  back to Emergency Department Services.      Gloria Dumont RN  07/20/22 8207

## 2022-07-20 NOTE — ED NOTES
Discussed case with Dr John/ Ju Gonzalez NP, will call this rn back.      Demian Neil RN  07/20/22 3241

## 2022-07-21 ENCOUNTER — HOSPITAL ENCOUNTER (EMERGENCY)
Age: 52
Discharge: NURSING, REHAB OR LONG-TERM CARE | End: 2022-07-24
Attending: EMERGENCY MEDICINE | Admitting: EMERGENCY MEDICINE
Payer: MEDICAID

## 2022-07-21 VITALS
TEMPERATURE: 97.5 F | HEART RATE: 75 BPM | DIASTOLIC BLOOD PRESSURE: 65 MMHG | SYSTOLIC BLOOD PRESSURE: 140 MMHG | RESPIRATION RATE: 18 BRPM | HEIGHT: 70 IN | BODY MASS INDEX: 25.62 KG/M2 | WEIGHT: 179 LBS | OXYGEN SATURATION: 95 %

## 2022-07-21 DIAGNOSIS — Z76.89 ENCOUNTER FOR PSYCHIATRIC ASSESSMENT: Primary | ICD-10-CM

## 2022-07-21 DIAGNOSIS — R46.89 AGGRESSIVE BEHAVIOR: ICD-10-CM

## 2022-07-21 DIAGNOSIS — R46.89 COMBATIVE BEHAVIOR: ICD-10-CM

## 2022-07-21 LAB
ACETAMINOPHEN LEVEL: <5 MCG/ML (ref 10–30)
ALBUMIN SERPL-MCNC: 4.5 G/DL (ref 3.5–5.2)
ALP BLD-CCNC: 145 U/L (ref 40–129)
ALT SERPL-CCNC: 30 U/L (ref 0–40)
AMPHETAMINE SCREEN, URINE: NOT DETECTED
ANION GAP SERPL CALCULATED.3IONS-SCNC: 15 MMOL/L (ref 7–16)
AST SERPL-CCNC: 21 U/L (ref 0–39)
BACTERIA: ABNORMAL /HPF
BARBITURATE SCREEN URINE: NOT DETECTED
BASOPHILS ABSOLUTE: 0.04 E9/L (ref 0–0.2)
BASOPHILS RELATIVE PERCENT: 0.4 % (ref 0–2)
BENZODIAZEPINE SCREEN, URINE: NOT DETECTED
BILIRUB SERPL-MCNC: 0.4 MG/DL (ref 0–1.2)
BILIRUBIN URINE: NEGATIVE
BLOOD, URINE: ABNORMAL
BUN BLDV-MCNC: 14 MG/DL (ref 6–20)
CALCIUM SERPL-MCNC: 9.9 MG/DL (ref 8.6–10.2)
CANNABINOID SCREEN URINE: NOT DETECTED
CHLORIDE BLD-SCNC: 101 MMOL/L (ref 98–107)
CLARITY: ABNORMAL
CO2: 21 MMOL/L (ref 22–29)
COCAINE METABOLITE SCREEN URINE: NOT DETECTED
COLOR: YELLOW
CREAT SERPL-MCNC: 1.1 MG/DL (ref 0.7–1.2)
EOSINOPHILS ABSOLUTE: 0.1 E9/L (ref 0.05–0.5)
EOSINOPHILS RELATIVE PERCENT: 1.1 % (ref 0–6)
ETHANOL: <10 MG/DL (ref 0–0.08)
FENTANYL SCREEN, URINE: NOT DETECTED
GFR AFRICAN AMERICAN: >60
GFR NON-AFRICAN AMERICAN: >60 ML/MIN/1.73
GLUCOSE BLD-MCNC: 248 MG/DL
GLUCOSE BLD-MCNC: 373 MG/DL (ref 74–99)
GLUCOSE URINE: >=1000 MG/DL
HCT VFR BLD CALC: 45.2 % (ref 37–54)
HEMOGLOBIN: 15.2 G/DL (ref 12.5–16.5)
IMMATURE GRANULOCYTES #: 0.02 E9/L
IMMATURE GRANULOCYTES %: 0.2 % (ref 0–5)
INFLUENZA A: NOT DETECTED
INFLUENZA B: NOT DETECTED
KETONES, URINE: NEGATIVE MG/DL
LEUKOCYTE ESTERASE, URINE: NEGATIVE
LYMPHOCYTES ABSOLUTE: 2.01 E9/L (ref 1.5–4)
LYMPHOCYTES RELATIVE PERCENT: 21.5 % (ref 20–42)
Lab: ABNORMAL
MCH RBC QN AUTO: 27.1 PG (ref 26–35)
MCHC RBC AUTO-ENTMCNC: 33.6 % (ref 32–34.5)
MCV RBC AUTO: 80.7 FL (ref 80–99.9)
METER GLUCOSE: 248 MG/DL (ref 74–99)
METHADONE SCREEN, URINE: NOT DETECTED
MONOCYTES ABSOLUTE: 0.75 E9/L (ref 0.1–0.95)
MONOCYTES RELATIVE PERCENT: 8 % (ref 2–12)
NEUTROPHILS ABSOLUTE: 6.45 E9/L (ref 1.8–7.3)
NEUTROPHILS RELATIVE PERCENT: 68.8 % (ref 43–80)
NITRITE, URINE: NEGATIVE
OPIATE SCREEN URINE: POSITIVE
OXYCODONE URINE: NOT DETECTED
PDW BLD-RTO: 13.3 FL (ref 11.5–15)
PH UA: 6 (ref 5–9)
PHENCYCLIDINE SCREEN URINE: NOT DETECTED
PLATELET # BLD: 296 E9/L (ref 130–450)
PMV BLD AUTO: 9.7 FL (ref 7–12)
POTASSIUM REFLEX MAGNESIUM: 4.8 MMOL/L (ref 3.5–5)
PROTEIN UA: 30 MG/DL
RBC # BLD: 5.6 E12/L (ref 3.8–5.8)
RBC UA: ABNORMAL /HPF (ref 0–2)
SALICYLATE, SERUM: <0.3 MG/DL (ref 0–30)
SARS-COV-2 RNA, RT PCR: NOT DETECTED
SODIUM BLD-SCNC: 137 MMOL/L (ref 132–146)
SPECIFIC GRAVITY UA: 1.02 (ref 1–1.03)
TOTAL PROTEIN: 8.5 G/DL (ref 6.4–8.3)
TRICYCLIC ANTIDEPRESSANTS SCREEN SERUM: NEGATIVE NG/ML
UROBILINOGEN, URINE: 0.2 E.U./DL
WBC # BLD: 9.4 E9/L (ref 4.5–11.5)
WBC UA: ABNORMAL /HPF (ref 0–5)

## 2022-07-21 PROCEDURE — 99285 EMERGENCY DEPT VISIT HI MDM: CPT

## 2022-07-21 PROCEDURE — 36415 COLL VENOUS BLD VENIPUNCTURE: CPT

## 2022-07-21 PROCEDURE — 80143 DRUG ASSAY ACETAMINOPHEN: CPT

## 2022-07-21 PROCEDURE — 87636 SARSCOV2 & INF A&B AMP PRB: CPT

## 2022-07-21 PROCEDURE — 80179 DRUG ASSAY SALICYLATE: CPT

## 2022-07-21 PROCEDURE — 80307 DRUG TEST PRSMV CHEM ANLYZR: CPT

## 2022-07-21 PROCEDURE — 81001 URINALYSIS AUTO W/SCOPE: CPT

## 2022-07-21 PROCEDURE — 93005 ELECTROCARDIOGRAM TRACING: CPT | Performed by: STUDENT IN AN ORGANIZED HEALTH CARE EDUCATION/TRAINING PROGRAM

## 2022-07-21 PROCEDURE — 85025 COMPLETE CBC W/AUTO DIFF WBC: CPT

## 2022-07-21 PROCEDURE — 82962 GLUCOSE BLOOD TEST: CPT

## 2022-07-21 PROCEDURE — 80053 COMPREHEN METABOLIC PANEL: CPT

## 2022-07-21 PROCEDURE — 82077 ASSAY SPEC XCP UR&BREATH IA: CPT

## 2022-07-21 RX ORDER — 0.9 % SODIUM CHLORIDE 0.9 %
500 INTRAVENOUS SOLUTION INTRAVENOUS ONCE
Status: DISCONTINUED | OUTPATIENT
Start: 2022-07-21 | End: 2022-07-21

## 2022-07-21 NOTE — ED NOTES
Pt gown sheets and linens changed. Hickory placed on pt. Pt calm and cooperative.      Yasmeen King RN  07/21/22 1287
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
## 2022-07-21 NOTE — ED NOTES
pt is A&Ox4,per ED provider pt condition has improved and stable for discharge. Pt speech is clear and coherent with pt speaking in full sentances. pt resp are even and unlabored, no use of accessory muscles noted. Verbal and written discharge instructions reviewed with pt. Pt verbalized understanding of DX,TX and importance of follow up. Pt instructed to return to ED as needed for persistent or worsening symptoms or any new concerns. Pt is leaving steady  with physicians ambulance.         Chico Lozano RN  07/21/22 6143

## 2022-07-21 NOTE — ED NOTES
Report given to Chelsie Umaña at [facility].       Kelly Godoy RN  07/21/22 9855

## 2022-07-21 NOTE — ED NOTES
Pt calm and cooperative wanting to go home.  Pt updated on plan to go back to facility     Ciera Zuluaga RN  07/20/22 2113       Ciera Zuluaga RN  07/20/22 2113

## 2022-07-22 LAB
EKG ATRIAL RATE: 71 BPM
EKG P AXIS: 47 DEGREES
EKG P-R INTERVAL: 170 MS
EKG Q-T INTERVAL: 410 MS
EKG QRS DURATION: 78 MS
EKG QTC CALCULATION (BAZETT): 445 MS
EKG R AXIS: -37 DEGREES
EKG T AXIS: 25 DEGREES
EKG VENTRICULAR RATE: 71 BPM

## 2022-07-22 ASSESSMENT — PAIN DESCRIPTION - LOCATION: LOCATION: LEG

## 2022-07-22 ASSESSMENT — PAIN DESCRIPTION - ORIENTATION: ORIENTATION: RIGHT

## 2022-07-22 ASSESSMENT — PAIN SCALES - WONG BAKER: WONGBAKER_NUMERICALRESPONSE: 2

## 2022-07-22 ASSESSMENT — PAIN - FUNCTIONAL ASSESSMENT
PAIN_FUNCTIONAL_ASSESSMENT: WONG-BAKER FACES
PAIN_FUNCTIONAL_ASSESSMENT: NONE - DENIES PAIN

## 2022-07-22 NOTE — ED NOTES
Per 's request this RN referred patient to the [facility] Marty Miles, spoke to Chele Daniels RN.       Tariq Monroy RN  07/22/22 3296

## 2022-07-22 NOTE — ED NOTES
Awake and agitated at this time. Sitter present. Security to bedside and able to orient pt. Pt c/o being wet via nonverbal communication. Ana care given and diaper changed. Pt tolerated well and followed commands. Repositioned in bed. No distress noted.       Emili Fernandez RN  07/21/22 5847
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
## 2022-07-22 NOTE — ED NOTES
Remains awake and calm and cooperative at this time. No distress noted.       Etta Meyer RN  07/21/22 1221

## 2022-07-22 NOTE — ED NOTES
Resting with eyes closed. resps easy and unlabored.  No distress noted     Marisa Dillon RN  07/22/22 7017

## 2022-07-23 ENCOUNTER — APPOINTMENT (OUTPATIENT)
Dept: GENERAL RADIOLOGY | Age: 52
End: 2022-07-23
Payer: MEDICAID

## 2022-07-23 VITALS
WEIGHT: 179 LBS | OXYGEN SATURATION: 98 % | HEART RATE: 86 BPM | TEMPERATURE: 97.9 F | SYSTOLIC BLOOD PRESSURE: 152 MMHG | BODY MASS INDEX: 25.62 KG/M2 | RESPIRATION RATE: 16 BRPM | HEIGHT: 70 IN | DIASTOLIC BLOOD PRESSURE: 105 MMHG

## 2022-07-23 LAB — METER GLUCOSE: 320 MG/DL (ref 74–99)

## 2022-07-23 PROCEDURE — 96372 THER/PROPH/DIAG INJ SC/IM: CPT

## 2022-07-23 PROCEDURE — 73560 X-RAY EXAM OF KNEE 1 OR 2: CPT

## 2022-07-23 PROCEDURE — 73590 X-RAY EXAM OF LOWER LEG: CPT

## 2022-07-23 PROCEDURE — 73552 X-RAY EXAM OF FEMUR 2/>: CPT

## 2022-07-23 PROCEDURE — 6360000002 HC RX W HCPCS

## 2022-07-23 PROCEDURE — 82962 GLUCOSE BLOOD TEST: CPT

## 2022-07-23 PROCEDURE — 6360000002 HC RX W HCPCS: Performed by: EMERGENCY MEDICINE

## 2022-07-23 RX ORDER — DROPERIDOL 2.5 MG/ML
5 INJECTION, SOLUTION INTRAMUSCULAR; INTRAVENOUS ONCE
Status: COMPLETED | OUTPATIENT
Start: 2022-07-23 | End: 2022-07-23

## 2022-07-23 RX ORDER — MIDAZOLAM HYDROCHLORIDE 2 MG/2ML
2 INJECTION, SOLUTION INTRAMUSCULAR; INTRAVENOUS ONCE
Status: COMPLETED | OUTPATIENT
Start: 2022-07-23 | End: 2022-07-23

## 2022-07-23 RX ORDER — DROPERIDOL 2.5 MG/ML
INJECTION, SOLUTION INTRAMUSCULAR; INTRAVENOUS
Status: COMPLETED
Start: 2022-07-23 | End: 2022-07-23

## 2022-07-23 RX ADMIN — MIDAZOLAM 2 MG: 1 INJECTION INTRAMUSCULAR; INTRAVENOUS at 17:37

## 2022-07-23 RX ADMIN — DROPERIDOL 5 MG: 2.5 INJECTION, SOLUTION INTRAMUSCULAR; INTRAVENOUS at 17:33

## 2022-07-23 NOTE — ED NOTES
Pt sleeping at this time. resps easy and unlabored. No distress noted.       Sukumar Ureña RN  07/23/22 5309

## 2022-07-23 NOTE — ED NOTES
ACCESS CENTER UPDATE:     Pt has [facility]- no answer  [facility]- no answer     Denials  [facility] out of network  [facility]- out of network  [facility]-out of network     CHEMO Jean, [facility]  07/23/22 3596

## 2022-07-23 NOTE — ED NOTES
Pt sitting up to edge of bed. Pt becoming slightly agitated. Offered breakfast tray to pt, refused. Attempted to get pt to sit back in bed for safety, pt refused. Sitter at bedside.       Louise Wheat RN  07/23/22 6552

## 2022-07-23 NOTE — ED NOTES
ACCESS CENTER UPDATE:     Pt has [facility] out of network  [address] - no answer  [address] [facility] - requesting a call back     CHEMO Jean, [place]  07/22/22 8720

## 2022-07-23 NOTE — ED NOTES
PT REMAINS STABLE AT THIS FACILITY. HIS BEHAVIOR IS CONTROLLED, HE IS NON AGGRESSIVE AND FOLLOWS DIRECTION FROM STAFF. HE DID GET AGITATED THIS MORNING WITH THE NURSES BUT WAS REDIRECTED AND IS STABLE.     [address] WILL [facility]INUE [facility]ING [facility]  07/23/22 0411
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
## 2022-07-23 NOTE — ED NOTES
incont of urine. Ana care given and tolerated well. Clean, dry depends placed on pt. No distress noted.  Sitter remains present      Brenda Sarkar RN  07/23/22 5626

## 2022-07-23 NOTE — ED NOTES
Pt refusing to have vitals done at this time. +incont urine. Ana care given and depends changed and gown. Pt tolerated well and remains calm and cooperative. Sitter remains present. No distress noted.      Marisa Dillon RN  07/23/22 6463

## 2022-07-24 NOTE — ED NOTES
Pt cleaned of soiled brief and linen. Pt calm and cooperative with patient care. CO remains at bedside.       Jose Mart RN  07/24/22 7903

## 2022-07-24 NOTE — ED NOTES
Assumed care of pt. Pt appears calm at this time. VS obtained. Pt followed simple commands. CO at bedside.       Ronit Tejada RN  07/24/22 6930
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
## 2022-07-24 NOTE — ED NOTES
[facility]omin [facility] called and informed that [address] [place] [address] said to fax the pink slip made out to them.     Pink slip has been faxed to [address] [place], [place] [address]CHEMO LSW  07/24/22 0004

## 2022-07-24 NOTE — ED NOTES
Patient has been accepted to [facility] by Dr. Owen Live. Patients bed will be assigned on arrival    [address] doesn't have anything available till afternoon. [facility] will reach out to see if they can get transport sooner CHEMO [place], RYNE  07/24/22 0113      JOSE CRUZ MYERS 1230p       CHEMO Bonilla, RYNE  07/24/22 0144

## 2022-07-24 NOTE — ED NOTES
NATALYA NABIL attempted to call [facility] to update on patients transfer information.  No answer     CHEMO Aguilar, [facility]  07/24/22 1140

## 2022-07-28 DIAGNOSIS — S72.001D CLOSED FRACTURE OF NECK OF RIGHT FEMUR WITH ROUTINE HEALING, SUBSEQUENT ENCOUNTER: Primary | ICD-10-CM

## 2022-08-02 ENCOUNTER — APPOINTMENT (OUTPATIENT)
Dept: GENERAL RADIOLOGY | Age: 52
End: 2022-08-02
Payer: MEDICAID

## 2022-08-02 ENCOUNTER — APPOINTMENT (OUTPATIENT)
Dept: CT IMAGING | Age: 52
End: 2022-08-02
Payer: MEDICAID

## 2022-08-02 ENCOUNTER — HOSPITAL ENCOUNTER (EMERGENCY)
Age: 52
Discharge: NURSING, REHAB OR LONG-TERM CARE | End: 2022-08-02
Attending: EMERGENCY MEDICINE
Payer: MEDICAID

## 2022-08-02 VITALS
SYSTOLIC BLOOD PRESSURE: 154 MMHG | DIASTOLIC BLOOD PRESSURE: 72 MMHG | HEART RATE: 72 BPM | OXYGEN SATURATION: 95 % | RESPIRATION RATE: 18 BRPM | TEMPERATURE: 98 F

## 2022-08-02 DIAGNOSIS — R73.9 HYPERGLYCEMIA: ICD-10-CM
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
## 2022-08-02 LAB
ALBUMIN SERPL-MCNC: 4.2 G/DL (ref 3.5–5.2)
ALP BLD-CCNC: 134 U/L (ref 40–129)
ALT SERPL-CCNC: 31 U/L (ref 0–40)
ANION GAP SERPL CALCULATED.3IONS-SCNC: 13 MMOL/L (ref 7–16)
AST SERPL-CCNC: 18 U/L (ref 0–39)
BASOPHILS ABSOLUTE: 0.04 E9/L (ref 0–0.2)
BASOPHILS RELATIVE PERCENT: 0.5 % (ref 0–2)
BILIRUB SERPL-MCNC: 0.3 MG/DL (ref 0–1.2)
BUN BLDV-MCNC: 13 MG/DL (ref 6–20)
CALCIUM SERPL-MCNC: 9.5 MG/DL (ref 8.6–10.2)
CHLORIDE BLD-SCNC: 101 MMOL/L (ref 98–107)
CO2: 25 MMOL/L (ref 22–29)
CREAT SERPL-MCNC: 0.9 MG/DL (ref 0.7–1.2)
EOSINOPHILS ABSOLUTE: 0.21 E9/L (ref 0.05–0.5)
EOSINOPHILS RELATIVE PERCENT: 2.8 % (ref 0–6)
GFR AFRICAN AMERICAN: >60
GFR NON-AFRICAN AMERICAN: >60 ML/MIN/1.73
GLUCOSE BLD-MCNC: 314 MG/DL (ref 74–99)
HCT VFR BLD CALC: 42.5 % (ref 37–54)
HEMOGLOBIN: 14.6 G/DL (ref 12.5–16.5)
IMMATURE GRANULOCYTES #: 0.03 E9/L
IMMATURE GRANULOCYTES %: 0.4 % (ref 0–5)
LYMPHOCYTES ABSOLUTE: 1.7 E9/L (ref 1.5–4)
LYMPHOCYTES RELATIVE PERCENT: 22.6 % (ref 20–42)
MCH RBC QN AUTO: 27.6 PG (ref 26–35)
MCHC RBC AUTO-ENTMCNC: 34.4 % (ref 32–34.5)
MCV RBC AUTO: 80.3 FL (ref 80–99.9)
MONOCYTES ABSOLUTE: 0.5 E9/L (ref 0.1–0.95)
MONOCYTES RELATIVE PERCENT: 6.6 % (ref 2–12)
NEUTROPHILS ABSOLUTE: 5.05 E9/L (ref 1.8–7.3)
NEUTROPHILS RELATIVE PERCENT: 67.1 % (ref 43–80)
PDW BLD-RTO: 13.4 FL (ref 11.5–15)
PLATELET # BLD: 228 E9/L (ref 130–450)
PMV BLD AUTO: 10.4 FL (ref 7–12)
POTASSIUM REFLEX MAGNESIUM: 3.9 MMOL/L (ref 3.5–5)
RBC # BLD: 5.29 E12/L (ref 3.8–5.8)
REASON FOR REJECTION: NORMAL
REJECTED TEST: NORMAL
SODIUM BLD-SCNC: 139 MMOL/L (ref 132–146)
TOTAL PROTEIN: 7.6 G/DL (ref 6.4–8.3)
WBC # BLD: 7.5 E9/L (ref 4.5–11.5)

## 2022-08-02 PROCEDURE — 73562 X-RAY EXAM OF KNEE 3: CPT

## 2022-08-02 PROCEDURE — 70450 CT HEAD/BRAIN W/O DYE: CPT

## 2022-08-02 PROCEDURE — 73610 X-RAY EXAM OF ANKLE: CPT

## 2022-08-02 PROCEDURE — 85025 COMPLETE CBC W/AUTO DIFF WBC: CPT

## 2022-08-02 PROCEDURE — 73502 X-RAY EXAM HIP UNI 2-3 VIEWS: CPT

## 2022-08-02 PROCEDURE — 80053 COMPREHEN METABOLIC PANEL: CPT

## 2022-08-02 PROCEDURE — 72125 CT NECK SPINE W/O DYE: CPT

## 2022-08-02 PROCEDURE — 99284 EMERGENCY DEPT VISIT MOD MDM: CPT

## 2022-08-02 ASSESSMENT — PAIN - FUNCTIONAL ASSESSMENT: PAIN_FUNCTIONAL_ASSESSMENT: NONE - DENIES PAIN

## 2022-08-02 NOTE — ED PROVIDER NOTES
HPI     Patient is a 45 y.o. male presents with a chief complaint of fall      Patient presents with a fall from a facility. Patient has history of stroke. Patient slid out of his wheelchair and landed on his right arm and his right leg. Patient reportedly did not hit his head. Has no changes in vision urinary problems. Patient is unable to provide history. Patient Finnish-speaking but nonverbal.  Patient's history of stroke. Discussed case with patient's nurse who stated that patient fell out of a wheelchair and landed on his hip. Patient was then grimacing when touching on his hip. Patient is at neuro baseline. Patient is normally nonverbal.  Nurse states that patient understands [place]. Review of Systems   Unable to perform ROS: Patient nonverbal      Physical Exam  Vitals and nursing note reviewed. Constitutional:       Appearance: He is well-developed. He is not ill-appearing or toxic-appearing. HENT:      Head: Normocephalic and atraumatic. Eyes:      Conjunctiva/sclera: Conjunctivae normal.   Cardiovascular:      Rate and Rhythm: Normal rate and regular rhythm. Heart sounds: Normal heart sounds. No murmur heard. Pulmonary:      Effort: Pulmonary effort is normal. No respiratory distress. Breath sounds: Normal breath sounds. No wheezing or rales. Abdominal:      General: Bowel sounds are normal.      Palpations: Abdomen is soft. Tenderness: There is no abdominal tenderness. There is no guarding or rebound. Musculoskeletal:         General: No tenderness or deformity. Cervical back: Normal range of motion and neck supple. Skin:     General: Skin is warm and dry. Neurological:      Mental Status: He is alert. Comments: Patient has no movement of the right side. Patient is nonverbal.  Patient is moving the left extremity. Will nod yes when touching right hip and right leg.         Procedures     [facility]           Patient is a 45 y.o. male presenting with weakness. Patient has a history of an old stroke. Patient has good pulses in the right foot verified with Doppler. Patient had x-rays of the right leg. X-rays were negative. Patient is Italian-speaking and an  present. Patient had a CT of the head. CT shows old infarcts. Patient's lab work is otherwise benign. Discussed case with nursing home. Patiently discharged home. Patient was given return precautions. Labs were interpreted by me. Patient will follow up with their primary care provider. Patient is agreeable to this plan. Patient has remained stable throughout their stay in the ED. Patient was seen and evaluated by myself and my attending Trever Robles MD. Assessment and Plan discussed with attending provider, please see attestation for final plan of care. This note was done using dictation software and there may be some grammatical errors associated with this. Nava Martinez MD            --------------------------------------------- PAST HISTORY ---------------------------------------------  Past Medical History:  has a past medical history of Cerebral artery occlusion with cerebral infarction ([facility] [address] Diabetes mellitus ([facility] [address] Hypertension, and [facility] Past Surgical History:  has a past surgical history that includes Hip fracture surgery (Right, 5/20/2022). Social History:  reports that he has never smoked. He has never used smokeless tobacco. He reports that he does not currently use drugs. He reports that he does not drink alcohol. Family History: family history is not on file. The patients home medications have been reviewed. Allergies: Patient has no known allergies.     -------------------------------------------------- RESULTS -------------------------------------------------  Labs:  Results for orders placed or performed during the hospital encounter of 08/02/22   CBC with Auto Differential   Result Value Ref Range    WBC 7.5 4.5 - 11.5 E9/L RBC 5.29 3.80 - 5.80 E12/L    Hemoglobin 14.6 12.5 - 16.5 g/dL    Hematocrit 42.5 37.0 - 54.0 %    MCV 80.3 80.0 - 99.9 fL    MCH 27.6 26.0 - 35.0 pg    MCHC 34.4 32.0 - 34.5 %    RDW 13.4 11.5 - 15.0 fL    Platelets 608 561 - 836 E9/L    MPV 10.4 7.0 - 12.0 fL   Comprehensive Metabolic Panel w/ Reflex to MG   Result Value Ref Range    Sodium 139 132 - 146 mmol/L    Potassium reflex Magnesium 3.9 3.5 - 5.0 mmol/L    Chloride 101 98 - 107 mmol/L    CO2 25 22 - 29 mmol/L    Anion Gap 13 7 - 16 mmol/L    Glucose 314 (H) 74 - 99 mg/dL    BUN 13 6 - 20 mg/dL    Creatinine 0.9 0.7 - 1.2 mg/dL    GFR Non-African American >60 >=60 mL/min/1.73    GFR African American >60     Calcium 9.5 8.6 - 10.2 mg/dL    Total Protein 7.6 6.4 - 8.3 g/dL    Albumin 4.2 3.5 - 5.2 g/dL    Total Bilirubin 0.3 0.0 - 1.2 mg/dL    Alkaline Phosphatase 134 (H) 40 - 129 U/L    ALT 31 0 - 40 U/L    AST 18 0 - 39 U/L   SPECIMEN REJECTION   Result Value Ref Range    Rejected Test cmp     Reason for Rejection see below        Radiology:  XR KNEE RIGHT (3 VIEWS)   Final Result   Moderate degenerative changes seen in the right knee with no acute bony   abnormality. XR HIP 2-3 VW W PELVIS RIGHT   Final Result   Hardware is unchanged in positioning. There is no acute bony abnormality   with moderate degenerative changes seen within the hip. XR ANKLE RIGHT (MIN 3 VIEWS)   Final Result   Degenerative changes seen within the right ankle with no evidence of acute   bony abnormality. CT Head WO Contrast   Final Result   1. There is no acute intracranial hemorrhage or acute intracranial   abnormality. 2. 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 There is no acute compression fracture or subluxation of the cervical   spine.    2. Mild multilevel degenerative disc and degenerative joint disease.             ------------------------- NURSING NOTES AND VITALS REVIEWED ---------------------------  Date / Time Roomed:  8/2/2022 11:51 AM  ED Bed Assignment:  TATE ONEILL/ROSEMARIE    The nursing notes within the ED encounter and vital signs as below have been reviewed. BP (!) 161/98   Pulse 71   Temp 98 °F (36.7 °C)   Resp 18   SpO2 96%   Oxygen Saturation Interpretation: Normal      ------------------------------------------ PROGRESS NOTES ------------------------------------------  3:18 PM EDT  I have spoken with the patient and family if present and discussed todays results, in addition to providing specific details for the plan of care and counseling regarding the diagnosis and prognosis. Their questions are answered at this time and they are agreeable with the plan. I discussed at length with them reasons for immediate return here for re evaluation. They will followup with their primary care provider by calling their office as soon as possible.      --------------------------------- ADDITIONAL PROVIDER NOTES ---------------------------------  At this time the patient is without objective evidence of an acute process requiring hospitalization or inpatient management. They have remained hemodynamically stable throughout their entire ED visit and are stable for discharge with outpatient follow-up. The plan has been discussed in detail and they are aware of the specific conditions for emergent return, as well as the importance of follow-up. New Prescriptions    No medications on file       Diagnosis:  1. Fall, initial encounter    2. Hyperglycemia        Disposition:  Patient's disposition: Discharge to home  Patient's condition is stable.          Francesco Scales MD  Resident  08/02/22 8796

## 2022-08-02 NOTE — ED NOTES
REPORT GIVEN TO SORAYA AT [facility], RN  08/02/22 5213

## 2022-08-02 NOTE — ED PROVIDER NOTES
ATTENDING PROVIDER ATTESTATION:     Betzaida Santiago presented to the emergency department for evaluation of Fall (Patient sent from [facility]. Per facility patient [facility] and was sitting in wheelchair and slid out of wheel chair. Denies hitting head patient not on any blood thinners. Patient c/o right knee pain )   and was initially evaluated by the Medical Resident. See Original ED Note for H&P and ED course above. I have reviewed and discussed the case, including pertinent history (medical, surgical, family and social) and exam findings with the Medical Resident assigned to Betzaida Santiago. I have personally performed and/or participated in the history, exam, medical decision making, and procedures and agree with all pertinent clinical information and any additional changes or corrections are noted below in my assessment and plan. I have discussed this patient in detail with the resident, and provided the instruction and education,       I have reviewed my findings and recommendations with the assigned Medical Resident, Betzaida Santiago and members of family present at the time of disposition. I have performed a history and physical examination of this patient and directly supervised the resident caring for this patient      History of Present Illness:    Presents to the ED for fall, beginning prior to arrival.  The complaint has been constant, mild in severity, and worsened by nothing. Fall prior to arrival. He is non verbal. He he complains of knee pain and right ankle pain. He is moving his leg normally. He is able to lift his knee as well. No other appreciable pain.  Nothing else is tender on exam.      ENCOUNTER LIMITATION:    Please note that the HPI, ROS, Past History, and Physical Examination are limited due to this patients acuity of illness and communication barrier non verbal state.             --------------------------------------------- PAST GCS 15,    Psychiatric: Normal Affect      I directly supervised any procedures performed by the resident and was present for the procedure including all critical portions of the procedure             I, Dr. Natalie Rosales, am the primary provider of record    My Medical Decision Making:         Fall  Imaging reassuring  Hyperglycemia, not in DKA  No indication for admission        1. Fall, initial encounter    2.  Hyperglycemia                 Tonya Iglesias MD  08/02/22 2704

## 2022-08-02 NOTE — DISCHARGE INSTRUCTIONS
Return if any new or worsening symptoms. Return if any chest pain or shortness of breath. Follow-up with your primary care provider. XR KNEE RIGHT (3 VIEWS)   Final Result   Moderate degenerative changes seen in the right knee with no acute bony   abnormality. XR HIP 2-3 VW W PELVIS RIGHT   Final Result   Hardware is unchanged in positioning. There is no acute bony abnormality   with moderate degenerative changes seen within the hip. XR ANKLE RIGHT (MIN 3 VIEWS)   Final Result   Degenerative changes seen within the right ankle with no evidence of acute   bony abnormality. CT Head WO Contrast   Final Result   1. There is no acute intracranial hemorrhage or acute intracranial   abnormality. 2. 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 There is no acute compression fracture or subluxation of the cervical   spine. 2. Mild multilevel degenerative disc and degenerative joint disease.

## 2023-07-13 ENCOUNTER — APPOINTMENT (OUTPATIENT)
Dept: GENERAL RADIOLOGY | Age: 53
End: 2023-07-13

## 2023-07-13 ENCOUNTER — HOSPITAL ENCOUNTER (EMERGENCY)
Age: 53
Discharge: HOME | End: 2023-07-14
Attending: EMERGENCY MEDICINE

## 2023-07-13 DIAGNOSIS — Z76.89 ENCOUNTER FOR PSYCHIATRIC ASSESSMENT: Primary | ICD-10-CM

## 2023-07-13 LAB
ALBUMIN SERPL-MCNC: 4.6 G/DL (ref 3.5–5.2)
ALP SERPL-CCNC: 143 U/L (ref 40–129)
ALT SERPL-CCNC: 24 U/L (ref 0–40)
AMPHET UR QL SCN: NOT DETECTED
ANION GAP SERPL CALCULATED.3IONS-SCNC: 17 MMOL/L (ref 7–16)
APAP SERPL-MCNC: <5 MCG/ML (ref 10–30)
AST SERPL-CCNC: 17 U/L (ref 0–39)
BARBITURATES UR QL SCN: NOT DETECTED
BASOPHILS # BLD: 0.06 E9/L (ref 0–0.2)
BASOPHILS NFR BLD: 0.6 % (ref 0–2)
BENZODIAZ UR QL SCN: NOT DETECTED
BILIRUB SERPL-MCNC: 0.4 MG/DL (ref 0–1.2)
BUN SERPL-MCNC: 16 MG/DL (ref 6–20)
CALCIUM SERPL-MCNC: 9.7 MG/DL (ref 8.6–10.2)
CANNABINOIDS UR QL SCN: NOT DETECTED
CHLORIDE SERPL-SCNC: 99 MMOL/L (ref 98–107)
CO2 SERPL-SCNC: 20 MMOL/L (ref 22–29)
COCAINE UR QL SCN: NOT DETECTED
CREAT SERPL-MCNC: 1 MG/DL (ref 0.7–1.2)
DRUG SCREEN COMMENT UR-IMP: NORMAL
EOSINOPHIL # BLD: 0.13 E9/L (ref 0.05–0.5)
EOSINOPHIL NFR BLD: 1.3 % (ref 0–6)
ERYTHROCYTE [DISTWIDTH] IN BLOOD BY AUTOMATED COUNT: 12.3 FL (ref 11.5–15)
ETHANOLAMINE SERPL-MCNC: <10 MG/DL (ref 0–0.08)
FENTANYL SCREEN, URINE: NOT DETECTED
GLUCOSE SERPL-MCNC: 354 MG/DL (ref 74–99)
HCT VFR BLD AUTO: 46.1 % (ref 37–54)
HGB BLD-MCNC: 15.7 G/DL (ref 12.5–16.5)
IMM GRANULOCYTES # BLD: 0.04 E9/L
IMM GRANULOCYTES NFR BLD: 0.4 % (ref 0–5)
LYMPHOCYTES # BLD: 3.2 E9/L (ref 1.5–4)
LYMPHOCYTES NFR BLD: 31.3 % (ref 20–42)
MCH RBC QN AUTO: 27.6 PG (ref 26–35)
MCHC RBC AUTO-ENTMCNC: 34.1 % (ref 32–34.5)
MCV RBC AUTO: 81 FL (ref 80–99.9)
METER GLUCOSE: 392 MG/DL (ref 74–99)
METHADONE UR QL SCN: NOT DETECTED
MONOCYTES # BLD: 0.91 E9/L (ref 0.1–0.95)
MONOCYTES NFR BLD: 8.9 % (ref 2–12)
NEUTROPHILS # BLD: 5.88 E9/L (ref 1.8–7.3)
NEUTS SEG NFR BLD: 57.5 % (ref 43–80)
OPIATES UR QL SCN: NOT DETECTED
OXYCODONE URINE: NOT DETECTED
PCP UR QL SCN: NOT DETECTED
PLATELET # BLD AUTO: 249 E9/L (ref 130–450)
PMV BLD AUTO: 9.8 FL (ref 7–12)
POTASSIUM SERPL-SCNC: 3.9 MMOL/L (ref 3.5–5)
PROT SERPL-MCNC: 8.5 G/DL (ref 6.4–8.3)
RBC # BLD AUTO: 5.69 E12/L (ref 3.8–5.8)
SALICYLATES SERPL-MCNC: <0.3 MG/DL (ref 0–30)
SODIUM SERPL-SCNC: 136 MMOL/L (ref 132–146)
TRICYCLIC ANTIDEPRESSANTS SCREEN SERUM: NEGATIVE NG/ML
WBC # BLD: 10.2 E9/L (ref 4.5–11.5)

## 2023-07-13 PROCEDURE — 82962 GLUCOSE BLOOD TEST: CPT

## 2023-07-13 PROCEDURE — 85025 COMPLETE CBC W/AUTO DIFF WBC: CPT

## 2023-07-13 PROCEDURE — 80307 DRUG TEST PRSMV CHEM ANLYZR: CPT

## 2023-07-13 PROCEDURE — 96372 THER/PROPH/DIAG INJ SC/IM: CPT

## 2023-07-13 PROCEDURE — 99285 EMERGENCY DEPT VISIT HI MDM: CPT

## 2023-07-13 PROCEDURE — 73130 X-RAY EXAM OF HAND: CPT

## 2023-07-13 PROCEDURE — 80143 DRUG ASSAY ACETAMINOPHEN: CPT

## 2023-07-13 PROCEDURE — 6360000002 HC RX W HCPCS: Performed by: EMERGENCY MEDICINE

## 2023-07-13 PROCEDURE — 80179 DRUG ASSAY SALICYLATE: CPT

## 2023-07-13 PROCEDURE — 80053 COMPREHEN METABOLIC PANEL: CPT

## 2023-07-13 PROCEDURE — 82077 ASSAY SPEC XCP UR&BREATH IA: CPT

## 2023-07-13 PROCEDURE — 93005 ELECTROCARDIOGRAM TRACING: CPT | Performed by: EMERGENCY MEDICINE

## 2023-07-13 RX ORDER — HALOPERIDOL 5 MG/ML
5 INJECTION INTRAMUSCULAR ONCE
Status: COMPLETED | OUTPATIENT
Start: 2023-07-13 | End: 2023-07-13

## 2023-07-13 RX ORDER — LORAZEPAM 2 MG/ML
1 INJECTION INTRAMUSCULAR EVERY 6 HOURS PRN
Status: DISCONTINUED | OUTPATIENT
Start: 2023-07-13 | End: 2023-07-14 | Stop reason: HOSPADM

## 2023-07-13 RX ADMIN — LORAZEPAM 1 MG: 2 INJECTION INTRAMUSCULAR; INTRAVENOUS at 21:57

## 2023-07-13 RX ADMIN — HALOPERIDOL LACTATE 5 MG: 5 INJECTION, SOLUTION INTRAMUSCULAR at 21:57

## 2023-07-14 VITALS
RESPIRATION RATE: 16 BRPM | OXYGEN SATURATION: 98 % | DIASTOLIC BLOOD PRESSURE: 89 MMHG | TEMPERATURE: 97.5 F | SYSTOLIC BLOOD PRESSURE: 150 MMHG | HEART RATE: 84 BPM

## 2023-07-14 LAB
EKG ATRIAL RATE: 123 BPM
EKG P AXIS: 55 DEGREES
EKG P-R INTERVAL: 156 MS
EKG Q-T INTERVAL: 326 MS
EKG QRS DURATION: 78 MS
EKG QTC CALCULATION (BAZETT): 466 MS
EKG R AXIS: -25 DEGREES
EKG T AXIS: 100 DEGREES
EKG VENTRICULAR RATE: 123 BPM
METER GLUCOSE: 228 MG/DL (ref 74–99)

## 2023-07-14 PROCEDURE — 93010 ELECTROCARDIOGRAM REPORT: CPT | Performed by: INTERNAL MEDICINE

## 2023-07-14 PROCEDURE — 82962 GLUCOSE BLOOD TEST: CPT

## 2023-07-14 NOTE — ED NOTES
I checked on pt once again. He communicates well with hand gestures. Pt is doing good, but he is uncomfortable in the ER bed. He pointed to his wrist (where his watch would be) and shook his hand/fingers as if to \"go\". I asked him if he was asking what time is he going back to the nursing home - and he agreed that he was indicating just that. I advised pt that there is a delay - and that we will work on his plan soon. He remains stable, non-combative, still denying SI, no Hi and no hallucinations. He is pleasant. Pt is asking to go back to the nursing home.      RYNE Burgess  07/14/23 0318

## 2023-07-14 NOTE — ED NOTES
Pt has been resting quietly and comfortably, still without complaints.  remains at bedside and reports no issues since prior assessment.       Roya Ag RN  07/14/23 3397
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
## 2023-07-14 NOTE — ED NOTES
ACCESS CENTER UPDATE:     [facility]- declined due to insurance  [address]- declined due to [place]- declined due to being inappropriate for the unit       CHEMO Sterling, [facility]  07/14/23 7001

## 2023-07-14 NOTE — DISCHARGE INSTRUCTIONS
Return back to [facility] for continued care and constant supervision for [address] [place] [address] [phone] or [phone] or [phone]   24 hour crisis line for the following counties  [place]  [facility] Ramon Rich    PEER WARM LINE: [phone]  Monday through Friday 4pm to 8pm and Saturday 1pm-3pm   You can call during these times to talk with a peer support person as needed

## 2023-07-14 NOTE — ED NOTES
[facility] IS FULL AND UNABLE TO ACCOMMODATE    [facility] WITH [address]     [facility], [facility]  07/14/23 1014

## 2023-07-14 NOTE — ED NOTES
Pt resting in bed. Co still at bedside. Displaying appropriate behavior. Take removed from pt's chest that was attached to chest hair.   When removed, pt grimaced quickly but made no signs of agitation or aggression      Desmond Baumgarten, RN  07/14/23 7518

## 2023-07-14 NOTE — ED NOTES
Presented patient to Roro Damian NP, to be referred out for treatment.      Celeste De La Garza RN  07/14/23 1536

## 2023-07-14 NOTE — ED NOTES
Pt took to room 4 to be changed. Pt informed that he will be returning to [facility] and awaiting ambulance . Pt still presents with appropriate non-aggressive behavior. Calm, good spirit and attitude. Complaint with care and waiting.        Lacey Rubin RN  07/14/23 Yomi Gomes RN  07/14/23 1537

## 2023-07-14 NOTE — ED NOTES
Pt laying in bed,  CO at bedside, pt is still calm and cooperative. One blanket applied to pt.         Malu Cassidy RN  07/14/23 3772

## 2023-07-14 NOTE — ED NOTES
Mariela Boyd at the access center is calling [address] a referral         [place], [place]  07/14/23 3696

## 2023-07-14 NOTE — ED NOTES
Pt left with PAS. Still displayed no signs of aggression. Melida West NP and gave N2N who states that she believes the pt would not show these behaviors at our facility. [address] and spoke to [place], [place] gave K0M.   Informed her that N2N was given to RAPHAEL Fernández also      Denton Cueto RN  07/14/23 0648

## 2023-07-14 NOTE — ED NOTES
Haldol and ativan given for agitation, swinging arm and leg towards me as I tried to put tourniquet on for blood draw.      Celeste De La Garza RN  07/13/23 7380

## 2023-07-14 NOTE — ED NOTES
NABIL spoke to [employer] from the access center and was informed Pt was declined from [facility] and [facility] due to insurance. Pt is from [address] [place] [address] facility    Per facility paperwork Pt has [facility] with Medicaid # [id number]    NABIL spoke to registration and insurance is unable to be verified. No card on file at this time. NABIL reached out to [facility] at [phone] nursing 2 unit. NABIL spoke to staffing and was instructed to call back after 8:30AM to speak to a .                 CHEMO Leblanc, [place]  07/14/23 7267

## 2023-07-14 NOTE — ED NOTES
Pt still laying in bed. Smiling, no complaints.   CO at bedside      Oksana Mcbride, SHAKIR  07/14/23 4945

## 2023-07-14 NOTE — ED NOTES
Spoke to Will at [facility] to refer patient out for treatment.      Alice Peter RN  07/14/23 7322

## 2023-07-14 NOTE — ED NOTES
Received patient report from [facility] who reports no overnight issues. Pt is resting comfortably in bed, eyes closed. Sitter at bedside. One warm blanket applied. Patient currently denies SI/HI but is unhappy at his current facility. Pt has no complaints at this time.       Doug Stokes RN  07/14/23 5170

## 2023-07-14 NOTE — ED NOTES
CALL PLACED TO TRUDI LEHMAN -[phone]- NO ANSWER, LEFT MESSAGE ADVISING THAT I WAS ASKED TO CALL TO GIVE AN UPDATE     THE ACCESS CENTER CONTINUED A BED SEARCH WITH NO SUCCESS:  [address]    PT REMAINS STABLE - NO SI, NO HI AND NO HALLUCINATIONS    [facility] CONTACTED - ETA [address] WITH ANOTHER N2N [address] Yesenia Herrera  07/14/23 2513

## 2023-07-14 NOTE — ED NOTES
Patient would not allow xray to complete xray of finger. They will come back later to try.      Agnes Meyers RN  07/13/23 2666

## 2023-07-14 NOTE — ED NOTES
Assumed care with Tylor Becerril RN from [place], [place].  Pt resting in bed. Denies SI/HI, hallucinations. Pt has CO at bedside.   Pt is calm cooperative, displays appropriate behaviors      Verlin Room, RN  07/14/23 9977

## 2023-07-14 NOTE — ED NOTES
CO at bedside. Pt requesting another blanket. Warm blanket applied. All needs met at this time. Pt smiling.  Noncombative, non aggressive, appropriate behavior      Isaias Alba RN  07/14/23 9380

## 2023-07-14 NOTE — ED NOTES
NABIL faxed over [facility] to [facility].       CHEMO Andres, [place]  07/14/23 7870

## 2023-07-14 NOTE — ED NOTES
Nurse to nurse given to Jonathan Quinonez at [facility], [address]  07/14/23 0777

## 2023-07-14 NOTE — ED NOTES
Pt sitting in bed, CO at bedside. pt expressed needs of needing to be changed. Pt taken to room 4. Incon of urine. No complaints, compliant with care, non aggressive, smiling and making appropriate gestured communication.       Maulik Brandt RN  07/14/23 3195

## 2023-07-14 NOTE — ED NOTES
Марина Barber, [facility] manager requesting a N2N to their NP    Updated Hector Monteiro, RN - [facility] also requested that [address] be contacted prior to pt returning back.       Tabitha Ramos, RYNE  07/14/23 2637

## 2023-07-14 NOTE — ED NOTES
Pt has been denied at several facilities, some due to insurance and others due his level of care needed. However, Pt has been observed in the ER for almost 12 hours. His behavior has been controlled, and no behavior outburst have been noted. Pt has been resting and his agitation level seems to have stabilized at this time. I met with pt, asked direct \"yes or no\" questions. At this time, pt admits that he is unhappy at the nursing home. He is denying any thoughts, intent or plan to kill himself or anyone else. He is angry about his current physical limitations and medical situation. Pt agreed that his family is aware of his situation and are working on getting him to another facility. Pt is agreeable to return back to the nursing and agreed to be non-aggressive. I called Margaret Glasgow, pt's sister, [phone], received no answer and unable to leave a voicemail    Discussed with Dr. Rian Davis.     Arranged transport through [facility] by [address] [place] [address] RYNE Benitez  07/14/23 0742

## 2023-07-14 NOTE — ED NOTES
Received a call from [address] at the [facility] center. [facility] would like a copy of his pink slip, EKG and any advanced directives if he has them. This is to be faxed to [phone].      Laine Dillon RN  07/14/23 1759
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
## 2023-10-17 ENCOUNTER — HOSPITAL ENCOUNTER (EMERGENCY)
Age: 53
Discharge: HOSPICE | End: 2023-10-17
Attending: EMERGENCY MEDICINE
Payer: MEDICAID

## 2023-10-17 ENCOUNTER — APPOINTMENT (OUTPATIENT)
Dept: CT IMAGING | Age: 53
End: 2023-10-17
Payer: MEDICAID

## 2023-10-17 ENCOUNTER — APPOINTMENT (OUTPATIENT)
Dept: GENERAL RADIOLOGY | Age: 53
End: 2023-10-17
Payer: MEDICAID

## 2023-10-17 VITALS
TEMPERATURE: 98.8 F | HEIGHT: 70 IN | OXYGEN SATURATION: 96 % | RESPIRATION RATE: 14 BRPM | SYSTOLIC BLOOD PRESSURE: 129 MMHG | WEIGHT: 171 LBS | DIASTOLIC BLOOD PRESSURE: 73 MMHG | BODY MASS INDEX: 24.48 KG/M2 | HEART RATE: 82 BPM

## 2023-10-17 DIAGNOSIS — T14.8XXA ABRASION: ICD-10-CM
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
## 2023-10-17 PROCEDURE — 71250 CT THORAX DX C-: CPT

## 2023-10-17 PROCEDURE — 70450 CT HEAD/BRAIN W/O DYE: CPT

## 2023-10-17 PROCEDURE — 72125 CT NECK SPINE W/O DYE: CPT

## 2023-10-17 PROCEDURE — 99284 EMERGENCY DEPT VISIT MOD MDM: CPT

## 2023-10-17 ASSESSMENT — LIFESTYLE VARIABLES
HOW MANY STANDARD DRINKS CONTAINING ALCOHOL DO YOU HAVE ON A TYPICAL DAY: PATIENT DOES NOT DRINK
HOW OFTEN DO YOU HAVE A DRINK CONTAINING ALCOHOL: NEVER

## 2023-10-17 NOTE — ED NOTES
Report called to [address] [place] [address] to [employer], [place], [place]  10/17/23 7785

## 2023-10-17 NOTE — ED PROVIDER NOTES
[address] ENCOUNTER        Pt Name: Gio Castro  MRN: [id number]  [address] [facility] [place] 1970  Date of evaluation: 10/17/2023  Provider: Brenton Munoz DO  PCP: Kayleigh Quinn MD  Note Started: 4:36 AM EDT 10/17/23    CHIEF COMPLAINT       Chief Complaint   Patient presents with    Fall     Unwitnessed fall from [facility]. Patient nonverbal at baseline but shakes head for yes/no questions. Bruise on left side. HISTORY OF PRESENT ILLNESS: 1 or more Elements   History From: Patient    Limitations to history : None    Gio Castro is a 52 y.o. male who presents to the emergency department after a fall. The patient is from [facility] and came out on his wheelchair to get something and then they heard a fall. The patient sustained an unwitnessed fall from his wheelchair. Patient is nonverbal but is able to shake his head yes and no. Patient was noted to have abrasion to his left lateral chest therefore patient was sent in for further evaluation. Nursing Notes were all reviewed and agreed with or any disagreements were addressed in the HPI.       REVIEW OF EXTERNAL NOTE :       PDMP Monitoring:    Last PDMP Jayjay as Reviewed:  Review User Review Instant Review Result            Urine Drug Screenings (1 yr)       URINE DRUG SCREEN  Collected: 7/13/2023 10:00 PM (Final result)              URINE DRUG SCREEN  Collected: 7/21/2022  4:34 PM (Final result)              URINE DRUG SCREEN  Collected: 7/19/2022 12:32 AM (Final result)              SERUM DRUG SCREEN  Collected: 7/13/2023 10:00 PM (Final result)              Serum Drug Screen  Collected: 7/21/2022  4:34 PM (Final result)              Serum Drug Screen  Collected: 7/18/2022  8:54 PM (Final result)                  Medication Contract and Consent for Opioid Use Documents Filed        No documents found                      REVIEW OF SYSTEMS :

## 2023-11-29 ENCOUNTER — HOSPITAL ENCOUNTER (EMERGENCY)
Age: 53
Discharge: HOME | End: 2023-11-30
Attending: EMERGENCY MEDICINE
Payer: MEDICAID

## 2023-11-29 DIAGNOSIS — R46.89 AGGRESSIVE BEHAVIOR: Primary | ICD-10-CM

## 2023-11-29 LAB
ALBUMIN SERPL-MCNC: 4.3 G/DL (ref 3.5–5.2)
ALP SERPL-CCNC: 129 U/L (ref 40–129)
ALT SERPL-CCNC: 24 U/L (ref 0–40)
AMPHET UR QL SCN: NEGATIVE
ANION GAP SERPL CALCULATED.3IONS-SCNC: 16 MMOL/L (ref 7–16)
APAP SERPL-MCNC: <5 UG/ML (ref 10–30)
AST SERPL-CCNC: 18 U/L (ref 0–39)
BARBITURATES UR QL SCN: NEGATIVE
BASOPHILS # BLD: 0.04 K/UL (ref 0–0.2)
BASOPHILS NFR BLD: 1 % (ref 0–2)
BENZODIAZ UR QL: NEGATIVE
BILIRUB SERPL-MCNC: 0.3 MG/DL (ref 0–1.2)
BILIRUB UR QL STRIP: NEGATIVE
BUN SERPL-MCNC: 12 MG/DL (ref 6–20)
BUPRENORPHINE UR QL: NEGATIVE
CALCIUM SERPL-MCNC: 9.4 MG/DL (ref 8.6–10.2)
CANNABINOIDS UR QL SCN: NEGATIVE
CHLORIDE SERPL-SCNC: 103 MMOL/L (ref 98–107)
CLARITY UR: CLEAR
CO2 SERPL-SCNC: 21 MMOL/L (ref 22–29)
COCAINE UR QL SCN: NEGATIVE
COLOR UR: YELLOW
COMMENT: ABNORMAL
CREAT SERPL-MCNC: 1.1 MG/DL (ref 0.7–1.2)
EOSINOPHIL # BLD: 0.06 K/UL (ref 0.05–0.5)
EOSINOPHILS RELATIVE PERCENT: 1 % (ref 0–6)
ERYTHROCYTE [DISTWIDTH] IN BLOOD BY AUTOMATED COUNT: 12.5 % (ref 11.5–15)
ETHANOLAMINE SERPL-MCNC: <10 MG/DL
FENTANYL UR QL: NEGATIVE
GFR SERPL CREATININE-BSD FRML MDRD: >60 ML/MIN/1.73M2
GLUCOSE SERPL-MCNC: 258 MG/DL (ref 74–99)
GLUCOSE UR STRIP-MCNC: >=1000 MG/DL
HCT VFR BLD AUTO: 42.2 % (ref 37–54)
HGB BLD-MCNC: 14.8 G/DL (ref 12.5–16.5)
HGB UR QL STRIP.AUTO: NEGATIVE
IMM GRANULOCYTES # BLD AUTO: 0.05 K/UL (ref 0–0.58)
IMM GRANULOCYTES NFR BLD: 1 % (ref 0–5)
KETONES UR STRIP-MCNC: ABNORMAL MG/DL
LEUKOCYTE ESTERASE UR QL STRIP: NEGATIVE
LYMPHOCYTES NFR BLD: 1.74 K/UL (ref 1.5–4)
LYMPHOCYTES RELATIVE PERCENT: 20 % (ref 20–42)
MCH RBC QN AUTO: 28.8 PG (ref 26–35)
MCHC RBC AUTO-ENTMCNC: 35.1 G/DL (ref 32–34.5)
MCV RBC AUTO: 82.3 FL (ref 80–99.9)
METHADONE UR QL: NEGATIVE
MONOCYTES NFR BLD: 0.56 K/UL (ref 0.1–0.95)
MONOCYTES NFR BLD: 6 % (ref 2–12)
NEUTROPHILS NFR BLD: 72 % (ref 43–80)
NEUTS SEG NFR BLD: 6.36 K/UL (ref 1.8–7.3)
NITRITE UR QL STRIP: NEGATIVE
OPIATES UR QL SCN: NEGATIVE
OXYCODONE UR QL SCN: NEGATIVE
PCP UR QL SCN: NEGATIVE
PH UR STRIP: 6 [PH] (ref 5–9)
PLATELET # BLD AUTO: 202 K/UL (ref 130–450)
PMV BLD AUTO: 9.5 FL (ref 7–12)
POTASSIUM SERPL-SCNC: 4.5 MMOL/L (ref 3.5–5)
PROT SERPL-MCNC: 7.7 G/DL (ref 6.4–8.3)
PROT UR STRIP-MCNC: NEGATIVE MG/DL
RBC # BLD AUTO: 5.13 M/UL (ref 3.8–5.8)
SALICYLATES SERPL-MCNC: <0.3 MG/DL (ref 0–30)
SODIUM SERPL-SCNC: 140 MMOL/L (ref 132–146)
SP GR UR STRIP: <1.005 (ref 1–1.03)
TEST INFORMATION: NORMAL
TOXIC TRICYCLIC SC,BLOOD: NEGATIVE
UROBILINOGEN UR STRIP-ACNC: 0.2 EU/DL (ref 0–1)
WBC OTHER # BLD: 8.8 K/UL (ref 4.5–11.5)

## 2023-11-29 PROCEDURE — 85025 COMPLETE CBC W/AUTO DIFF WBC: CPT

## 2023-11-29 PROCEDURE — 80179 DRUG ASSAY SALICYLATE: CPT

## 2023-11-29 PROCEDURE — 93005 ELECTROCARDIOGRAM TRACING: CPT | Performed by: EMERGENCY MEDICINE

## 2023-11-29 PROCEDURE — 81003 URINALYSIS AUTO W/O SCOPE: CPT

## 2023-11-29 PROCEDURE — G0480 DRUG TEST DEF 1-7 CLASSES: HCPCS

## 2023-11-29 PROCEDURE — 99284 EMERGENCY DEPT VISIT MOD MDM: CPT

## 2023-11-29 PROCEDURE — 80053 COMPREHEN METABOLIC PANEL: CPT

## 2023-11-29 PROCEDURE — 80307 DRUG TEST PRSMV CHEM ANLYZR: CPT

## 2023-11-29 PROCEDURE — 80143 DRUG ASSAY ACETAMINOPHEN: CPT

## 2023-11-29 RX ORDER — DROPERIDOL 2.5 MG/ML
5 INJECTION, SOLUTION INTRAMUSCULAR; INTRAVENOUS ONCE
Status: DISCONTINUED | OUTPATIENT
Start: 2023-11-29 | End: 2023-11-30 | Stop reason: HOSPADM

## 2023-11-29 RX ORDER — MIDAZOLAM HYDROCHLORIDE 2 MG/2ML
5 INJECTION, SOLUTION INTRAMUSCULAR; INTRAVENOUS ONCE
Status: DISCONTINUED | OUTPATIENT
Start: 2023-11-29 | End: 2023-11-30 | Stop reason: HOSPADM

## 2023-11-30 VITALS
TEMPERATURE: 98 F | RESPIRATION RATE: 14 BRPM | OXYGEN SATURATION: 97 % | DIASTOLIC BLOOD PRESSURE: 101 MMHG | SYSTOLIC BLOOD PRESSURE: 161 MMHG | HEART RATE: 61 BPM

## 2023-11-30 LAB
EKG ATRIAL RATE: 67 BPM
EKG P AXIS: 51 DEGREES
EKG P-R INTERVAL: 174 MS
EKG Q-T INTERVAL: 420 MS
EKG QRS DURATION: 84 MS
EKG QTC CALCULATION (BAZETT): 443 MS
EKG R AXIS: -13 DEGREES
EKG T AXIS: 4 DEGREES
EKG VENTRICULAR RATE: 67 BPM

## 2023-11-30 PROCEDURE — 93010 ELECTROCARDIOGRAM REPORT: CPT | Performed by: INTERNAL MEDICINE

## 2023-11-30 ASSESSMENT — PAIN SCALES - GENERAL: PAINLEVEL_OUTOF10: 5

## 2023-11-30 NOTE — ED PROVIDER NOTES
program.  Efforts were made to edit the dictations but occasionally words are mis-transcribed.)    Marcella Garzon DO (electronically signed)           Justino Ramires DO  11/29/23 5004
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
## 2023-11-30 NOTE — ED NOTES
Attempted communication with  pad. Pt remains non verbal. Pt attempts to communicate . Pointing with his left arm and right arm contracted. Pt will point and pull at linin near his jose area when asking to be changed. This rn provided warm wash rags and pt cleaned his per area himself. Co personal at bedside. While under this rn care and while with co no behavior out burst noted other than presenting to be frustrated when he cannot communicate. Abd soft and non distended. Bs active. Side rales up times two. Bed locked and low. Continuing to monitor.       Ben Hollingsworth RN  11/30/23 0994

## 2023-11-30 NOTE — ED NOTES
Pt care report received from [facility], assumed Pt care at this time. Pt resting comfortably showing no signs of distress.       Roseanne Dumont RN  11/29/23 1924

## 2023-11-30 NOTE — ED NOTES
Bedside hand off received from rn lawrence. No distress noted with pt and pt resting with eyes closed. Chest rises and falls.       Andria Epley, [place]  11/30/23 7834
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
## 2023-11-30 NOTE — ED NOTES
Nurse angélica notified that pt is returning back to [facility] and report was given.       Ho Segura RN  11/30/23 5448

## 2023-11-30 NOTE — ED NOTES
Patient to be assessed by Psych SW in the AM due to patient presenting problem/history.     Electronically signed by CHEMO Baca, RYNE on 11/29/2023 at 10:32 PM

## 2023-12-04 ENCOUNTER — HOSPITAL ENCOUNTER (EMERGENCY)
Age: 53
Discharge: HOME | End: 2023-12-05
Attending: EMERGENCY MEDICINE
Payer: MEDICAID

## 2023-12-04 ENCOUNTER — APPOINTMENT (OUTPATIENT)
Dept: CT IMAGING | Age: 53
End: 2023-12-04
Attending: EMERGENCY MEDICINE
Payer: MEDICAID

## 2023-12-04 VITALS
RESPIRATION RATE: 18 BRPM | SYSTOLIC BLOOD PRESSURE: 177 MMHG | OXYGEN SATURATION: 96 % | TEMPERATURE: 98.5 F | DIASTOLIC BLOOD PRESSURE: 101 MMHG | HEART RATE: 65 BPM

## 2023-12-04 DIAGNOSIS — R45.1 AGITATION: Primary | ICD-10-CM

## 2023-12-04 DIAGNOSIS — Z86.73 HISTORY OF CVA (CEREBROVASCULAR ACCIDENT): ICD-10-CM

## 2023-12-04 LAB
ANION GAP SERPL CALCULATED.3IONS-SCNC: 13 MMOL/L (ref 7–16)
BASOPHILS # BLD: 0.04 K/UL (ref 0–0.2)
BASOPHILS NFR BLD: 1 % (ref 0–2)
BUN SERPL-MCNC: 14 MG/DL (ref 6–20)
CALCIUM SERPL-MCNC: 9.2 MG/DL (ref 8.6–10.2)
CHLORIDE SERPL-SCNC: 102 MMOL/L (ref 98–107)
CO2 SERPL-SCNC: 22 MMOL/L (ref 22–29)
CREAT SERPL-MCNC: 0.9 MG/DL (ref 0.7–1.2)
EOSINOPHIL # BLD: 0.16 K/UL (ref 0.05–0.5)
EOSINOPHILS RELATIVE PERCENT: 3 % (ref 0–6)
ERYTHROCYTE [DISTWIDTH] IN BLOOD BY AUTOMATED COUNT: 12.5 % (ref 11.5–15)
GFR SERPL CREATININE-BSD FRML MDRD: >60 ML/MIN/1.73M2
GLUCOSE SERPL-MCNC: 206 MG/DL (ref 74–99)
HCT VFR BLD AUTO: 43.1 % (ref 37–54)
HGB BLD-MCNC: 14.8 G/DL (ref 12.5–16.5)
IMM GRANULOCYTES # BLD AUTO: <0.03 K/UL (ref 0–0.58)
IMM GRANULOCYTES NFR BLD: 0 % (ref 0–5)
LYMPHOCYTES NFR BLD: 2.23 K/UL (ref 1.5–4)
LYMPHOCYTES RELATIVE PERCENT: 35 % (ref 20–42)
MCH RBC QN AUTO: 28.5 PG (ref 26–35)
MCHC RBC AUTO-ENTMCNC: 34.3 G/DL (ref 32–34.5)
MCV RBC AUTO: 82.9 FL (ref 80–99.9)
MONOCYTES NFR BLD: 0.59 K/UL (ref 0.1–0.95)
MONOCYTES NFR BLD: 9 % (ref 2–12)
NEUTROPHILS NFR BLD: 53 % (ref 43–80)
NEUTS SEG NFR BLD: 3.37 K/UL (ref 1.8–7.3)
PLATELET # BLD AUTO: 195 K/UL (ref 130–450)
PMV BLD AUTO: 9.8 FL (ref 7–12)
POTASSIUM SERPL-SCNC: 3.9 MMOL/L (ref 3.5–5)
RBC # BLD AUTO: 5.2 M/UL (ref 3.8–5.8)
SODIUM SERPL-SCNC: 137 MMOL/L (ref 132–146)
WBC OTHER # BLD: 6.4 K/UL (ref 4.5–11.5)

## 2023-12-04 PROCEDURE — 85025 COMPLETE CBC W/AUTO DIFF WBC: CPT

## 2023-12-04 PROCEDURE — 70450 CT HEAD/BRAIN W/O DYE: CPT

## 2023-12-04 PROCEDURE — 99284 EMERGENCY DEPT VISIT MOD MDM: CPT

## 2023-12-04 PROCEDURE — 80048 BASIC METABOLIC PNL TOTAL CA: CPT

## 2023-12-04 ASSESSMENT — PAIN - FUNCTIONAL ASSESSMENT: PAIN_FUNCTIONAL_ASSESSMENT: NONE - DENIES PAIN

## 2023-12-04 NOTE — ED NOTES
Attempted to use interpretor during triage, patient not able to answer questions, patient nonverbal at baseline.       Ximena Vega RN  12/04/23 4861

## 2023-12-04 NOTE — ED PROVIDER NOTES
52-year-old male presenting from nursing home with initial complaint of needing a psychiatric evaluation. 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 His left arm and left leg he moves around without any problems. The mental health team initially said that he, as reported by the nursing home, refused to cooperate and wanted outside for cigarette. This prompted them to send him to the ER for mental health evaluation. Patient when asked questions he does not complain of anything. He is essentially nonverbal, he moves his arm and leg on the left side without any problems or difficulties. No any evidence of acute traumatic injury or problems otherwise. No family history on file. Past Surgical History:   Procedure Laterality Date    HIP FRACTURE SURGERY Right 5/20/2022    HIP OPEN REDUCTION INTERNAL FIXATION performed by Eugene Valdivia MD at [address] [place] [address]   Unable to perform ROS: Patient nonverbal        Physical Exam  Constitutional:       General: He is not in acute distress. Appearance: He is well-developed. Comments: Nonverbal   HENT:      Head: Normocephalic and atraumatic. Eyes:      Pupils: Pupils are equal, round, and reactive to light. Neck:      Thyroid: No thyromegaly. Cardiovascular:      Rate and Rhythm: Normal rate and regular rhythm. Pulmonary:      Effort: Pulmonary effort is normal. No respiratory distress. Breath sounds: Normal breath sounds. No wheezing. Abdominal:      General: There is no distension. Palpations: Abdomen is soft. There is no mass. Tenderness: There is no abdominal tenderness. There is no guarding or rebound. Musculoskeletal:         General: No tenderness. Cervical back: Normal range of motion and neck supple. Comments: Contracted right upper extremity, weakness in right lower extremity    Skin:     General: Skin is warm and dry.       Findings:

## 2023-12-04 NOTE — ED NOTES
Patient cleaned up for urinary incontinence, clean linen given.       Vikki Garcia RN  12/04/23 3385

## 2024-01-21 ENCOUNTER — HOSPITAL ENCOUNTER (EMERGENCY)
Age: 54
Discharge: OTHER INSTITUTION | End: 2024-01-22
Attending: EMERGENCY MEDICINE
Payer: MEDICAID

## 2024-01-21 DIAGNOSIS — F03.918: Primary | ICD-10-CM

## 2024-01-21 DIAGNOSIS — F03.93: Primary | ICD-10-CM

## 2024-01-21 DIAGNOSIS — R73.9 HYPERGLYCEMIA: ICD-10-CM

## 2024-01-21 LAB
ANION GAP SERPL CALCULATED.3IONS-SCNC: 13 MMOL/L (ref 7–16)
APAP SERPL-MCNC: <5 UG/ML (ref 10–30)
BASOPHILS # BLD: 0.03 K/UL (ref 0–0.2)
BASOPHILS NFR BLD: 1 % (ref 0–2)
BILIRUB UR QL STRIP: NEGATIVE
BUN SERPL-MCNC: 16 MG/DL (ref 6–20)
CALCIUM SERPL-MCNC: 8.8 MG/DL (ref 8.6–10.2)
CHLORIDE SERPL-SCNC: 102 MMOL/L (ref 98–107)
CLARITY UR: CLEAR
CO2 SERPL-SCNC: 24 MMOL/L (ref 22–29)
COLOR UR: YELLOW
COMMENT: ABNORMAL
CREAT SERPL-MCNC: 1 MG/DL (ref 0.7–1.2)
DATE LAST DOSE: ABNORMAL
EOSINOPHIL # BLD: 0.18 K/UL (ref 0.05–0.5)
EOSINOPHILS RELATIVE PERCENT: 3 % (ref 0–6)
ERYTHROCYTE [DISTWIDTH] IN BLOOD BY AUTOMATED COUNT: 12.4 % (ref 11.5–15)
ETHANOLAMINE SERPL-MCNC: <10 MG/DL
GFR SERPL CREATININE-BSD FRML MDRD: >60 ML/MIN/1.73M2
GLUCOSE SERPL-MCNC: 327 MG/DL (ref 74–99)
GLUCOSE UR STRIP-MCNC: >=1000 MG/DL
HCT VFR BLD AUTO: 41.6 % (ref 37–54)
HGB BLD-MCNC: 14.4 G/DL (ref 12.5–16.5)
HGB UR QL STRIP.AUTO: NEGATIVE
IMM GRANULOCYTES # BLD AUTO: 0.04 K/UL (ref 0–0.58)
IMM GRANULOCYTES NFR BLD: 1 % (ref 0–5)
KETONES UR STRIP-MCNC: ABNORMAL MG/DL
LEUKOCYTE ESTERASE UR QL STRIP: NEGATIVE
LYMPHOCYTES NFR BLD: 1.89 K/UL (ref 1.5–4)
LYMPHOCYTES RELATIVE PERCENT: 32 % (ref 20–42)
MCH RBC QN AUTO: 28.8 PG (ref 26–35)
MCHC RBC AUTO-ENTMCNC: 34.6 G/DL (ref 32–34.5)
MCV RBC AUTO: 83.2 FL (ref 80–99.9)
MONOCYTES NFR BLD: 0.68 K/UL (ref 0.1–0.95)
MONOCYTES NFR BLD: 11 % (ref 2–12)
NEUTROPHILS NFR BLD: 53 % (ref 43–80)
NEUTS SEG NFR BLD: 3.13 K/UL (ref 1.8–7.3)
NITRITE UR QL STRIP: NEGATIVE
PH UR STRIP: 6.5 [PH] (ref 5–9)
PLATELET # BLD AUTO: 176 K/UL (ref 130–450)
PMV BLD AUTO: 9.6 FL (ref 7–12)
POTASSIUM SERPL-SCNC: 4.2 MMOL/L (ref 3.5–5)
PROT UR STRIP-MCNC: NEGATIVE MG/DL
RBC # BLD AUTO: 5 M/UL (ref 3.8–5.8)
SALICYLATES SERPL-MCNC: <0.3 MG/DL (ref 0–30)
SODIUM SERPL-SCNC: 139 MMOL/L (ref 132–146)
SP GR UR STRIP: 1.01 (ref 1–1.03)
TME LAST DOSE: ABNORMAL H
TOXIC TRICYCLIC SC,BLOOD: NEGATIVE
UROBILINOGEN UR STRIP-ACNC: 0.2 EU/DL (ref 0–1)
VALPROATE SERPL-MCNC: 49 UG/ML (ref 50–100)
VANCOMYCIN DOSE: ABNORMAL MG
WBC OTHER # BLD: 6 K/UL (ref 4.5–11.5)

## 2024-01-21 PROCEDURE — 85025 COMPLETE CBC W/AUTO DIFF WBC: CPT

## 2024-01-21 PROCEDURE — 6370000000 HC RX 637 (ALT 250 FOR IP): Performed by: EMERGENCY MEDICINE

## 2024-01-21 PROCEDURE — 6360000002 HC RX W HCPCS

## 2024-01-21 PROCEDURE — 99285 EMERGENCY DEPT VISIT HI MDM: CPT

## 2024-01-21 PROCEDURE — 96374 THER/PROPH/DIAG INJ IV PUSH: CPT

## 2024-01-21 PROCEDURE — 6360000002 HC RX W HCPCS: Performed by: EMERGENCY MEDICINE

## 2024-01-21 PROCEDURE — 80307 DRUG TEST PRSMV CHEM ANLYZR: CPT

## 2024-01-21 PROCEDURE — 80164 ASSAY DIPROPYLACETIC ACD TOT: CPT

## 2024-01-21 PROCEDURE — 80179 DRUG ASSAY SALICYLATE: CPT

## 2024-01-21 PROCEDURE — 96372 THER/PROPH/DIAG INJ SC/IM: CPT

## 2024-01-21 PROCEDURE — G0480 DRUG TEST DEF 1-7 CLASSES: HCPCS

## 2024-01-21 PROCEDURE — 80048 BASIC METABOLIC PNL TOTAL CA: CPT

## 2024-01-21 PROCEDURE — 81003 URINALYSIS AUTO W/O SCOPE: CPT

## 2024-01-21 PROCEDURE — 80143 DRUG ASSAY ACETAMINOPHEN: CPT

## 2024-01-21 RX ORDER — HALOPERIDOL 5 MG/ML
INJECTION INTRAMUSCULAR
Status: DISPENSED
Start: 2024-01-21 | End: 2024-01-22

## 2024-01-21 RX ORDER — HYDROXYZINE HYDROCHLORIDE 50 MG/ML
INJECTION, SOLUTION INTRAMUSCULAR
Status: COMPLETED
Start: 2024-01-21 | End: 2024-01-21

## 2024-01-21 RX ORDER — DIPHENHYDRAMINE HYDROCHLORIDE 50 MG/ML
25 INJECTION INTRAMUSCULAR; INTRAVENOUS ONCE
Status: COMPLETED | OUTPATIENT
Start: 2024-01-21 | End: 2024-01-21

## 2024-01-21 RX ORDER — HALOPERIDOL 5 MG/ML
2.5 INJECTION INTRAMUSCULAR ONCE
Status: COMPLETED | OUTPATIENT
Start: 2024-01-21 | End: 2024-01-21

## 2024-01-21 RX ORDER — HYDROXYZINE HYDROCHLORIDE 50 MG/ML
50 INJECTION, SOLUTION INTRAMUSCULAR ONCE
Status: COMPLETED | OUTPATIENT
Start: 2024-01-21 | End: 2024-01-21

## 2024-01-21 RX ORDER — INSULIN GLARGINE 100 [IU]/ML
15 INJECTION, SOLUTION SUBCUTANEOUS ONCE
Status: COMPLETED | OUTPATIENT
Start: 2024-01-21 | End: 2024-01-21

## 2024-01-21 RX ORDER — DIPHENHYDRAMINE HYDROCHLORIDE 50 MG/ML
INJECTION INTRAMUSCULAR; INTRAVENOUS
Status: COMPLETED
Start: 2024-01-21 | End: 2024-01-21

## 2024-01-21 RX ADMIN — INSULIN GLARGINE 15 UNITS: 100 INJECTION, SOLUTION SUBCUTANEOUS at 20:56

## 2024-01-21 RX ADMIN — DIPHENHYDRAMINE HYDROCHLORIDE 25 MG: 50 INJECTION INTRAMUSCULAR; INTRAVENOUS at 22:26

## 2024-01-21 RX ADMIN — HYDROXYZINE HYDROCHLORIDE 50 MG: 50 INJECTION, SOLUTION INTRAMUSCULAR at 23:26

## 2024-01-21 RX ADMIN — HALOPERIDOL LACTATE 2.5 MG: 5 INJECTION, SOLUTION INTRAMUSCULAR at 22:10

## 2024-01-21 RX ADMIN — HALOPERIDOL LACTATE 2.5 MG: 5 INJECTION, SOLUTION INTRAMUSCULAR at 22:26

## 2024-01-22 VITALS
WEIGHT: 171 LBS | BODY MASS INDEX: 24.48 KG/M2 | HEIGHT: 70 IN | HEART RATE: 74 BPM | DIASTOLIC BLOOD PRESSURE: 88 MMHG | SYSTOLIC BLOOD PRESSURE: 136 MMHG | TEMPERATURE: 98.6 F | RESPIRATION RATE: 18 BRPM | OXYGEN SATURATION: 96 %

## 2024-01-22 LAB
AMPHET UR QL SCN: NEGATIVE
BARBITURATES UR QL SCN: NEGATIVE
BENZODIAZ UR QL: NEGATIVE
BUPRENORPHINE UR QL: NEGATIVE
CANNABINOIDS UR QL SCN: NEGATIVE
COCAINE UR QL SCN: NEGATIVE
EKG ATRIAL RATE: 79 BPM
EKG P AXIS: 55 DEGREES
EKG P-R INTERVAL: 180 MS
EKG Q-T INTERVAL: 420 MS
EKG QRS DURATION: 92 MS
EKG QTC CALCULATION (BAZETT): 481 MS
EKG R AXIS: -31 DEGREES
EKG T AXIS: 28 DEGREES
EKG VENTRICULAR RATE: 79 BPM
FENTANYL UR QL: NEGATIVE
GLUCOSE BLD-MCNC: 151 MG/DL (ref 74–99)
GLUCOSE BLD-MCNC: 182 MG/DL (ref 74–99)
METHADONE UR QL: NEGATIVE
OPIATES UR QL SCN: NEGATIVE
OXYCODONE UR QL SCN: NEGATIVE
PCP UR QL SCN: NEGATIVE
TEST INFORMATION: NORMAL

## 2024-01-22 PROCEDURE — 82962 GLUCOSE BLOOD TEST: CPT

## 2024-01-22 PROCEDURE — 93010 ELECTROCARDIOGRAM REPORT: CPT | Performed by: INTERNAL MEDICINE

## 2024-01-22 PROCEDURE — 93005 ELECTROCARDIOGRAM TRACING: CPT | Performed by: EMERGENCY MEDICINE

## 2024-01-22 RX ORDER — LORAZEPAM 2 MG/ML
2 INJECTION INTRAMUSCULAR ONCE
Status: DISCONTINUED | OUTPATIENT
Start: 2024-01-22 | End: 2024-01-22 | Stop reason: HOSPADM

## 2024-01-22 RX ORDER — DIPHENHYDRAMINE HYDROCHLORIDE 50 MG/ML
50 INJECTION INTRAMUSCULAR; INTRAVENOUS ONCE
Status: DISCONTINUED | OUTPATIENT
Start: 2024-01-22 | End: 2024-01-22 | Stop reason: HOSPADM
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
## 2024-01-22 NOTE — DISCHARGE INSTR - COC
Continuity of Care Form    Patient Name: Isra Schwartz   :  1970  MRN:  [id number]    Admit date:  2024  Discharge date:  ***    Code Status Order: Prior   Advance Directives:     Admitting Physician:  No admitting provider for patient encounter.  PCP: Kaylyn Alex MD    Discharging Nurse: ***  Discharging Hospital Unit/Room#: JENNIFER/JENNIFER  Discharging Unit Phone Number: ***    Emergency Contact:   Extended Emergency Contact Information  Primary Emergency Contact: Margareth Davison  Home Phone: [phone]  Relation: Brother/Sister  Preferred language: English   needed? No  Secondary Emergency Contact: Vee Dowd  Home Phone: [phone]  Relation: Neighbor    Past Surgical History:  Past Surgical History:   Procedure Laterality Date    HIP FRACTURE SURGERY Right 2022    HIP OPEN REDUCTION INTERNAL FIXATION performed by Jose F Hutchinson MD at [facility] [place]       Immunization History:   Immunization History   Administered Date(s) Administered    COVID-19, PFIZER PURPLE top, DILUTE for use, (age 12 y+), 30mcg/0.3mL 2021, 10/28/2021    TDaP, ADACEL (age 10y-64y), BOOSTRIX (age 10y+), IM, 0.5mL 2021       Active Problems:  Patient Active Problem List   Diagnosis Code    Failure to thrive in adult R62.7    Left hip pain M25.552    Renal cyst N28.1    Fracture of phalanx of left little finger S62.607A    Closed fracture of neck of right femur (HCC) S72.001A    History of CVA (cerebrovascular accident) Z86.73    History of seizures Z87.898    Primary hypertension I10    Type 2 diabetes mellitus (HCC) E11.9    Sepsis (HCC) A41.9       Isolation/Infection:   Isolation            No Isolation          Patient Infection Status       Infection Onset Added Last Indicated Last Indicated By Review Planned Expiration Resolved Resolved By    None active    Resolved    COVID-19 22 Respiratory Panel, Molecular, with COVID-19 (Restricted: peds pts or

## 2024-01-22 NOTE — DISCHARGE INSTRUCTIONS
RETURN BACK TO [facility]    CONTINUE WITH MENTAL HEALTH SERVICES AS SCHEDULED    Help Hotline 490 420-8178 or 1-315.434.4177 or 211   24 hour crisis line for the following counties  [facility]   www.helphotline.org    PEER WARM LINE: 8-916-595-2260  Monday through Friday 4pm to 8pm and Saturday 1pm-3pm   You can call during these times to talk with a peer support person as needed

## 2024-01-22 NOTE — ED NOTES
Care taken over at this time.   
Dr. Howard approached me and requested a re-eval on this pt, as he has a hx of some aggression and returning back to the nursing home.  Pt is not a risk at this time.    Pt has been observed in the ER for over 20 hours.  Although pt is non verbal, I spoke to him and he was able to nod for yes and no answers.  We communicated effectively.    Pt is aware and agreeable to returning  back to the nursing home.  I emphasized that his behavior needs to be remain non aggressive or her will risk returning back to the ER.      Pt seems to understand and PAS will transport by 1800    [place] slip over turned.    SHAKIR Bal updated    N2N [phone]  
Evaluate the patient because of some agitation.  I went to speak with him, he is in a brief, is minimally verbal, can only use his left arm.  He appears to require care for all aspects of his life.  Spoke to him in Croatian, he looked at me with recognition and keeps repeating the phrase \"go home.\"  No agitation now, resting comfortably.  Chart reviewed.  Spoke with [facility] social work, will discuss neck steps.    3:02 PM EST  No distress, resting comfortably.  Has a ride back to the facility, is at his baseline     Andrew Howard,   01/22/24 9790    
N2N called to facility. Transferred to admissions, LM.   
PAS ETA [phone]  
Patient changed from stool incontinence at this time.   
Patient changed from urinary incontinence at this time.   
Patient combative again, attempting to hit and kick staff as well as attempting to bite staff. Dr. Ortega notified.   
Patient continuing to be combative and found almost out of bed. Bed alarm on and Dr. Ortega notified.   
Patient now staying for social work consult, PAS cancelled.   
Patient turned around in bed, attempting to hit this RN and attempting to bite this RN. Other staff members brought to bedside to attempt to verbally calm down the patient. Dr. Ortega notified.   
Patient was seen by prior ED physician.  Patient was to be discharged back to facility.  While here in the emergency room patient became more agitated and attempting to get out of bed as well as attempting to bite staff.  Patient Ethiopian-speaking history is limited unable to even talk to the patient through  line.  Patient medicated with Haldol as well as Benadryl.  Patient was reassessed a little after 11:00 patient was still agitated and aggressive.  Vistaril was also ordered on the patient I ordered urine drug screen as well as urinalysis.  Plan will be for  to evaluate patient.   EKG:  This EKG is signed and interpreted by me.    Rate: 79  Rhythm: Sinus  Interpretation: non-specific EKG  Comparison: stable as compared to patient's most recent EKG compared to EKG from 11/29/2023  Urinalysis and drug screen noted urine was nitrite leukocyte esterase negative  Urine drug screen is also negative.  Patient while here in the emergency department was still agitated but did improve after Vistaril.  Patient was referred to .   did evaluate plan will be to transfer to psychiatric unit.  Patient is medically clear.  Roger Ortega MD  01/21/24 0164       Roger Ortega MD  01/22/24 8111    
Pt will be reviewed for psych admission once a bed is available.  
Report given to SHAKIR Cooney.   
SHAKIR Bal will review for psych admission  
This RN attempted to use the .  was unable to understand everything the patient was saying and stated that he had slurred speech. Patient did respond \"yes\" when asked if he could understand english. Patient following basic commands in english.   
recommended at this time:  [] Home:   [] Outpatient Provider:   [] Crisis Unit:   [x] Inpatient Psychiatric Unit:  [] Other:     SW spoke to Dr. Ortega who endorses referral to hospital for safety-monitoring/stabilization.

## 2024-04-30 ENCOUNTER — HOSPITAL ENCOUNTER (INPATIENT)
Age: 54
LOS: 5 days | Discharge: HOME | DRG: 720 | End: 2024-05-05
Attending: EMERGENCY MEDICINE | Admitting: INTERNAL MEDICINE
Payer: MEDICAID

## 2024-04-30 ENCOUNTER — APPOINTMENT (OUTPATIENT)
Dept: CT IMAGING | Age: 54
DRG: 720 | End: 2024-04-30
Payer: MEDICAID

## 2024-04-30 ENCOUNTER — APPOINTMENT (OUTPATIENT)
Dept: GENERAL RADIOLOGY | Age: 54
DRG: 720 | End: 2024-04-30
Payer: MEDICAID

## 2024-04-30 DIAGNOSIS — J96.01 ACUTE HYPOXIC RESPIRATORY FAILURE (HCC): ICD-10-CM

## 2024-04-30 DIAGNOSIS — R41.82 ALTERED MENTAL STATUS, UNSPECIFIED ALTERED MENTAL STATUS TYPE: ICD-10-CM

## 2024-04-30 DIAGNOSIS — J18.0 BRONCHOPNEUMONIA: ICD-10-CM

## 2024-04-30 DIAGNOSIS — B34.8 PARAINFLUENZA: ICD-10-CM

## 2024-04-30 DIAGNOSIS — A41.9 SEPSIS, DUE TO UNSPECIFIED ORGANISM, UNSPECIFIED WHETHER ACUTE ORGAN DYSFUNCTION PRESENT (HCC): Primary | ICD-10-CM

## 2024-04-30 DIAGNOSIS — Z86.73 HISTORY OF STROKE: ICD-10-CM

## 2024-04-30 LAB
ALBUMIN SERPL-MCNC: 4.2 G/DL (ref 3.5–5.2)
ALP SERPL-CCNC: 101 U/L (ref 40–129)
ALT SERPL-CCNC: 52 U/L (ref 0–40)
ANION GAP SERPL CALCULATED.3IONS-SCNC: 17 MMOL/L (ref 7–16)
AST SERPL-CCNC: 34 U/L (ref 0–39)
B PARAP IS1001 DNA NPH QL NAA+NON-PROBE: NOT DETECTED
B PERT DNA SPEC QL NAA+PROBE: NOT DETECTED
BACTERIA URNS QL MICRO: ABNORMAL
BASOPHILS # BLD: 0 K/UL (ref 0–0.2)
BASOPHILS NFR BLD: 0 % (ref 0–2)
BILIRUB SERPL-MCNC: 0.6 MG/DL (ref 0–1.2)
BILIRUB UR QL STRIP: NEGATIVE
BUN SERPL-MCNC: 20 MG/DL (ref 6–20)
C PNEUM DNA NPH QL NAA+NON-PROBE: NOT DETECTED
CALCIUM SERPL-MCNC: 9.3 MG/DL (ref 8.6–10.2)
CHLORIDE SERPL-SCNC: 98 MMOL/L (ref 98–107)
CLARITY UR: ABNORMAL
CO2 SERPL-SCNC: 22 MMOL/L (ref 22–29)
COLOR UR: YELLOW
CREAT SERPL-MCNC: 1.3 MG/DL (ref 0.7–1.2)
EOSINOPHIL # BLD: 0 K/UL (ref 0.05–0.5)
EOSINOPHILS RELATIVE PERCENT: 0 % (ref 0–6)
EPI CELLS #/AREA URNS HPF: ABNORMAL /HPF
ERYTHROCYTE [DISTWIDTH] IN BLOOD BY AUTOMATED COUNT: 12.9 % (ref 11.5–15)
FLUAV RNA NPH QL NAA+NON-PROBE: NOT DETECTED
FLUBV RNA NPH QL NAA+NON-PROBE: NOT DETECTED
GFR SERPL CREATININE-BSD FRML MDRD: 66 ML/MIN/1.73M2
GLUCOSE SERPL-MCNC: 262 MG/DL (ref 74–99)
GLUCOSE UR STRIP-MCNC: 500 MG/DL
HADV DNA NPH QL NAA+NON-PROBE: NOT DETECTED
HCOV 229E RNA NPH QL NAA+NON-PROBE: NOT DETECTED
HCOV HKU1 RNA NPH QL NAA+NON-PROBE: NOT DETECTED
HCOV NL63 RNA NPH QL NAA+NON-PROBE: NOT DETECTED
HCOV OC43 RNA NPH QL NAA+NON-PROBE: NOT DETECTED
HCT VFR BLD AUTO: 46.1 % (ref 37–54)
HGB BLD-MCNC: 15.7 G/DL (ref 12.5–16.5)
HGB UR QL STRIP.AUTO: ABNORMAL
HMPV RNA NPH QL NAA+NON-PROBE: NOT DETECTED
HPIV1 RNA NPH QL NAA+NON-PROBE: NOT DETECTED
HPIV3 RNA NPH QL NAA+NON-PROBE: DETECTED
HPIV4 RNA NPH QL NAA+NON-PROBE: NOT DETECTED
KETONES UR STRIP-MCNC: 15 MG/DL
LACTATE BLDV-SCNC: 2.3 MMOL/L (ref 0.5–1.9)
LEUKOCYTE ESTERASE UR QL STRIP: NEGATIVE
LIPASE SERPL-CCNC: 15 U/L (ref 13–60)
LYMPHOCYTES NFR BLD: 0.19 K/UL (ref 1.5–4)
LYMPHOCYTES RELATIVE PERCENT: 2 % (ref 20–42)
M PNEUMO DNA NPH QL NAA+NON-PROBE: NOT DETECTED
MCH RBC QN AUTO: 28.3 PG (ref 26–35)
MCHC RBC AUTO-ENTMCNC: 34.1 G/DL (ref 32–34.5)
MCV RBC AUTO: 83.2 FL (ref 80–99.9)
MONOCYTES NFR BLD: 1.58 K/UL (ref 0.1–0.95)
MONOCYTES NFR BLD: 15 % (ref 2–12)
NEUTROPHILS NFR BLD: 84 % (ref 43–80)
NEUTS SEG NFR BLD: 8.93 K/UL (ref 1.8–7.3)
NITRITE UR QL STRIP: NEGATIVE
PH UR STRIP: 6 [PH] (ref 5–9)
PLATELET # BLD AUTO: 130 K/UL (ref 130–450)
PMV BLD AUTO: 9.6 FL (ref 7–12)
POTASSIUM SERPL-SCNC: 4.4 MMOL/L (ref 3.5–5)
PROT SERPL-MCNC: 8.5 G/DL (ref 6.4–8.3)
PROT UR STRIP-MCNC: 30 MG/DL
RBC # BLD AUTO: 5.54 M/UL (ref 3.8–5.8)
RBC # BLD: ABNORMAL 10*6/UL
RBC #/AREA URNS HPF: ABNORMAL /HPF
RSV RNA NPH QL NAA+NON-PROBE: NOT DETECTED
RV+EV RNA NPH QL NAA+NON-PROBE: NOT DETECTED
SARS-COV-2 RNA NPH QL NAA+NON-PROBE: NOT DETECTED
SODIUM SERPL-SCNC: 137 MMOL/L (ref 132–146)
SP GR UR STRIP: 1.02 (ref 1–1.03)
SPECIMEN DESCRIPTION: ABNORMAL
TROPONIN I SERPL HS-MCNC: 61 NG/L (ref 0–11)
TROPONIN I SERPL HS-MCNC: 65 NG/L (ref 0–11)
UROBILINOGEN UR STRIP-ACNC: 4 EU/DL (ref 0–1)
WBC #/AREA URNS HPF: ABNORMAL /HPF
WBC OTHER # BLD: 10.7 K/UL (ref 4.5–11.5)

## 2024-04-30 PROCEDURE — 74177 CT ABD & PELVIS W/CONTRAST: CPT

## 2024-04-30 PROCEDURE — 96366 THER/PROPH/DIAG IV INF ADDON: CPT

## 2024-04-30 PROCEDURE — 84484 ASSAY OF TROPONIN QUANT: CPT

## 2024-04-30 PROCEDURE — 2580000003 HC RX 258

## 2024-04-30 PROCEDURE — 6360000002 HC RX W HCPCS: Performed by: EMERGENCY MEDICINE

## 2024-04-30 PROCEDURE — 96367 TX/PROPH/DG ADDL SEQ IV INF: CPT

## 2024-04-30 PROCEDURE — 96375 TX/PRO/DX INJ NEW DRUG ADDON: CPT

## 2024-04-30 PROCEDURE — 85025 COMPLETE CBC W/AUTO DIFF WBC: CPT

## 2024-04-30 PROCEDURE — 0202U NFCT DS 22 TRGT SARS-COV-2: CPT

## 2024-04-30 PROCEDURE — 2060000000 HC ICU INTERMEDIATE R&B

## 2024-04-30 PROCEDURE — 81001 URINALYSIS AUTO W/SCOPE: CPT

## 2024-04-30 PROCEDURE — 80053 COMPREHEN METABOLIC PANEL: CPT

## 2024-04-30 PROCEDURE — 71045 X-RAY EXAM CHEST 1 VIEW: CPT

## 2024-04-30 PROCEDURE — 87040 BLOOD CULTURE FOR BACTERIA: CPT

## 2024-04-30 PROCEDURE — 62270 DX LMBR SPI PNXR: CPT

## 2024-04-30 PROCEDURE — 84145 PROCALCITONIN (PCT): CPT

## 2024-04-30 PROCEDURE — 96365 THER/PROPH/DIAG IV INF INIT: CPT

## 2024-04-30 PROCEDURE — 71275 CT ANGIOGRAPHY CHEST: CPT

## 2024-04-30 PROCEDURE — 87086 URINE CULTURE/COLONY COUNT: CPT

## 2024-04-30 PROCEDURE — 6360000004 HC RX CONTRAST MEDICATION: Performed by: RADIOLOGY

## 2024-04-30 PROCEDURE — 93005 ELECTROCARDIOGRAM TRACING: CPT

## 2024-04-30 PROCEDURE — 70450 CT HEAD/BRAIN W/O DYE: CPT

## 2024-04-30 PROCEDURE — 6360000002 HC RX W HCPCS

## 2024-04-30 PROCEDURE — 83605 ASSAY OF LACTIC ACID: CPT

## 2024-04-30 PROCEDURE — 2580000003 HC RX 258: Performed by: EMERGENCY MEDICINE

## 2024-04-30 PROCEDURE — 6370000000 HC RX 637 (ALT 250 FOR IP)

## 2024-04-30 PROCEDURE — 99285 EMERGENCY DEPT VISIT HI MDM: CPT

## 2024-04-30 PROCEDURE — 83690 ASSAY OF LIPASE: CPT

## 2024-04-30 RX ORDER — KETOROLAC TROMETHAMINE 30 MG/ML
15 INJECTION, SOLUTION INTRAMUSCULAR; INTRAVENOUS ONCE
Status: COMPLETED | OUTPATIENT
Start: 2024-04-30 | End: 2024-04-30

## 2024-04-30 RX ORDER — ENOXAPARIN SODIUM 100 MG/ML
40 INJECTION SUBCUTANEOUS DAILY
Status: DISCONTINUED | OUTPATIENT
Start: 2024-05-01 | End: 2024-05-06 | Stop reason: HOSPADM

## 2024-04-30 RX ORDER — SODIUM CHLORIDE 0.9 % (FLUSH) 0.9 %
5-40 SYRINGE (ML) INJECTION PRN
Status: DISCONTINUED | OUTPATIENT
Start: 2024-04-30 | End: 2024-05-06 | Stop reason: HOSPADM

## 2024-04-30 RX ORDER — SODIUM CHLORIDE 9 MG/ML
INJECTION, SOLUTION INTRAVENOUS PRN
Status: DISCONTINUED | OUTPATIENT
Start: 2024-04-30 | End: 2024-04-30 | Stop reason: SDUPTHER

## 2024-04-30 RX ORDER — DEXAMETHASONE SODIUM PHOSPHATE 10 MG/ML
10 INJECTION INTRAMUSCULAR; INTRAVENOUS ONCE
Status: COMPLETED | OUTPATIENT
Start: 2024-04-30 | End: 2024-05-01

## 2024-04-30 RX ORDER — CALCIUM CARBONATE 500 MG/1
500 TABLET, CHEWABLE ORAL 3 TIMES DAILY PRN
Status: DISCONTINUED | OUTPATIENT
Start: 2024-04-30 | End: 2024-05-06 | Stop reason: HOSPADM

## 2024-04-30 RX ORDER — ACETAMINOPHEN 325 MG/1
650 TABLET ORAL EVERY 6 HOURS PRN
Status: DISCONTINUED | OUTPATIENT
Start: 2024-04-30 | End: 2024-05-06 | Stop reason: HOSPADM

## 2024-04-30 RX ORDER — SODIUM CHLORIDE 9 MG/ML
INJECTION, SOLUTION INTRAVENOUS PRN
Status: DISCONTINUED | OUTPATIENT
Start: 2024-04-30 | End: 2024-05-06 | Stop reason: HOSPADM

## 2024-04-30 RX ORDER — SODIUM CHLORIDE 0.9 % (FLUSH) 0.9 %
5-40 SYRINGE (ML) INJECTION EVERY 12 HOURS SCHEDULED
Status: DISCONTINUED | OUTPATIENT
Start: 2024-05-01 | End: 2024-05-06 | Stop reason: HOSPADM

## 2024-04-30 RX ORDER — ACETAMINOPHEN 650 MG/1
650 SUPPOSITORY RECTAL ONCE
Status: COMPLETED | OUTPATIENT
Start: 2024-04-30 | End: 2024-04-30

## 2024-04-30 RX ORDER — BENZONATATE 100 MG/1
100 CAPSULE ORAL 3 TIMES DAILY PRN
Status: DISCONTINUED | OUTPATIENT
Start: 2024-04-30 | End: 2024-05-06 | Stop reason: HOSPADM

## 2024-04-30 RX ORDER — SODIUM CHLORIDE 9 MG/ML
INJECTION, SOLUTION INTRAVENOUS CONTINUOUS
Status: DISCONTINUED | OUTPATIENT
Start: 2024-04-30 | End: 2024-05-01 | Stop reason: SDUPTHER

## 2024-04-30 RX ORDER — 0.9 % SODIUM CHLORIDE 0.9 %
30 INTRAVENOUS SOLUTION INTRAVENOUS ONCE
Status: COMPLETED | OUTPATIENT
Start: 2024-04-30 | End: 2024-04-30

## 2024-04-30 RX ORDER — SODIUM CHLORIDE 0.9 % (FLUSH) 0.9 %
5-40 SYRINGE (ML) INJECTION EVERY 12 HOURS SCHEDULED
Status: DISCONTINUED | OUTPATIENT
Start: 2024-04-30 | End: 2024-05-06 | Stop reason: HOSPADM

## 2024-04-30 RX ORDER — ONDANSETRON 2 MG/ML
4 INJECTION INTRAMUSCULAR; INTRAVENOUS EVERY 6 HOURS PRN
Status: DISCONTINUED | OUTPATIENT
Start: 2024-04-30 | End: 2024-05-06 | Stop reason: HOSPADM

## 2024-04-30 RX ORDER — LANOLIN ALCOHOL/MO/W.PET/CERES
3 CREAM (GRAM) TOPICAL NIGHTLY PRN
Status: DISCONTINUED | OUTPATIENT
Start: 2024-05-01 | End: 2024-05-06 | Stop reason: HOSPADM

## 2024-04-30 RX ORDER — ONDANSETRON 4 MG/1
4 TABLET, ORALLY DISINTEGRATING ORAL EVERY 8 HOURS PRN
Status: DISCONTINUED | OUTPATIENT
Start: 2024-04-30 | End: 2024-05-06 | Stop reason: HOSPADM

## 2024-04-30 RX ORDER — HYDRALAZINE HYDROCHLORIDE 20 MG/ML
10 INJECTION INTRAMUSCULAR; INTRAVENOUS EVERY 6 HOURS PRN
Status: DISCONTINUED | OUTPATIENT
Start: 2024-04-30 | End: 2024-05-06 | Stop reason: HOSPADM

## 2024-04-30 RX ORDER — POLYETHYLENE GLYCOL 3350 17 G/17G
17 POWDER, FOR SOLUTION ORAL DAILY PRN
Status: DISCONTINUED | OUTPATIENT
Start: 2024-04-30 | End: 2024-05-01 | Stop reason: SDUPTHER

## 2024-04-30 RX ORDER — POTASSIUM CHLORIDE 7.45 MG/ML
10 INJECTION INTRAVENOUS PRN
Status: DISCONTINUED | OUTPATIENT
Start: 2024-04-30 | End: 2024-05-06 | Stop reason: HOSPADM

## 2024-04-30 RX ORDER — MAGNESIUM SULFATE IN WATER 40 MG/ML
2000 INJECTION, SOLUTION INTRAVENOUS PRN
Status: DISCONTINUED | OUTPATIENT
Start: 2024-04-30 | End: 2024-05-06 | Stop reason: HOSPADM

## 2024-04-30 RX ORDER — POTASSIUM CHLORIDE 20 MEQ/1
40 TABLET, EXTENDED RELEASE ORAL PRN
Status: DISCONTINUED | OUTPATIENT
Start: 2024-04-30 | End: 2024-05-06 | Stop reason: HOSPADM

## 2024-04-30 RX ORDER — ACETAMINOPHEN 650 MG/1
650 SUPPOSITORY RECTAL EVERY 6 HOURS PRN
Status: DISCONTINUED | OUTPATIENT
Start: 2024-04-30 | End: 2024-05-06 | Stop reason: HOSPADM

## 2024-04-30 RX ADMIN — SODIUM CHLORIDE 2250 ML: 9 INJECTION, SOLUTION INTRAVENOUS at 17:17

## 2024-04-30 RX ADMIN — IOPAMIDOL 75 ML: 755 INJECTION, SOLUTION INTRAVENOUS at 21:21

## 2024-04-30 RX ADMIN — PIPERACILLIN AND TAZOBACTAM 4500 MG: 4; .5 INJECTION, POWDER, FOR SOLUTION INTRAVENOUS at 18:55

## 2024-04-30 RX ADMIN — Medication 1500 MG: at 19:46

## 2024-04-30 RX ADMIN — KETOROLAC TROMETHAMINE 15 MG: 30 INJECTION, SOLUTION INTRAMUSCULAR at 21:39

## 2024-04-30 RX ADMIN — ACETAMINOPHEN 650 MG: 650 SUPPOSITORY RECTAL at 20:29

## 2024-04-30 ASSESSMENT — LIFESTYLE VARIABLES: HOW OFTEN DO YOU HAVE A DRINK CONTAINING ALCOHOL: NEVER

## 2024-04-30 NOTE — ED PROVIDER NOTES
[facility] EMERGENCY DEPARTMENT  EMERGENCY DEPARTMENT ENCOUNTER        Pt Name: Isra Schwartz  MRN: [id number]  Birthdate 1970  Date of evaluation: 4/30/2024  Provider: Migel Doan DO  PCP: Kaylyn Alex MD  Note Started: 4:52 PM EDT 4/30/24    CHIEF COMPLAINT       Chief Complaint   Patient presents with    Fever     Facility reports > 103 this morning. Slight improvement w Tylenol at that time.  Progressively worsened as day went on.  EMS glucose 346.       HISTORY OF PRESENT ILLNESS: 1 or more Elements   History From: EMS    Limitations to history : Altered Mental Status    Isra Schwartz is a 54 y.o. male who presents to the emergency department for concern for sepsis with a fever of 103 degrees at the nursing home that started this morning.  Patient has right-sided hemiparesis and nonverbal from previous stroke.  EMS reports the patient had 103 degree fever and was hypoxic at the nursing home, was 86% on room air, placed on 4 L NC O2.  Limited HPI secondary to patient nonverbal.  Per paperwork from nursing, he is normally alert follows commands, reported he is altered from his baseline with lethargy and only opening his eyes to name.    Nursing Notes were all reviewed and agreed with or any disagreements were addressed in the HPI.        REVIEW OF SYSTEMS :         Limited ROS secondary to patient nonverbal    SURGICAL HISTORY     Past Surgical History:   Procedure Laterality Date    HIP FRACTURE SURGERY Right 5/20/2022    HIP OPEN REDUCTION INTERNAL FIXATION performed [place] F [place]inson [place] [facility] [place]       CURRENTMEDICATIONS       Previous Medications    ACETAMINOPHEN (TYLENOL) 325 MG TABLET    Take 2 tablets by mouth every 4 hours as needed for Pain or Fever    ALUMINUM & MAGNESIUM HYDROXIDE-SIMETHICONE (MYLANTA) 400-400-40 MG/5ML SUSP    Take 30 mLs by mouth every 6 hours as needed    AMLODIPINE (NORVASC) 10 MG TABLET    Take 1

## 2024-05-01 LAB
ALBUMIN SERPL-MCNC: 3.4 G/DL (ref 3.5–5.2)
ALP SERPL-CCNC: 70 U/L (ref 40–129)
ALT SERPL-CCNC: 31 U/L (ref 0–40)
ANION GAP SERPL CALCULATED.3IONS-SCNC: 13 MMOL/L (ref 7–16)
APPEARANCE CSF: ABNORMAL
AST SERPL-CCNC: 17 U/L (ref 0–39)
BILIRUB SERPL-MCNC: 0.5 MG/DL (ref 0–1.2)
BUN SERPL-MCNC: 19 MG/DL (ref 6–20)
C GATTII+NEOFOR DNA CSF QL NAA+NON-PROBE: NOT DETECTED
CALCIUM SERPL-MCNC: 8.5 MG/DL (ref 8.6–10.2)
CHLORIDE SERPL-SCNC: 107 MMOL/L (ref 98–107)
CHOLEST SERPL-MCNC: 98 MG/DL
CLOT CHECK: ABNORMAL
CMV DNA CSF QL NAA+NON-PROBE: NOT DETECTED
CO2 SERPL-SCNC: 20 MMOL/L (ref 22–29)
COLOR CSF: ABNORMAL
CREAT SERPL-MCNC: 1.1 MG/DL (ref 0.7–1.2)
E COLI K1 DNA CSF QL NAA+NON-PROBE: NOT DETECTED
ERYTHROCYTE [DISTWIDTH] IN BLOOD BY AUTOMATED COUNT: 12.7 % (ref 11.5–15)
EV RNA CSF QL NAA+NON-PROBE: NOT DETECTED
GFR, ESTIMATED: 81 ML/MIN/1.73M2
GLUCOSE BLD-MCNC: 241 MG/DL (ref 74–99)
GLUCOSE BLD-MCNC: 318 MG/DL (ref 74–99)
GLUCOSE BLD-MCNC: 341 MG/DL (ref 74–99)
GLUCOSE CSF-MCNC: 152 MG/DL (ref 40–70)
GLUCOSE SERPL-MCNC: 272 MG/DL (ref 74–99)
GP B STREP DNA CSF QL NAA+NON-PROBE: NOT DETECTED
HAEM INFLU DNA CSF QL NAA+NON-PROBE: NOT DETECTED
HBA1C MFR BLD: 8.9 % (ref 4–5.6)
HBA1C MFR BLD: 9.1 % (ref 4–5.6)
HCT VFR BLD AUTO: 41.1 % (ref 37–54)
HDLC SERPL-MCNC: 23 MG/DL
HGB BLD-MCNC: 13.8 G/DL (ref 12.5–16.5)
HHV6 DNA CSF QL NAA+NON-PROBE: NOT DETECTED
HSV1 DNA CSF QL NAA+NON-PROBE: NOT DETECTED
HSV2 DNA CSF QL NAA+NON-PROBE: NOT DETECTED
L MONOCYTOG DNA CSF QL NAA+NON-PROBE: NOT DETECTED
LDLC SERPL CALC-MCNC: 55 MG/DL
MAGNESIUM SERPL-MCNC: 1.7 MG/DL (ref 1.6–2.6)
MCH RBC QN AUTO: 28.4 PG (ref 26–35)
MCHC RBC AUTO-ENTMCNC: 33.6 G/DL (ref 32–34.5)
MCV RBC AUTO: 84.6 FL (ref 80–99.9)
MONOCYTES, CSF: 33 % (ref 15–45)
N MEN DNA CSF QL NAA+NON-PROBE: NOT DETECTED
NEUTROPHILS NFR CSF: 67 % (ref 0–6)
NUC CELL # FLD MANUAL: 24 CELLS/UL (ref 0–5)
PARECHOVIRUS A RNA CSF QL NAA+NON-PROBE: NOT DETECTED
PHOSPHATE SERPL-MCNC: 2.5 MG/DL (ref 2.5–4.5)
PLATELET # BLD AUTO: 115 K/UL (ref 130–450)
PMV BLD AUTO: 9.4 FL (ref 7–12)
POTASSIUM SERPL-SCNC: 4.1 MMOL/L (ref 3.5–5)
PROCALCITONIN SERPL-MCNC: 5 NG/ML (ref 0–0.08)
PROT CSF-MCNC: 90.4 MG/DL (ref 15–40)
PROT SERPL-MCNC: 6.9 G/DL (ref 6.4–8.3)
RBC # BLD AUTO: 4.86 M/UL (ref 3.8–5.8)
RBC # FLD MANUAL: 9000 CELLS/UL
S PNEUM DNA CSF QL NAA+NON-PROBE: NOT DETECTED
SODIUM SERPL-SCNC: 140 MMOL/L (ref 132–146)
SPECIMEN DESCRIPTION: NORMAL
SPECIMEN VOL CSF: ABNORMAL ML
TRIGL SERPL-MCNC: 99 MG/DL
TSH SERPL DL<=0.05 MIU/L-ACNC: 1.69 UIU/ML (ref 0.27–4.2)
TUBE # CSF: 1
VLDLC SERPL CALC-MCNC: 20 MG/DL
VZV DNA CSF QL NAA+NON-PROBE: NOT DETECTED
WBC OTHER # BLD: 10.7 K/UL (ref 4.5–11.5)

## 2024-05-01 PROCEDURE — 84157 ASSAY OF PROTEIN OTHER: CPT

## 2024-05-01 PROCEDURE — 87015 SPECIMEN INFECT AGNT CONCNTJ: CPT

## 2024-05-01 PROCEDURE — 6370000000 HC RX 637 (ALT 250 FOR IP): Performed by: INTERNAL MEDICINE

## 2024-05-01 PROCEDURE — 82962 GLUCOSE BLOOD TEST: CPT

## 2024-05-01 PROCEDURE — 2580000003 HC RX 258: Performed by: INTERNAL MEDICINE

## 2024-05-01 PROCEDURE — 83735 ASSAY OF MAGNESIUM: CPT

## 2024-05-01 PROCEDURE — 87483 CNS DNA AMP PROBE TYPE 12-25: CPT

## 2024-05-01 PROCEDURE — 80053 COMPREHEN METABOLIC PANEL: CPT

## 2024-05-01 PROCEDURE — 2580000003 HC RX 258

## 2024-05-01 PROCEDURE — 83036 HEMOGLOBIN GLYCOSYLATED A1C: CPT

## 2024-05-01 PROCEDURE — 6360000002 HC RX W HCPCS

## 2024-05-01 PROCEDURE — 36415 COLL VENOUS BLD VENIPUNCTURE: CPT

## 2024-05-01 PROCEDURE — 6360000002 HC RX W HCPCS: Performed by: INTERNAL MEDICINE

## 2024-05-01 PROCEDURE — 84100 ASSAY OF PHOSPHORUS: CPT

## 2024-05-01 PROCEDURE — 87389 HIV-1 AG W/HIV-1&-2 AB AG IA: CPT

## 2024-05-01 PROCEDURE — 87081 CULTURE SCREEN ONLY: CPT

## 2024-05-01 PROCEDURE — 87070 CULTURE OTHR SPECIMN AEROBIC: CPT

## 2024-05-01 PROCEDURE — 2060000000 HC ICU INTERMEDIATE R&B

## 2024-05-01 PROCEDURE — 89051 BODY FLUID CELL COUNT: CPT

## 2024-05-01 PROCEDURE — 87899 AGENT NOS ASSAY W/OPTIC: CPT

## 2024-05-01 PROCEDURE — 009U3ZX DRAINAGE OF SPINAL CANAL, PERCUTANEOUS APPROACH, DIAGNOSTIC: ICD-10-PCS | Performed by: EMERGENCY MEDICINE

## 2024-05-01 PROCEDURE — 82945 GLUCOSE OTHER FLUID: CPT

## 2024-05-01 PROCEDURE — 6370000000 HC RX 637 (ALT 250 FOR IP): Performed by: FAMILY MEDICINE

## 2024-05-01 PROCEDURE — 99232 SBSQ HOSP IP/OBS MODERATE 35: CPT | Performed by: FAMILY MEDICINE

## 2024-05-01 PROCEDURE — 80061 LIPID PANEL: CPT

## 2024-05-01 PROCEDURE — 87449 NOS EACH ORGANISM AG IA: CPT

## 2024-05-01 PROCEDURE — 87205 SMEAR GRAM STAIN: CPT

## 2024-05-01 PROCEDURE — 84443 ASSAY THYROID STIM HORMONE: CPT

## 2024-05-01 PROCEDURE — 85027 COMPLETE CBC AUTOMATED: CPT

## 2024-05-01 RX ORDER — INSULIN GLARGINE 100 [IU]/ML
6 INJECTION, SOLUTION SUBCUTANEOUS 2 TIMES DAILY
Status: DISCONTINUED | OUTPATIENT
Start: 2024-05-01 | End: 2024-05-05

## 2024-05-01 RX ORDER — ESCITALOPRAM OXALATE 10 MG/1
20 TABLET ORAL DAILY
Status: DISCONTINUED | OUTPATIENT
Start: 2024-05-01 | End: 2024-05-06 | Stop reason: HOSPADM

## 2024-05-01 RX ORDER — DEXTROSE MONOHYDRATE 100 MG/ML
INJECTION, SOLUTION INTRAVENOUS CONTINUOUS PRN
Status: DISCONTINUED | OUTPATIENT
Start: 2024-05-01 | End: 2024-05-06 | Stop reason: HOSPADM

## 2024-05-01 RX ORDER — BISACODYL 10 MG
10 SUPPOSITORY, RECTAL RECTAL DAILY
Status: DISCONTINUED | OUTPATIENT
Start: 2024-05-01 | End: 2024-05-06 | Stop reason: HOSPADM

## 2024-05-01 RX ORDER — AMLODIPINE BESYLATE 10 MG/1
10 TABLET ORAL DAILY
Status: DISCONTINUED | OUTPATIENT
Start: 2024-05-01 | End: 2024-05-06 | Stop reason: HOSPADM

## 2024-05-01 RX ORDER — SODIUM CHLORIDE 9 MG/ML
INJECTION, SOLUTION INTRAVENOUS CONTINUOUS
Status: DISCONTINUED | OUTPATIENT
Start: 2024-05-01 | End: 2024-05-03

## 2024-05-01 RX ORDER — ASCORBIC ACID 500 MG
500 TABLET ORAL DAILY
Status: DISCONTINUED | OUTPATIENT
Start: 2024-05-01 | End: 2024-05-06 | Stop reason: HOSPADM

## 2024-05-01 RX ORDER — POLYETHYLENE GLYCOL 3350 17 G/17G
17 POWDER, FOR SOLUTION ORAL DAILY
Status: DISCONTINUED | OUTPATIENT
Start: 2024-05-01 | End: 2024-05-01 | Stop reason: SDUPTHER

## 2024-05-01 RX ORDER — GLUCAGON 1 MG/ML
1 KIT INJECTION PRN
Status: DISCONTINUED | OUTPATIENT
Start: 2024-05-01 | End: 2024-05-06 | Stop reason: HOSPADM

## 2024-05-01 RX ORDER — POLYETHYLENE GLYCOL 3350 17 G/17G
17 POWDER, FOR SOLUTION ORAL DAILY
Status: DISCONTINUED | OUTPATIENT
Start: 2024-05-01 | End: 2024-05-06 | Stop reason: HOSPADM

## 2024-05-01 RX ORDER — CARVEDILOL 25 MG/1
25 TABLET ORAL 2 TIMES DAILY WITH MEALS
Status: DISCONTINUED | OUTPATIENT
Start: 2024-05-01 | End: 2024-05-06 | Stop reason: HOSPADM

## 2024-05-01 RX ORDER — INSULIN LISPRO 100 [IU]/ML
0-8 INJECTION, SOLUTION INTRAVENOUS; SUBCUTANEOUS EVERY 4 HOURS
Status: DISCONTINUED | OUTPATIENT
Start: 2024-05-01 | End: 2024-05-04

## 2024-05-01 RX ORDER — ATORVASTATIN CALCIUM 40 MG/1
40 TABLET, FILM COATED ORAL NIGHTLY
Status: DISCONTINUED | OUTPATIENT
Start: 2024-05-01 | End: 2024-05-06 | Stop reason: HOSPADM

## 2024-05-01 RX ADMIN — INSULIN GLARGINE 6 UNITS: 100 INJECTION, SOLUTION SUBCUTANEOUS at 20:27

## 2024-05-01 RX ADMIN — SODIUM CHLORIDE: 9 INJECTION, SOLUTION INTRAVENOUS at 00:22

## 2024-05-01 RX ADMIN — INSULIN LISPRO 6 UNITS: 100 INJECTION, SOLUTION INTRAVENOUS; SUBCUTANEOUS at 16:42

## 2024-05-01 RX ADMIN — AMPICILLIN SODIUM 2000 MG: 2 INJECTION, POWDER, FOR SOLUTION INTRAMUSCULAR; INTRAVENOUS at 10:21

## 2024-05-01 RX ADMIN — WATER 2000 MG: 1 INJECTION INTRAMUSCULAR; INTRAVENOUS; SUBCUTANEOUS at 00:27

## 2024-05-01 RX ADMIN — AMLODIPINE BESYLATE 10 MG: 10 TABLET ORAL at 10:23

## 2024-05-01 RX ADMIN — ENOXAPARIN SODIUM 40 MG: 100 INJECTION SUBCUTANEOUS at 10:25

## 2024-05-01 RX ADMIN — INSULIN GLARGINE 6 UNITS: 100 INJECTION, SOLUTION SUBCUTANEOUS at 10:26

## 2024-05-01 RX ADMIN — VANCOMYCIN HYDROCHLORIDE 1750 MG: 10 INJECTION, POWDER, LYOPHILIZED, FOR SOLUTION INTRAVENOUS at 11:51

## 2024-05-01 RX ADMIN — SODIUM CHLORIDE, PRESERVATIVE FREE 10 ML: 5 INJECTION INTRAVENOUS at 03:00

## 2024-05-01 RX ADMIN — BISACODYL 10 MG: 10 SUPPOSITORY RECTAL at 10:28

## 2024-05-01 RX ADMIN — SODIUM CHLORIDE, PRESERVATIVE FREE 10 ML: 5 INJECTION INTRAVENOUS at 20:27

## 2024-05-01 RX ADMIN — AMPICILLIN SODIUM 2000 MG: 2 INJECTION, POWDER, FOR SOLUTION INTRAMUSCULAR; INTRAVENOUS at 02:58

## 2024-05-01 RX ADMIN — DEXAMETHASONE SODIUM PHOSPHATE 10 MG: 10 INJECTION INTRAMUSCULAR; INTRAVENOUS at 00:13

## 2024-05-01 RX ADMIN — ATORVASTATIN CALCIUM 40 MG: 40 TABLET, FILM COATED ORAL at 20:27

## 2024-05-01 RX ADMIN — POLYETHYLENE GLYCOL 3350 17 G: 17 POWDER, FOR SOLUTION ORAL at 10:28

## 2024-05-01 RX ADMIN — SODIUM CHLORIDE, PRESERVATIVE FREE 10 ML: 5 INJECTION INTRAVENOUS at 10:28

## 2024-05-01 RX ADMIN — Medication 500 MG: at 10:23

## 2024-05-01 RX ADMIN — WATER 2000 MG: 1 INJECTION INTRAMUSCULAR; INTRAVENOUS; SUBCUTANEOUS at 13:14

## 2024-05-01 RX ADMIN — SODIUM CHLORIDE: 9 INJECTION, SOLUTION INTRAVENOUS at 02:52

## 2024-05-01 RX ADMIN — SODIUM CHLORIDE, PRESERVATIVE FREE 10 ML: 5 INJECTION INTRAVENOUS at 03:01

## 2024-05-01 RX ADMIN — ACYCLOVIR SODIUM 800 MG: 50 INJECTION, SOLUTION INTRAVENOUS at 00:43

## 2024-05-01 RX ADMIN — CARVEDILOL 25 MG: 25 TABLET, FILM COATED ORAL at 11:07

## 2024-05-01 RX ADMIN — CARVEDILOL 25 MG: 25 TABLET, FILM COATED ORAL at 16:42

## 2024-05-01 RX ADMIN — INSULIN LISPRO 4 UNITS: 100 INJECTION, SOLUTION INTRAVENOUS; SUBCUTANEOUS at 10:26

## 2024-05-01 RX ADMIN — ESCITALOPRAM OXALATE 20 MG: 10 TABLET ORAL at 10:23

## 2024-05-01 RX ADMIN — INSULIN LISPRO 6 UNITS: 100 INJECTION, SOLUTION INTRAVENOUS; SUBCUTANEOUS at 20:28

## 2024-05-01 NOTE — ED NOTES
PROCEDURE NOTE  4/30/24       Time: 1140 pm    LUMBAR PUNCTURE  Risks, benefits and alternatives (for applicable procedures below) described.   Performed By: Migel Doan DO and Dr. Rupinder Ortega MD.    Indication: Suspected meningitis and to obtain spinal fluid for diagnostic testing.   Informed consent: Verbal consent obtained.  The legal guardian was counseled regarding the procedure via phone (confirmed by third party present), it's indications, risks, potential complications and alternatives and any questions were answered. Verbal consent was obtained.  Local Anesthesia:  obtained with Lidocaine 1% without epinephrine.  Procedure:  After left lateral decubitus positioning and sterile preparation a 20g spinal needle was inserted in the L4 interspace.    Lumbar puncture was successful.  Opening pressure in mm/H20 was not examined.  Approximately 4 ml of clear cerebral spinal fluid was removed.  Number of Attempts: 1  Patient tolerated the procedure well.  Complications:  None.          Migel Doan DO  PGY-2    ATTENDING PROVIDER ATTESTATION:     I have personally performed and/or participated in the history, exam, medical decision making, and procedures and agree with all pertinent clinical information.      I have also reviewed and agree with the past medical, family and social history unless otherwise noted.    I have discussed this patient in detail with the resident, and provided the instruction and education regarding the procedure note for LP patient had fever.    My findings/Plan: Patient was seen earlier by prior ED physician I became involved with the patient due to altered mental status as well as fever.  Patient was positive for influenza but still had high fever and was altered.  Patient had lumbar puncture done under my supervision by resident we did speak to family and they agreed with procedure.  Patient will be admitted to Roger Saeed MD  05/01/24 0032

## 2024-05-01 NOTE — H&P
Inpatient H&P      PCP:  Kaylyn Alex MD  Admitting Physician:  Salma Berg DO  Consultants:  ID  Chief Complaint:    Chief Complaint   Patient presents with    Fever     Facility reports > 103 this morning. Slight improvement w Tylenol at that time.  Progressively worsened as day went on.  EMS glucose 346.       History of Present Illness  Isra Schwartz is a 54 y.o. male who presents to [facility] ER complaining of fever.    Isra Schwartz has a past medical history that includes history of CVA, history of seizures, hypertension, diabetes, hyperlipidemia    Isra presents to the ER with fever.  He was found to have a fever of 103 at the nursing home this morning.  He does have chronic right-sided hemiparesis and nonverbal from previous stroke.  He was found to be 86% on room air at the nursing home and he was placed on 4 L nasal cannula.  History is limited due to patient being nonverbal.  Per paperwork from nursing home, he is normally alert and follows commands.  He is reportedly altered from his baseline with lethargy and only opening his eyes to name at this time.  He was found to be parainfluenza positive in the ER.  He is persistently febrile at 103.  He also underwent LP in the ER due to altered mental status and persistently elevated temperature.  Results are pending.  He was given    Discussed patient's case with ED physician.    ER Course  Upon presentation to the ER, routine labwork was performed which revealed creatinine 1.3 lactic acid 2.3, glucose 262, troponin 65, 61.  Imaging results are as outlined below in the Imaging section of this note.    Upon arrival to the ER, patient was 180/167, 88% on room air, tachycardic at 110, febrile at 103.1.  The patient received IVF, Tylenol, acyclovir, Rocephin, Decadron, Toradol, Zosyn, vancomycin in the emergency room and was admitted to [facility].    Last Hospital Admission -I personally reviewed admission from

## 2024-05-01 NOTE — CONSULTS
Session ID: [id number]  Language: Belarusian   ID: #[id number]   Name: Ramu
antigens, MRSA nares culture, HIV screen.  Continue supportive care.    Thank you for involving me in the care of Isra Kilgoreazevi Mccollumtiz. ID will continue to follow. Please do not hesitate to call for any questions or concerns.    Electronically signed by Carissa Adrian MD on 5/1/2024 at 1:07 PM

## 2024-05-02 LAB
ANION GAP SERPL CALCULATED.3IONS-SCNC: 14 MMOL/L (ref 7–16)
BASOPHILS # BLD: 0.01 K/UL (ref 0–0.2)
BASOPHILS NFR BLD: 0 % (ref 0–2)
BUN SERPL-MCNC: 18 MG/DL (ref 6–20)
CALCIUM SERPL-MCNC: 8.5 MG/DL (ref 8.6–10.2)
CHLORIDE SERPL-SCNC: 108 MMOL/L (ref 98–107)
CO2 SERPL-SCNC: 20 MMOL/L (ref 22–29)
CREAT SERPL-MCNC: 0.9 MG/DL (ref 0.7–1.2)
EKG ATRIAL RATE: 111 BPM
EKG P AXIS: 62 DEGREES
EKG P-R INTERVAL: 170 MS
EKG Q-T INTERVAL: 340 MS
EKG QRS DURATION: 80 MS
EKG QTC CALCULATION (BAZETT): 462 MS
EKG R AXIS: -36 DEGREES
EKG T AXIS: 87 DEGREES
EKG VENTRICULAR RATE: 111 BPM
EOSINOPHIL # BLD: 0 K/UL (ref 0.05–0.5)
EOSINOPHILS RELATIVE PERCENT: 0 % (ref 0–6)
ERYTHROCYTE [DISTWIDTH] IN BLOOD BY AUTOMATED COUNT: 12.8 % (ref 11.5–15)
GFR, ESTIMATED: >90 ML/MIN/1.73M2
GLUCOSE BLD-MCNC: 189 MG/DL (ref 74–99)
GLUCOSE BLD-MCNC: 204 MG/DL (ref 74–99)
GLUCOSE BLD-MCNC: 262 MG/DL (ref 74–99)
GLUCOSE BLD-MCNC: 262 MG/DL (ref 74–99)
GLUCOSE BLD-MCNC: 299 MG/DL (ref 74–99)
GLUCOSE SERPL-MCNC: 217 MG/DL (ref 74–99)
HCT VFR BLD AUTO: 37 % (ref 37–54)
HGB BLD-MCNC: 12.5 G/DL (ref 12.5–16.5)
HIV 1+2 AB+HIV1 P24 AG SERPL QL IA: NONREACTIVE
IMM GRANULOCYTES # BLD AUTO: <0.03 K/UL (ref 0–0.58)
IMM GRANULOCYTES NFR BLD: 0 % (ref 0–5)
L PNEUMO1 AG UR QL IA.RAPID: NEGATIVE
LYMPHOCYTES NFR BLD: 1.01 K/UL (ref 1.5–4)
LYMPHOCYTES RELATIVE PERCENT: 15 % (ref 20–42)
MCH RBC QN AUTO: 28.3 PG (ref 26–35)
MCHC RBC AUTO-ENTMCNC: 33.8 G/DL (ref 32–34.5)
MCV RBC AUTO: 83.7 FL (ref 80–99.9)
MONOCYTES NFR BLD: 0.66 K/UL (ref 0.1–0.95)
MONOCYTES NFR BLD: 10 % (ref 2–12)
NEUTROPHILS NFR BLD: 75 % (ref 43–80)
NEUTS SEG NFR BLD: 5.01 K/UL (ref 1.8–7.3)
PLATELET, FLUORESCENCE: 125 K/UL (ref 130–450)
PMV BLD AUTO: 9.9 FL (ref 7–12)
POTASSIUM SERPL-SCNC: 3.7 MMOL/L (ref 3.5–5)
RBC # BLD AUTO: 4.42 M/UL (ref 3.8–5.8)
S PNEUM AG SPEC QL: NEGATIVE
SODIUM SERPL-SCNC: 142 MMOL/L (ref 132–146)
SPECIMEN SOURCE: NORMAL
VANCOMYCIN SERPL-MCNC: 7.3 UG/ML (ref 5–40)
WBC OTHER # BLD: 6.7 K/UL (ref 4.5–11.5)

## 2024-05-02 PROCEDURE — 6370000000 HC RX 637 (ALT 250 FOR IP): Performed by: FAMILY MEDICINE

## 2024-05-02 PROCEDURE — 1200000000 HC SEMI PRIVATE

## 2024-05-02 PROCEDURE — 2580000003 HC RX 258

## 2024-05-02 PROCEDURE — 80202 ASSAY OF VANCOMYCIN: CPT

## 2024-05-02 PROCEDURE — 94640 AIRWAY INHALATION TREATMENT: CPT

## 2024-05-02 PROCEDURE — 80048 BASIC METABOLIC PNL TOTAL CA: CPT

## 2024-05-02 PROCEDURE — 36415 COLL VENOUS BLD VENIPUNCTURE: CPT

## 2024-05-02 PROCEDURE — 82962 GLUCOSE BLOOD TEST: CPT

## 2024-05-02 PROCEDURE — 85025 COMPLETE CBC W/AUTO DIFF WBC: CPT

## 2024-05-02 PROCEDURE — 2580000003 HC RX 258: Performed by: INTERNAL MEDICINE

## 2024-05-02 PROCEDURE — 6370000000 HC RX 637 (ALT 250 FOR IP)

## 2024-05-02 PROCEDURE — 6360000002 HC RX W HCPCS: Performed by: INTERNAL MEDICINE

## 2024-05-02 PROCEDURE — 6370000000 HC RX 637 (ALT 250 FOR IP): Performed by: INTERNAL MEDICINE

## 2024-05-02 PROCEDURE — 6360000002 HC RX W HCPCS

## 2024-05-02 PROCEDURE — 93010 ELECTROCARDIOGRAM REPORT: CPT | Performed by: INTERNAL MEDICINE

## 2024-05-02 PROCEDURE — 99232 SBSQ HOSP IP/OBS MODERATE 35: CPT | Performed by: INTERNAL MEDICINE

## 2024-05-02 PROCEDURE — 2700000000 HC OXYGEN THERAPY PER DAY

## 2024-05-02 RX ORDER — IPRATROPIUM BROMIDE AND ALBUTEROL SULFATE 2.5; .5 MG/3ML; MG/3ML
1 SOLUTION RESPIRATORY (INHALATION)
Status: DISCONTINUED | OUTPATIENT
Start: 2024-05-03 | End: 2024-05-03

## 2024-05-02 RX ADMIN — AMLODIPINE BESYLATE 10 MG: 10 TABLET ORAL at 08:22

## 2024-05-02 RX ADMIN — INSULIN LISPRO 4 UNITS: 100 INJECTION, SOLUTION INTRAVENOUS; SUBCUTANEOUS at 12:02

## 2024-05-02 RX ADMIN — Medication 500 MG: at 08:24

## 2024-05-02 RX ADMIN — SODIUM CHLORIDE: 9 INJECTION, SOLUTION INTRAVENOUS at 08:33

## 2024-05-02 RX ADMIN — ATORVASTATIN CALCIUM 40 MG: 40 TABLET, FILM COATED ORAL at 21:03

## 2024-05-02 RX ADMIN — CARVEDILOL 25 MG: 25 TABLET, FILM COATED ORAL at 17:18

## 2024-05-02 RX ADMIN — ENOXAPARIN SODIUM 40 MG: 100 INJECTION SUBCUTANEOUS at 08:26

## 2024-05-02 RX ADMIN — SODIUM CHLORIDE, PRESERVATIVE FREE 10 ML: 5 INJECTION INTRAVENOUS at 08:16

## 2024-05-02 RX ADMIN — SODIUM CHLORIDE: 9 INJECTION, SOLUTION INTRAVENOUS at 03:12

## 2024-05-02 RX ADMIN — INSULIN LISPRO 4 UNITS: 100 INJECTION, SOLUTION INTRAVENOUS; SUBCUTANEOUS at 21:03

## 2024-05-02 RX ADMIN — WATER 2000 MG: 1 INJECTION INTRAMUSCULAR; INTRAVENOUS; SUBCUTANEOUS at 01:18

## 2024-05-02 RX ADMIN — SODIUM CHLORIDE, PRESERVATIVE FREE 10 ML: 5 INJECTION INTRAVENOUS at 08:40

## 2024-05-02 RX ADMIN — SODIUM CHLORIDE, PRESERVATIVE FREE 10 ML: 5 INJECTION INTRAVENOUS at 21:04

## 2024-05-02 RX ADMIN — IPRATROPIUM BROMIDE AND ALBUTEROL SULFATE 1 DOSE: 2.5; .5 SOLUTION RESPIRATORY (INHALATION) at 21:05

## 2024-05-02 RX ADMIN — WATER 2000 MG: 1 INJECTION INTRAMUSCULAR; INTRAVENOUS; SUBCUTANEOUS at 23:30

## 2024-05-02 RX ADMIN — VANCOMYCIN HYDROCHLORIDE 1500 MG: 10 INJECTION, POWDER, LYOPHILIZED, FOR SOLUTION INTRAVENOUS at 08:38

## 2024-05-02 RX ADMIN — INSULIN LISPRO 4 UNITS: 100 INJECTION, SOLUTION INTRAVENOUS; SUBCUTANEOUS at 17:16

## 2024-05-02 RX ADMIN — INSULIN LISPRO 3 UNITS: 100 INJECTION, SOLUTION INTRAVENOUS; SUBCUTANEOUS at 01:28

## 2024-05-02 RX ADMIN — POLYETHYLENE GLYCOL 3350 17 G: 17 POWDER, FOR SOLUTION ORAL at 08:15

## 2024-05-02 RX ADMIN — CARVEDILOL 25 MG: 25 TABLET, FILM COATED ORAL at 08:22

## 2024-05-02 RX ADMIN — INSULIN GLARGINE 6 UNITS: 100 INJECTION, SOLUTION SUBCUTANEOUS at 08:25

## 2024-05-02 RX ADMIN — INSULIN GLARGINE 6 UNITS: 100 INJECTION, SOLUTION SUBCUTANEOUS at 21:03

## 2024-05-02 RX ADMIN — ESCITALOPRAM OXALATE 20 MG: 10 TABLET ORAL at 08:23

## 2024-05-02 NOTE — PLAN OF CARE
Problem: Discharge Planning  Goal: Discharge to home or other facility with appropriate resources  Outcome: Progressing     Problem: Skin/Tissue Integrity  Goal: Absence of new skin breakdown  Description: 1.  Monitor for areas of redness and/or skin breakdown  2.  Assess vascular access sites hourly  3.  Every 4-6 hours minimum:  Change oxygen saturation probe site  4.  Every 4-6 hours:  If on nasal continuous positive airway pressure, respiratory therapy assess nares and determine need for appliance change or resting period.  Outcome: Progressing     Problem: Discharge Planning  Goal: Discharge to home or other facility with appropriate resources  Outcome: Progressing     Problem: Skin/Tissue Integrity  Goal: Absence of new skin breakdown  Description: 1.  Monitor for areas of redness and/or skin breakdown  2.  Assess vascular access sites hourly  3.  Every 4-6 hours minimum:  Change oxygen saturation probe site  4.  Every 4-6 hours:  If on nasal continuous positive airway pressure, respiratory therapy assess nares and determine need for appliance change or resting period.  Outcome: Progressing

## 2024-05-02 NOTE — PLAN OF CARE
Problem: Discharge Planning  Goal: Discharge to home or other facility with appropriate resources  5/2/2024 0859 by Nannette Hopkins, RN  Outcome: Progressing  5/1/2024 2232 by Delma Gay, RN  Outcome: Progressing     Problem: Skin/Tissue Integrity  Goal: Absence of new skin breakdown  Description: 1.  Monitor for areas of redness and/or skin breakdown  2.  Assess vascular access sites hourly  3.  Every 4-6 hours minimum:  Change oxygen saturation probe site  4.  Every 4-6 hours:  If on nasal continuous positive airway pressure, respiratory therapy assess nares and determine need for appliance change or resting period.  5/2/2024 0859 by Nannette Hopkins, RN  Outcome: Progressing  5/1/2024 2232 by Delma Gay, RN  Outcome: Progressing     Problem: Safety - Adult  Goal: Free from fall injury  Outcome: Progressing     Problem: Pain  Goal: Verbalizes/displays adequate comfort level or baseline comfort level  Outcome: Progressing

## 2024-05-02 NOTE — ACP (ADVANCE CARE PLANNING)
Advance Care Planning   Healthcare Decision Maker:    Primary Decision Maker: Margareth Davison - Brother/Sister - [phone]    Click here to complete Healthcare Decision Makers including selection of the Healthcare Decision Maker Relationship (ie \"Primary\").

## 2024-05-02 NOTE — CARE COORDINATION
5/2/24 CM Note Pt admitted 4/30/24 Dx Parainfluenza.  IVF 100cc/hr, IV Rocephin. Final cultures pending. Pt is long term bed hold at [facility] .  Plans to return upon discharge.  Sister Margareth in agreement with DC plan.  Destination/[facility] updated.  Ambulance form and envelope placed on chart.   Marianela NUÑEZ RN-BC  [phone]    Case Management Assessment  Initial Evaluation    Date/Time of Evaluation: 5/2/2024 11:20 AM  Assessment Completed by: Marianela Oglesby RN    If patient is discharged prior to next notation, then this note serves as note for discharge by case management.    Patient Name: Isra Schwartz                   YOB: 1970  Diagnosis: Parainfluenza [B34.8]  History of stroke [Z86.73]  Altered mental status, unspecified altered mental status type [R41.82]  Sepsis, due to unspecified organism, unspecified whether acute organ dysfunction present (HCC) [A41.9]                   Date / Time: 4/30/2024  4:45 PM    Patient Admission Status: Inpatient   Readmission Risk (Low < 19, Mod (19-27), High > 27): Readmission Risk Score: 13.3    Current PCP: Kaylyn Alex MD  PCP verified by ? Yes    Chart Reviewed: Yes      History Provided by: Medical Record, Child/Family  Patient Orientation: Other (see comment) (aphasia)    Patient Cognition: Alert    Hospitalization in the last 30 days (Readmission):  No    If yes, Readmission Assessment in  Navigator will be completed.    Advance Directives:      Code Status: Full Code   Patient's Primary Decision Maker is: Legal Next of Kin    Primary Decision Maker: Margareth Davison - Brother/Sister - [phone]    Discharge Planning:    Patient lives with:   Type of Home:    Primary Care Giver: Other (Comment) ([facility])  Patient Support Systems include: Family Members        ADLS  Prior functional level: Assistance with the following:, Bathing, Dressing, Toileting, Feeding, Cooking, Housework, Shopping, Mobility  Current functional

## 2024-05-03 ENCOUNTER — APPOINTMENT (OUTPATIENT)
Dept: GENERAL RADIOLOGY | Age: 54
DRG: 720 | End: 2024-05-03
Payer: MEDICAID

## 2024-05-03 LAB
GLUCOSE BLD-MCNC: 155 MG/DL (ref 74–99)
GLUCOSE BLD-MCNC: 181 MG/DL (ref 74–99)
GLUCOSE BLD-MCNC: 219 MG/DL (ref 74–99)
GLUCOSE BLD-MCNC: 235 MG/DL (ref 74–99)
GLUCOSE BLD-MCNC: 343 MG/DL (ref 74–99)
MICROORGANISM SPEC CULT: ABNORMAL
MICROORGANISM SPEC CULT: ABNORMAL
MICROORGANISM SPEC CULT: NORMAL
PROCALCITONIN SERPL-MCNC: 2.72 NG/ML (ref 0–0.08)
SPECIMEN DESCRIPTION: ABNORMAL
SPECIMEN DESCRIPTION: NORMAL

## 2024-05-03 PROCEDURE — 71045 X-RAY EXAM CHEST 1 VIEW: CPT

## 2024-05-03 PROCEDURE — 6370000000 HC RX 637 (ALT 250 FOR IP): Performed by: INTERNAL MEDICINE

## 2024-05-03 PROCEDURE — 1200000000 HC SEMI PRIVATE

## 2024-05-03 PROCEDURE — 6370000000 HC RX 637 (ALT 250 FOR IP): Performed by: FAMILY MEDICINE

## 2024-05-03 PROCEDURE — 2580000003 HC RX 258

## 2024-05-03 PROCEDURE — 82962 GLUCOSE BLOOD TEST: CPT

## 2024-05-03 PROCEDURE — 6360000002 HC RX W HCPCS: Performed by: INTERNAL MEDICINE

## 2024-05-03 PROCEDURE — 36415 COLL VENOUS BLD VENIPUNCTURE: CPT

## 2024-05-03 PROCEDURE — 84145 PROCALCITONIN (PCT): CPT

## 2024-05-03 PROCEDURE — 99232 SBSQ HOSP IP/OBS MODERATE 35: CPT | Performed by: INTERNAL MEDICINE

## 2024-05-03 PROCEDURE — 2580000003 HC RX 258: Performed by: INTERNAL MEDICINE

## 2024-05-03 PROCEDURE — 2700000000 HC OXYGEN THERAPY PER DAY

## 2024-05-03 RX ORDER — IPRATROPIUM BROMIDE AND ALBUTEROL SULFATE 2.5; .5 MG/3ML; MG/3ML
1 SOLUTION RESPIRATORY (INHALATION) EVERY 8 HOURS
Status: DISCONTINUED | OUTPATIENT
Start: 2024-05-03 | End: 2024-05-03

## 2024-05-03 RX ORDER — IPRATROPIUM BROMIDE AND ALBUTEROL SULFATE 2.5; .5 MG/3ML; MG/3ML
1 SOLUTION RESPIRATORY (INHALATION) EVERY 4 HOURS PRN
Status: DISCONTINUED | OUTPATIENT
Start: 2024-05-03 | End: 2024-05-06 | Stop reason: HOSPADM

## 2024-05-03 RX ORDER — IPRATROPIUM BROMIDE AND ALBUTEROL SULFATE 2.5; .5 MG/3ML; MG/3ML
1 SOLUTION RESPIRATORY (INHALATION) 4 TIMES DAILY PRN
Status: DISCONTINUED | OUTPATIENT
Start: 2024-05-03 | End: 2024-05-03

## 2024-05-03 RX ADMIN — SODIUM CHLORIDE, PRESERVATIVE FREE 10 ML: 5 INJECTION INTRAVENOUS at 09:39

## 2024-05-03 RX ADMIN — ENOXAPARIN SODIUM 40 MG: 100 INJECTION SUBCUTANEOUS at 09:37

## 2024-05-03 RX ADMIN — INSULIN GLARGINE 6 UNITS: 100 INJECTION, SOLUTION SUBCUTANEOUS at 09:38

## 2024-05-03 RX ADMIN — INSULIN LISPRO 2 UNITS: 100 INJECTION, SOLUTION INTRAVENOUS; SUBCUTANEOUS at 12:26

## 2024-05-03 RX ADMIN — SODIUM CHLORIDE, PRESERVATIVE FREE 10 ML: 5 INJECTION INTRAVENOUS at 20:05

## 2024-05-03 RX ADMIN — AMLODIPINE BESYLATE 10 MG: 10 TABLET ORAL at 09:38

## 2024-05-03 RX ADMIN — INSULIN LISPRO 6 UNITS: 100 INJECTION, SOLUTION INTRAVENOUS; SUBCUTANEOUS at 20:05

## 2024-05-03 RX ADMIN — CARVEDILOL 25 MG: 25 TABLET, FILM COATED ORAL at 17:34

## 2024-05-03 RX ADMIN — SODIUM CHLORIDE: 9 INJECTION, SOLUTION INTRAVENOUS at 07:10

## 2024-05-03 RX ADMIN — POLYETHYLENE GLYCOL 3350 17 G: 17 POWDER, FOR SOLUTION ORAL at 09:37

## 2024-05-03 RX ADMIN — ESCITALOPRAM OXALATE 20 MG: 10 TABLET ORAL at 09:38

## 2024-05-03 RX ADMIN — INSULIN LISPRO 2 UNITS: 100 INJECTION, SOLUTION INTRAVENOUS; SUBCUTANEOUS at 17:34

## 2024-05-03 RX ADMIN — ATORVASTATIN CALCIUM 40 MG: 40 TABLET, FILM COATED ORAL at 20:05

## 2024-05-03 RX ADMIN — CARVEDILOL 25 MG: 25 TABLET, FILM COATED ORAL at 09:38

## 2024-05-03 RX ADMIN — INSULIN GLARGINE 6 UNITS: 100 INJECTION, SOLUTION SUBCUTANEOUS at 20:05

## 2024-05-03 RX ADMIN — Medication 500 MG: at 09:38

## 2024-05-03 ASSESSMENT — PAIN SCALES - WONG BAKER: WONGBAKER_NUMERICALRESPONSE: NO HURT

## 2024-05-03 NOTE — CARE COORDINATION
CM update note.  Pt was a transfer from .  Admitted with parainfluenza.  In isolation.  ID on consult.  Pt remains on IV rocephin q 24.  Vancomycin stopped.  Urine an blood cultures in process.  Pt is from [facility].  Spoke with [facility], pt is long term care bed hold and can return when medically ready.  No precert or pasrr required.  Patricia/destination are completed.  Ambulance form with envelope is on the soft chart.  CM/SW to follow.  Melo Williamson RN -[phone].

## 2024-05-03 NOTE — DISCHARGE INSTR - COC
adults) 05/07/24 05/10/24      Resolved    COVID-19 22 Respiratory Panel, Molecular, with COVID-19 (Restricted: peds pts or suitable admitted adults)   22 Infection                        Nurse Assessment:  Last Vital Signs: BP (!) 142/90   Pulse 75   Temp 98.3 °F (36.8 °C) (Temporal)   Resp 20   Ht 1.778 m (5' 10\")   Wt 83.5 kg (184 lb)   SpO2 95%   BMI 26.40 kg/m²     Last documented pain score (0-10 scale):    Last Weight:   Wt Readings from Last 1 Encounters:   24 83.5 kg (184 lb)     Mental Status:  alert    IV Access:  - None    Nursing Mobility/ADLs:  Walking   Dependent  Transfer  Dependent  Bathing  Dependent  Dressing  Dependent  Toileting  Assisted  Feeding  Assisted  Med Admin  Assisted  Med Delivery   whole    Wound Care Documentation and Therapy:  Wound 22 Femoral Anterior;Right Surgical wound (Active)   Number of days: 713       Incision 22 Anterior;Proximal;Right (Active)   Number of days: 713        Elimination:  Continence:   Bowel: No  Bladder: No  Urinary Catheter: None   Colostomy/Ileostomy/Ileal Conduit: No       Date of Last BM: 2024    Intake/Output Summary (Last 24 hours) at 5/3/2024 1048  Last data filed at 5/3/2024 0456  Gross per 24 hour   Intake 4418.31 ml   Output 2100 ml   Net 2318.31 ml     I/O last 3 completed shifts:  In: 4418.3 [P.O.:240; I.V.:3930.9; IV Piggyback:247.5]  Out: 4100 [Urine:4100]    Safety Concerns:     At Risk for Falls    Impairments/Disabilities:      Language Barrier - Nauruan speaking/nonverbal and Paralysis - right side flaccid    Nutrition Therapy:  Current Nutrition Therapy:   - Oral Diet:  General    Routes of Feeding: Oral  Liquids: No Restrictions  Daily Fluid Restriction: no  Last Modified Barium Swallow with Video (Video Swallowing Test): not done    Treatments at the Time of Hospital Discharge:   Respiratory Treatments: ***  Oxygen Therapy:  is not on home oxygen therapy.  Ventilator:

## 2024-05-03 NOTE — PLAN OF CARE
Problem: Discharge Planning  Goal: Discharge to home or other facility with appropriate resources  5/2/2024 2259 by Randell Florian RN  Outcome: Progressing  5/2/2024 0859 by Nannette Hopkins RN  Outcome: Progressing     Problem: Skin/Tissue Integrity  Goal: Absence of new skin breakdown  Description: 1.  Monitor for areas of redness and/or skin breakdown  2.  Assess vascular access sites hourly  3.  Every 4-6 hours minimum:  Change oxygen saturation probe site  4.  Every 4-6 hours:  If on nasal continuous positive airway pressure, respiratory therapy assess nares and determine need for appliance change or resting period.  5/2/2024 2259 by Randell Florian RN  Outcome: Progressing  5/2/2024 0859 by Nannette Hopkins RN  Outcome: Progressing     Problem: Safety - Adult  Goal: Free from fall injury  5/2/2024 2259 by Randell Florian RN  Outcome: Progressing  5/2/2024 0859 by Nannette Hopkins RN  Outcome: Progressing     Problem: Pain  Goal: Verbalizes/displays adequate comfort level or baseline comfort level  5/2/2024 2259 by Randell Florian RN  Outcome: Progressing  5/2/2024 0859 by Nannette Hopkins RN  Outcome: Progressing     Problem: Chronic Conditions and Co-morbidities  Goal: Patient's chronic conditions and co-morbidity symptoms are monitored and maintained or improved  Outcome: Progressing

## 2024-05-04 LAB
GLUCOSE BLD-MCNC: 191 MG/DL (ref 74–99)
GLUCOSE BLD-MCNC: 259 MG/DL (ref 74–99)
GLUCOSE BLD-MCNC: 297 MG/DL (ref 74–99)
GLUCOSE BLD-MCNC: 303 MG/DL (ref 74–99)
GLUCOSE BLD-MCNC: 319 MG/DL (ref 74–99)
GLUCOSE BLD-MCNC: 367 MG/DL (ref 74–99)

## 2024-05-04 PROCEDURE — 6370000000 HC RX 637 (ALT 250 FOR IP): Performed by: FAMILY MEDICINE

## 2024-05-04 PROCEDURE — 99232 SBSQ HOSP IP/OBS MODERATE 35: CPT | Performed by: INTERNAL MEDICINE

## 2024-05-04 PROCEDURE — 6370000000 HC RX 637 (ALT 250 FOR IP): Performed by: INTERNAL MEDICINE

## 2024-05-04 PROCEDURE — 6360000002 HC RX W HCPCS: Performed by: INTERNAL MEDICINE

## 2024-05-04 PROCEDURE — 2580000003 HC RX 258: Performed by: INTERNAL MEDICINE

## 2024-05-04 PROCEDURE — 2580000003 HC RX 258

## 2024-05-04 PROCEDURE — 1200000000 HC SEMI PRIVATE

## 2024-05-04 PROCEDURE — 82962 GLUCOSE BLOOD TEST: CPT

## 2024-05-04 PROCEDURE — 2700000000 HC OXYGEN THERAPY PER DAY

## 2024-05-04 RX ORDER — INSULIN LISPRO 100 [IU]/ML
0-8 INJECTION, SOLUTION INTRAVENOUS; SUBCUTANEOUS
Status: DISCONTINUED | OUTPATIENT
Start: 2024-05-04 | End: 2024-05-06 | Stop reason: HOSPADM

## 2024-05-04 RX ORDER — INSULIN LISPRO 100 [IU]/ML
0-4 INJECTION, SOLUTION INTRAVENOUS; SUBCUTANEOUS NIGHTLY
Status: DISCONTINUED | OUTPATIENT
Start: 2024-05-04 | End: 2024-05-06 | Stop reason: HOSPADM

## 2024-05-04 RX ORDER — INSULIN LISPRO 100 [IU]/ML
8 INJECTION, SOLUTION INTRAVENOUS; SUBCUTANEOUS ONCE
Status: COMPLETED | OUTPATIENT
Start: 2024-05-04 | End: 2024-05-04

## 2024-05-04 RX ADMIN — SODIUM CHLORIDE, PRESERVATIVE FREE 10 ML: 5 INJECTION INTRAVENOUS at 09:02

## 2024-05-04 RX ADMIN — INSULIN LISPRO 4 UNITS: 100 INJECTION, SOLUTION INTRAVENOUS; SUBCUTANEOUS at 02:01

## 2024-05-04 RX ADMIN — ESCITALOPRAM OXALATE 20 MG: 10 TABLET ORAL at 09:02

## 2024-05-04 RX ADMIN — CARVEDILOL 25 MG: 25 TABLET, FILM COATED ORAL at 16:53

## 2024-05-04 RX ADMIN — AMLODIPINE BESYLATE 10 MG: 10 TABLET ORAL at 09:02

## 2024-05-04 RX ADMIN — ATORVASTATIN CALCIUM 40 MG: 40 TABLET, FILM COATED ORAL at 23:22

## 2024-05-04 RX ADMIN — CARVEDILOL 25 MG: 25 TABLET, FILM COATED ORAL at 09:02

## 2024-05-04 RX ADMIN — SODIUM CHLORIDE, PRESERVATIVE FREE 10 ML: 5 INJECTION INTRAVENOUS at 23:23

## 2024-05-04 RX ADMIN — Medication 500 MG: at 09:02

## 2024-05-04 RX ADMIN — INSULIN LISPRO 6 UNITS: 100 INJECTION, SOLUTION INTRAVENOUS; SUBCUTANEOUS at 10:35

## 2024-05-04 RX ADMIN — INSULIN LISPRO 8 UNITS: 100 INJECTION, SOLUTION INTRAVENOUS; SUBCUTANEOUS at 14:34

## 2024-05-04 RX ADMIN — ENOXAPARIN SODIUM 40 MG: 100 INJECTION SUBCUTANEOUS at 09:02

## 2024-05-04 RX ADMIN — POLYETHYLENE GLYCOL 3350 17 G: 17 POWDER, FOR SOLUTION ORAL at 09:02

## 2024-05-04 RX ADMIN — SODIUM CHLORIDE, PRESERVATIVE FREE 10 ML: 5 INJECTION INTRAVENOUS at 23:22

## 2024-05-04 RX ADMIN — INSULIN GLARGINE 6 UNITS: 100 INJECTION, SOLUTION SUBCUTANEOUS at 09:02

## 2024-05-04 RX ADMIN — INSULIN GLARGINE 6 UNITS: 100 INJECTION, SOLUTION SUBCUTANEOUS at 21:22

## 2024-05-04 RX ADMIN — INSULIN LISPRO 8 UNITS: 100 INJECTION, SOLUTION INTRAVENOUS; SUBCUTANEOUS at 14:37

## 2024-05-04 ASSESSMENT — PAIN SCALES - WONG BAKER: WONGBAKER_NUMERICALRESPONSE: NO HURT

## 2024-05-04 NOTE — PLAN OF CARE
Problem: Discharge Planning  Goal: Discharge to home or other facility with appropriate resources  5/4/2024 0914 by Veda Varghese RN  Outcome: Progressing  5/3/2024 2107 by Randell Florian RN  Outcome: Progressing     Problem: Skin/Tissue Integrity  Goal: Absence of new skin breakdown  Description: 1.  Monitor for areas of redness and/or skin breakdown  2.  Assess vascular access sites hourly  3.  Every 4-6 hours minimum:  Change oxygen saturation probe site  4.  Every 4-6 hours:  If on nasal continuous positive airway pressure, respiratory therapy assess nares and determine need for appliance change or resting period.  5/4/2024 0914 by Veda Varghese RN  Outcome: Progressing  5/3/2024 2107 by Randell Florian RN  Outcome: Progressing     Problem: Safety - Adult  Goal: Free from fall injury  5/4/2024 0914 by Veda Varghese RN  Outcome: Progressing  5/3/2024 2107 by Randell Florian RN  Outcome: Progressing     Problem: Pain  Goal: Verbalizes/displays adequate comfort level or baseline comfort level  5/4/2024 0914 by Veda Varghese RN  Outcome: Progressing  5/3/2024 2107 by Randell Florian RN  Outcome: Progressing     Problem: Chronic Conditions and Co-morbidities  Goal: Patient's chronic conditions and co-morbidity symptoms are monitored and maintained or improved  5/4/2024 0914 by Veda Varghese RN  Outcome: Progressing  5/3/2024 2107 by Randell Florian RN  Outcome: Progressing

## 2024-05-04 NOTE — PLAN OF CARE
Problem: Discharge Planning  Goal: Discharge to home or other facility with appropriate resources  5/3/2024 2107 by Randell Florian RN  Outcome: Progressing  5/3/2024 1658 by Suzanne Gamble RN  Outcome: Progressing     Problem: Skin/Tissue Integrity  Goal: Absence of new skin breakdown  Description: 1.  Monitor for areas of redness and/or skin breakdown  2.  Assess vascular access sites hourly  3.  Every 4-6 hours minimum:  Change oxygen saturation probe site  4.  Every 4-6 hours:  If on nasal continuous positive airway pressure, respiratory therapy assess nares and determine need for appliance change or resting period.  5/3/2024 2107 by Randell Florian RN  Outcome: Progressing  5/3/2024 1658 by Suzanne Gamble RN  Outcome: Progressing     Problem: Safety - Adult  Goal: Free from fall injury  5/3/2024 2107 by Randell Florian RN  Outcome: Progressing  5/3/2024 1658 by Suzanne Gamble RN  Outcome: Progressing     Problem: Pain  Goal: Verbalizes/displays adequate comfort level or baseline comfort level  Outcome: Progressing     Problem: Chronic Conditions and Co-morbidities  Goal: Patient's chronic conditions and co-morbidity symptoms are monitored and maintained or improved  Outcome: Progressing

## 2024-05-05 VITALS
DIASTOLIC BLOOD PRESSURE: 80 MMHG | RESPIRATION RATE: 18 BRPM | WEIGHT: 171.13 LBS | TEMPERATURE: 97.8 F | OXYGEN SATURATION: 92 % | BODY MASS INDEX: 24.5 KG/M2 | HEART RATE: 89 BPM | HEIGHT: 70 IN | SYSTOLIC BLOOD PRESSURE: 131 MMHG

## 2024-05-05 LAB
ANION GAP SERPL CALCULATED.3IONS-SCNC: 14 MMOL/L (ref 7–16)
BUN SERPL-MCNC: 22 MG/DL (ref 6–20)
CALCIUM SERPL-MCNC: 9.1 MG/DL (ref 8.6–10.2)
CHLORIDE SERPL-SCNC: 103 MMOL/L (ref 98–107)
CO2 SERPL-SCNC: 21 MMOL/L (ref 22–29)
CREAT SERPL-MCNC: 1 MG/DL (ref 0.7–1.2)
ERYTHROCYTE [DISTWIDTH] IN BLOOD BY AUTOMATED COUNT: 12.2 % (ref 11.5–15)
GFR, ESTIMATED: >90 ML/MIN/1.73M2
GLUCOSE BLD-MCNC: 257 MG/DL (ref 74–99)
GLUCOSE BLD-MCNC: 295 MG/DL (ref 74–99)
GLUCOSE BLD-MCNC: 296 MG/DL (ref 74–99)
GLUCOSE BLD-MCNC: 338 MG/DL (ref 74–99)
GLUCOSE SERPL-MCNC: 360 MG/DL (ref 74–99)
HCT VFR BLD AUTO: 39.9 % (ref 37–54)
HGB BLD-MCNC: 13.6 G/DL (ref 12.5–16.5)
MCH RBC QN AUTO: 27.9 PG (ref 26–35)
MCHC RBC AUTO-ENTMCNC: 34.1 G/DL (ref 32–34.5)
MCV RBC AUTO: 81.9 FL (ref 80–99.9)
MICROORGANISM SPEC CULT: NORMAL
MICROORGANISM/AGENT SPEC: NORMAL
PLATELET # BLD AUTO: 185 K/UL (ref 130–450)
PMV BLD AUTO: 9.8 FL (ref 7–12)
POTASSIUM SERPL-SCNC: 4 MMOL/L (ref 3.5–5)
RBC # BLD AUTO: 4.87 M/UL (ref 3.8–5.8)
SERVICE CMNT-IMP: NORMAL
SERVICE CMNT-IMP: NORMAL
SODIUM SERPL-SCNC: 138 MMOL/L (ref 132–146)
SPECIMEN DESCRIPTION: NORMAL
WBC OTHER # BLD: 9 K/UL (ref 4.5–11.5)

## 2024-05-05 PROCEDURE — 82962 GLUCOSE BLOOD TEST: CPT

## 2024-05-05 PROCEDURE — 85027 COMPLETE CBC AUTOMATED: CPT

## 2024-05-05 PROCEDURE — 2700000000 HC OXYGEN THERAPY PER DAY

## 2024-05-05 PROCEDURE — 6370000000 HC RX 637 (ALT 250 FOR IP): Performed by: INTERNAL MEDICINE

## 2024-05-05 PROCEDURE — 80048 BASIC METABOLIC PNL TOTAL CA: CPT

## 2024-05-05 PROCEDURE — 36415 COLL VENOUS BLD VENIPUNCTURE: CPT

## 2024-05-05 PROCEDURE — 6370000000 HC RX 637 (ALT 250 FOR IP): Performed by: FAMILY MEDICINE

## 2024-05-05 PROCEDURE — 99239 HOSP IP/OBS DSCHRG MGMT >30: CPT | Performed by: INTERNAL MEDICINE

## 2024-05-05 PROCEDURE — 6360000002 HC RX W HCPCS: Performed by: INTERNAL MEDICINE

## 2024-05-05 PROCEDURE — 2580000003 HC RX 258: Performed by: INTERNAL MEDICINE

## 2024-05-05 RX ORDER — INSULIN GLARGINE 100 [IU]/ML
6 INJECTION, SOLUTION SUBCUTANEOUS ONCE
Status: COMPLETED | OUTPATIENT
Start: 2024-05-05 | End: 2024-05-05

## 2024-05-05 RX ORDER — INSULIN GLARGINE 100 [IU]/ML
12 INJECTION, SOLUTION SUBCUTANEOUS 2 TIMES DAILY
Status: DISCONTINUED | OUTPATIENT
Start: 2024-05-05 | End: 2024-05-06 | Stop reason: HOSPADM

## 2024-05-05 RX ORDER — ESCITALOPRAM OXALATE 20 MG/1
20 TABLET ORAL DAILY
Qty: 30 TABLET | Refills: 3 | Status: SHIPPED | OUTPATIENT
Start: 2024-05-05

## 2024-05-05 RX ADMIN — INSULIN GLARGINE 12 UNITS: 100 INJECTION, SOLUTION SUBCUTANEOUS at 20:18

## 2024-05-05 RX ADMIN — ENOXAPARIN SODIUM 40 MG: 100 INJECTION SUBCUTANEOUS at 09:39

## 2024-05-05 RX ADMIN — INSULIN GLARGINE 6 UNITS: 100 INJECTION, SOLUTION SUBCUTANEOUS at 12:19

## 2024-05-05 RX ADMIN — CARVEDILOL 25 MG: 25 TABLET, FILM COATED ORAL at 09:39

## 2024-05-05 RX ADMIN — SODIUM CHLORIDE, PRESERVATIVE FREE 10 ML: 5 INJECTION INTRAVENOUS at 09:41

## 2024-05-05 RX ADMIN — POLYETHYLENE GLYCOL 3350 17 G: 17 POWDER, FOR SOLUTION ORAL at 09:39

## 2024-05-05 RX ADMIN — INSULIN LISPRO 4 UNITS: 100 INJECTION, SOLUTION INTRAVENOUS; SUBCUTANEOUS at 17:20

## 2024-05-05 RX ADMIN — BISACODYL 10 MG: 10 SUPPOSITORY RECTAL at 09:41

## 2024-05-05 RX ADMIN — INSULIN GLARGINE 6 UNITS: 100 INJECTION, SOLUTION SUBCUTANEOUS at 09:39

## 2024-05-05 RX ADMIN — ESCITALOPRAM OXALATE 20 MG: 10 TABLET ORAL at 09:39

## 2024-05-05 RX ADMIN — INSULIN LISPRO 6 UNITS: 100 INJECTION, SOLUTION INTRAVENOUS; SUBCUTANEOUS at 09:48

## 2024-05-05 RX ADMIN — ATORVASTATIN CALCIUM 40 MG: 40 TABLET, FILM COATED ORAL at 20:18

## 2024-05-05 RX ADMIN — INSULIN LISPRO 4 UNITS: 100 INJECTION, SOLUTION INTRAVENOUS; SUBCUTANEOUS at 12:19

## 2024-05-05 RX ADMIN — AMLODIPINE BESYLATE 10 MG: 10 TABLET ORAL at 09:39

## 2024-05-05 RX ADMIN — CARVEDILOL 25 MG: 25 TABLET, FILM COATED ORAL at 17:20

## 2024-05-05 RX ADMIN — Medication 500 MG: at 09:39

## 2024-05-05 ASSESSMENT — PAIN SCALES - WONG BAKER: WONGBAKER_NUMERICALRESPONSE: NO HURT

## 2024-05-05 NOTE — PROGRESS NOTES
Hospitalist Progress Note      SYNOPSIS: Patient admitted on 2024 for Parainfluenza      SUBJECTIVE:    Patient seen and examined. He is nonverbal and nods to questions.  Records reviewed.     44-year-old male past medical history of CVA seizure hypertension diabetes hyperlipidemia presented due to fever of 103 also hypoxic to 88% on room air.  Placed on 4 L oxygen CT of the chest showed bronchopneumonia positive for parainfluenza.  LP was performed in the ER due to concern for altered mental status which was unrevealing.  Patient is nonverbal at baseline.    Stable overnight. No other overnight issues reported.   Temp (24hrs), Av.2 °F (39 °C), Min:99.9 °F (37.7 °C), Max:103.4 °F (39.7 °C)    DIET: Diet NPO Exceptions are: Sips of Water with Meds  CODE: Full Code    Intake/Output Summary (Last 24 hours) at 2024 0926  Last data filed at 2024 0522  Gross per 24 hour   Intake 2930.81 ml   Output --   Net 2930.81 ml       OBJECTIVE:    BP (!) 141/94   Pulse 97   Temp 99.9 °F (37.7 °C) (Rectal)   Resp 16   Wt 78 kg (171 lb 15.3 oz)   SpO2 95%   BMI 24.67 kg/m²     General appearance: No apparent distress, appears stated age and cooperative.  HEENT:  Conjunctivae/corneas clear.   Neck: Supple. No jugular venous distention.   Respiratory: Clear to auscultation bilaterally, normal respiratory effort  Cardiovascular: Regular rate rhythm, normal S1-S2  Abdomen: Soft, nontender, nondistended  Musculoskeletal: No clubbing, cyanosis, no bilateral lower extremity edema. Brisk capillary refill.   Skin:  No rashes  on visible skin  Neurologic: awake, alert and following commands     ASSESSMENT:  Metabolic encephalopathy  Acute hypoxic respiratory failure  Parainfluenza pneumonia  Insulin-dependent diabetes  History of CVA  History of seizure       PLAN:  Continue IV fluid hydration for now.  IV antibiotics for now.  ID has been consulted on admission.  Appreciate input.  Continue blood sugar control.  
    Hospitalist Progress Note      SYNOPSIS: Patient admitted on 2024 for Parainfluenza    44-year-old male past medical history of CVA seizure hypertension diabetes hyperlipidemia presented due to fever of 103 also hypoxic to 88% on room air.  Placed on 4 L oxygen CT of the chest showed bronchopneumonia positive for parainfluenza.  LP was performed in the ER due to concern for altered mental status which was unrevealing.  Patient is nonverbal at baseline.  Patient remains on Rocephin at this time for sepsis suspected due to viral pneumonia with superimposed bacterial pneumonia.     SUBJECTIVE:    Patient seen and examined.   Records reviewed.   Stable overnight. No other overnight issues reported.     Temp (24hrs), Av °F (36.7 °C), Min:97.5 °F (36.4 °C), Max:98.3 °F (36.8 °C)    DIET: ADULT DIET; Regular  CODE: Full Code    Intake/Output Summary (Last 24 hours) at 5/3/2024 1004  Last data filed at 5/3/2024 0456  Gross per 24 hour   Intake 4418.31 ml   Output 2100 ml   Net 2318.31 ml         OBJECTIVE:    BP (!) 142/90   Pulse 75   Temp 98.3 °F (36.8 °C) (Temporal)   Resp 20   Ht 1.778 m (5' 10\")   Wt 83.5 kg (184 lb)   SpO2 95%   BMI 26.40 kg/m²     General appearance: No apparent distress, appears stated age and cooperative.  HEENT:  Conjunctivae/corneas clear.   Neck: Supple. No jugular venous distention.   Respiratory: Clear to auscultation bilaterally, normal respiratory effort  Cardiovascular: Regular rate rhythm, normal S1-S2  Abdomen: Soft, nontender, nondistended  Musculoskeletal: No clubbing, cyanosis, no bilateral lower extremity edema. Brisk capillary refill.   Skin:  No rashes  on visible skin  Neurologic: awake, alert and following commands     ASSESSMENT:  Metabolic encephalopathy due to below  Acute hypoxic respiratory failure  Sepsis due to Parainfluenza pneumonia with suspected superimposed bacterial pneumonia   Insulin-dependent diabetes  History of CVA  History of seizure     
 [facility] PROGRESS NOTE      Chief complaint: Follow-up of fever    The patient is a 54 y.o. male with history of DM, hypertension, seizure disorder, stroke with residual right-sided hemiparesis and aphasia, presented on 04/30 with fever accompanied by hypoxemia and lethargy.  On admission, he was febrile up to 103.3 °F with no leukocytosis.  Procalcitonin level was elevated at 5 ng/mL. CT chest, abdomen and pelvis showed no acute pulmonary embolism, multifocal tree-in-bud opacification suggestive of bronchiolitis or bronchopneumonia, no acute intra-abdominal pelvic process.  Urinalysis showed insignificant pyuria of 0-5 WBC with urine culture growning mixed doreen. Respiratory pathogen PCR panel was positive for parainfluenza 3.  Blood cultures showed no growth to date.  He underwent lumbar puncture yielding 4 mL of clear CSF with elevated glucose of 152 mg/dL, elevated protein of 90.4 mg/dL, elevated WBCs of 24 predominantly neutrophilic at 67%. CSF meningitis/encephalitis PCR panel was negative for HSV, VZV, CMV, Enterovirus, HHV-6, Streptococcus pneumoniae and agalactiae, E coli, Haemophilus, Neisseria meninigitidis, Listeria monocytogenes, Cryptococcus.  CSF Gram stain and culture showed no polymorphonuclear leukocytes, no organisms, no growth to date. MRSA nares culture, urine Streptococcus pneumoniae and Legionella antigens were negative. HIV screen was nonreactive. He he received a dose of piperacillin-tazobactam on admission.  Vancomycin was started on admission.  Acyclovir, ceftriaxone, ampicillin, dexamethasone were started on 05/01.     Subjective: Patient was seen and examined. No chills, has some abdominal pain, no diarrhea, no rash, no itching. Afebrile.      Objective:    Vitals:    05/03/24 0800   BP: (!) 142/90   Pulse: 75   Resp: 20   Temp: 98.3 °F (36.8 °C)   SpO2: 95%     Constitutional: Alert, not in distress  Respiratory: Clear breath sounds, no crackles, no wheezes  Cardiovascular: Regular rate 
 [facility] PROGRESS NOTE      Chief complaint: Follow-up of fever    The patient is a 54 y.o. male with history of DM, hypertension, seizure disorder, stroke with residual right-sided hemiparesis and aphasia, presented on 04/30 with fever accompanied by hypoxemia and lethargy.  On admission, he was febrile up to 103.3 °F with no leukocytosis.  Procalcitonin level was elevated at 5 ng/mL. CT chest, abdomen and pelvis showed no acute pulmonary embolism, multifocal tree-in-bud opacification suggestive of bronchiolitis or bronchopneumonia, no acute intra-abdominal pelvic process.  Urinalysis showed insignificant pyuria of 0-5 WBC.  Respiratory pathogen PCR panel was positive for parainfluenza 3.  Blood cultures showed no growth to date.  He underwent lumbar puncture yielding 4 mL of clear CSF with elevated glucose of 152 mg/dL, elevated protein of 90.4 mg/dL, elevated WBCs of 24 predominantly neutrophilic at 67%. CSF meningitis/encephalitis PCR panel was negative for HSV, VZV, CMV, Enterovirus, HHV-6, Streptococcus pneumoniae and agalactiae, E coli, Haemophilus, Neisseria meninigitidis, Listeria monocytogenes, Cryptococcus.  CSF Gram stain and culture showed no polymorphonuclear leukocytes, no organisms, no growth to date. HIV screen was nonreactive. He he received a dose of piperacillin-tazobactam on admission.  Vancomycin was started on admission.  Acyclovir, ceftriaxone, ampicillin, dexamethasone were started on 05/01.     Subjective: Patient was seen and examined. No chills, has abdominal pain, has diarrhea, no rash, no itching. Afebrile.      Objective:    Vitals:    05/02/24 0804   BP: (!) 140/86   Pulse: 84   Resp: 16   Temp: 98.7 °F (37.1 °C)   SpO2: 95%     Constitutional: Alert, not in distress  Respiratory: Clear breath sounds, no crackles, no wheezes  Cardiovascular: Regular rate and rhythm, no murmurs  Gastrointestinal: Bowel sounds present, soft, nontender  Skin: Warm and dry, no active 
4 Eyes Skin Assessment     NAME:  Isra Schwartz  YOB: 1970  MEDICAL RECORD NUMBER:  [id number]    The patient is being assessed for  Admission    I agree that at least one RN has performed a thorough Head to Toe Skin Assessment on the patient. ALL assessment sites listed below have been assessed.      Areas assessed by both nurses:    Head, Face, Ears, Shoulders, Back, Chest, Arms, Elbows, Hands, Sacrum. Buttock, Coccyx, Ischium, and Legs. Feet and Heels        Does the Patient have a Wound? No noted wound(s)       Efe Prevention initiated by RN: Yes  Wound Care Orders initiated by RN: No    Pressure Injury (Stage 3,4, Unstageable, DTI, NWPT, and Complex wounds) if present, place Wound referral order by RN under : No    New Ostomies, if present place, Ostomy referral order under : No     Nurse 1 eSignature: Electronically signed by Delma Gay RN on 5/1/24 at 10:15 PM EDT    **SHARE this note so that the co-signing nurse can place an eSignature**    Nurse 2 eSignature: Electronically signed by Erna Peters RN on 5/2/24 at 12:09 AM EDT   
4 Eyes Skin Assessment     NAME:  Isra Schwartz  YOB: 1970  MEDICAL RECORD NUMBER:  [id number]    The patient is being assessed for  Transfer to New Unit    I agree that at least one RN has performed a thorough Head to Toe Skin Assessment on the patient. ALL assessment sites listed below have been assessed.      Areas assessed by both nurses:    Head, Face, Ears, Shoulders, Back, Chest, Arms, Elbows, Hands, Sacrum. Buttock, Coccyx, Ischium, Legs. Feet and Heels, and Under Medical Devices         Does the Patient have a Wound? No noted wound(s)  Right great toe red spot  Bruising to abdomen  Old scab 3x3x0.1 left knee  Blanchable redness to buttocks       Efe Prevention initiated by RN: Yes  Wound Care Orders initiated by RN: No    Pressure Injury (Stage 3,4, Unstageable, DTI, NWPT, and Complex wounds) if present, place Wound referral order by RN under : No    New Ostomies, if present place, Ostomy referral order under : No     Nurse 1 eSignature: Electronically signed by Suzanne Gamble RN on 5/2/24 at 6:41 PM EDT    **SHARE this note so that the co-signing nurse can place an eSignature**    Nurse 2 eSignature: Electronically signed by Erna Ge RN on 5/2/24 at 7:15 PM EDT    
Called Donna Mora to give nurse to nurse report, phone number given and facility said the nurse will call back when available.  
Dr Parkinson messaged via [employer] to check if patient can be downgraded to a general floor.  
Infectious disease consult called to  per id line(yazmin) patient added to census.   
Nurse to nurse called to 5W[facility].   
Nurse to nurse called to 8WX.   
Nurse to nurse report called to Donna Mora.  
[facility], spoke with patient's sister to give an update  
Pharmacy Consultation Note  (Antibiotic Dosing and Monitoring)    Initial consult date: 5-1-2024  Consulting physician/provider: Dr. Berg  Drug: Vancomycin  Indication: CNS infection/meningitis    Age/  Gender Height Weight IBW  Allergy Information   54 y.o./male 177.8 cm 78 kg (171 lb 15.3 oz)     Ideal body weight: 73 kg (160 lb 15 oz)  Adjusted ideal body weight: 75 kg (165 lb 5.5 oz)   Patient has no known allergies.      Renal Function:  Recent Labs     04/30/24  1707 05/01/24  0813   BUN 20 19   CREATININE 1.3* 1.1       Intake/Output Summary (Last 24 hours) at 5/1/2024 1105  Last data filed at 5/1/2024 0522  Gross per 24 hour   Intake 2930.81 ml   Output --   Net 2930.81 ml       Vancomycin Monitoring:  Trough:  No results for input(s): \"VANCOTROUGH\" in the last 72 hours.  Random:  No results for input(s): \"VANCORANDOM\" in the last 72 hours.    Vancomycin Administration Times:  Recent vancomycin administrations                     vancomycin (VANCOCIN) 1500 mg in sodium chloride 0.9 % 250 mL IVPB (mg) 1,500 mg New Bag 04/30/24 1946                    Assessment:  53 yo/M, presented from NH for temp (Tmax 103.4F).  Tachycardic, hypertensive, and hypoxemic in ED.    +Parainfluenza-3 by Viral PCR.    Empiric ATBs including vancomycin started for suspected CNS infection/meningitis.  Had LP; Cx's pending; Meningococcal PCR panel negative.  Received vancomycin 1500 mg x1 on 4/30 @ 1946.   ID has been consulted.  Estimated Creatinine Clearance: 77 mL/min (based on SCr of 1.1 mg/dL).  To dose vancomycin, pharmacy will be utilizing [employer] calculation software for goal AUC/YUDY 400-600 mg/L-hr (predicted AUC/YUDY = 422, Tr = 9.1 mcg/mL)    Plan:  Change to vancomycin 1500 mg q24h starting 5/2.  Check vancomycin level on 5/2 AM.   Will continue to monitor renal function.   Pharmacy to follow.    Nino Choudhary, PharmD  5/1/2024  11:12 AM  [phone]       
Pharmacy Consultation Note  (Antibiotic Dosing and Monitoring)    Initial consult date: 5-1-2024  Consulting physician/provider: Dr. Berg  Drug: Vancomycin  Indication: CNS infection/meningitis    Age/  Gender Height Weight IBW  Allergy Information   54 y.o./male 177.8 cm 78 kg (171 lb 15.3 oz)     Ideal body weight: 73 kg (160 lb 15 oz)  Adjusted ideal body weight: 77.5 kg (170 lb 14.4 oz)   Patient has no known allergies.      Renal Function:  Recent Labs     04/30/24  1707 05/01/24  0813 05/02/24  0630   BUN 20 19 18   CREATININE 1.3* 1.1 0.9         Intake/Output Summary (Last 24 hours) at 5/2/2024 0931  Last data filed at 5/2/2024 0544  Gross per 24 hour   Intake --   Output 1800 ml   Net -1800 ml         Vancomycin Monitoring:  Trough:  No results for input(s): \"VANCOTROUGH\" in the last 72 hours.  Random:    Recent Labs     05/02/24  0630   VANCORANDOM 7.3       Vancomycin Administration Times:  Recent vancomycin administrations                     vancomycin (VANCOCIN) 1500 mg in sodium chloride 0.9 % 250 mL IVPB (mg) 1,500 mg New Bag 04/30/24 1946                    Assessment:  53 yo/M, presented from NH for temp (Tmax 103.4F).  Tachycardic, hypertensive, and hypoxemic in ED.    +Parainfluenza-3 by Viral PCR.    Empiric ATBs including vancomycin started for suspected CNS infection/meningitis.  Had LP; Cx's pending; Meningococcal PCR panel negative.  Received vancomycin 1500 mg x1 on 4/30 @ 1946.   ID has been consulted.  Estimated Creatinine Clearance: 95 mL/min (based on SCr of 0.9 mg/dL).  To dose vancomycin, pharmacy will be utilizing [employer] calculation software for goal AUC/YUDY 400-600 mg/L-hr (predicted AUC/YUDY = 443, Tr =11.9 mcg/mL)    Plan:  Change to vancomycin 1000 mg Q12H starting 5/2.  Will continue to monitor renal function.   Pharmacy to follow.    Ruben De Paz PharmD, BCPS 5/2/2024 9:32 AM  [phone]       
Pharmacy Consultation Note  (Antibiotic Dosing and Monitoring)    Initial consult date: 5/1/2024  Consulting physician/provider: Dr. Berg  Drug: Vancomycin  Indication: CNS empiric    Age/  Gender Height Weight IBW  Allergy Information   54 y.o./male 177.8 cm  5'10\" 78 kg (171 lb 15.3 oz)     Ideal body weight: 73 kg (160 lb 15 oz)  Adjusted ideal body weight: 75 kg (165 lb 5.5 oz)   Patient has no known allergies.      Renal Function:  Recent Labs     04/30/24  1707   BUN 20   CREATININE 1.3*       Intake/Output Summary (Last 24 hours) at 5/1/2024 0158  Last data filed at 4/30/2024 2234  Gross per 24 hour   Intake 2350 ml   Output --   Net 2350 ml       Vancomycin Monitoring:  Trough:  No results for input(s): \"VANCOTROUGH\" in the last 72 hours.  Random:  No results for input(s): \"VANCORANDOM\" in the last 72 hours.    No results for input(s): \"BLOODCULT2\" in the last 72 hours.     Historical Cultures:  No results found for: \"ORG\"  No results for input(s): \"BC\" in the last 72 hours.    Vancomycin Administration Times:  Recent vancomycin administrations                     vancomycin (VANCOCIN) 1500 mg in sodium chloride 0.9 % 250 mL IVPB (mg) 1,500 mg New Bag 04/30/24 1946                    Assessment:  Patient is a 54 y.o. male who has been initiated on vancomycin for CNS empiric  Estimated Creatinine Clearance: 66 mL/min (A) (based on SCr of 1.3 mg/dL (H)).  To dose vancomycin, pharmacy will be utilizing [employer] calculation software for goal AUC/YUDY 400-600 mg/L-hr    Plan:  Vancomycin 1750 mg q12h (Predicted AUC/YUDY = 547, Tr = 12.7)  Will check vancomycin levels when appropriate  Will continue to monitor renal function   Pharmacy to follow    Thank you for this consult,    Nilesh Madrigal, [facility] 5/1/2024 1:58 AM    
[facility] with 2 hour transport delay. Pt was set up for 6:30, now pushed back to 8:30 pm.  
  Insulin-dependent diabetes  History of CVA  History of seizure       PLAN:  Continue IV fluid hydration for now.  IV antibiotics for now.  ID has been consulted on admission.  Appreciate input.  Continue blood sugar control.  Appreciate input.  Continue blood sugar control.  Patient was started on IV acyclovir, IV vancomycin, IV ampicillin and Rocephin in the ER also received dexamethasone for meningitis coverage.  ID has not deescalated to Rocephin and Vancomycin pending further CSF analysis and cultures   Patient remains on 4L at this time. Labs reviewed and unremarkable   DISPOSITION:     Medications:  REVIEWED DAILY    Infusion Medications    sodium chloride 100 mL/hr at 05/02/24 0833    dextrose      sodium chloride       Scheduled Medications    cefTRIAXone (ROCEPHIN) IV  2,000 mg IntraVENous Q24H    amLODIPine  10 mg Oral Daily    ascorbic acid  500 mg Oral Daily    atorvastatin  40 mg Oral Nightly    carvedilol  25 mg Oral BID WC    escitalopram  20 mg Oral Daily    bisacodyl  10 mg Rectal Daily    polyethylene glycol  17 g Oral Daily    insulin glargine  6 Units SubCUTAneous BID    insulin lispro  0-8 Units SubCUTAneous Q4H    sodium chloride flush  5-40 mL IntraVENous 2 times per day    sodium chloride flush  5-40 mL IntraVENous 2 times per day    enoxaparin  40 mg SubCUTAneous Daily     PRN Meds: glucose, dextrose bolus **OR** dextrose bolus, glucagon (rDNA), dextrose, sodium chloride flush, benzocaine-menthol, benzonatate, hydrALAZINE, calcium carbonate, melatonin, Polyvinyl Alcohol-Povidone PF, sodium chloride, sodium chloride flush, sodium chloride, potassium chloride **OR** potassium alternative oral replacement **OR** potassium chloride, magnesium sulfate, ondansetron **OR** ondansetron, acetaminophen **OR** acetaminophen    Labs:     Recent Labs     04/30/24  1707 05/01/24  0813 05/02/24  0630   WBC 10.7 10.7 6.7   HGB 15.7 13.8 12.5   HCT 46.1 41.1 37.0    115*  --          Recent Labs     
    Recent Labs     05/02/24  0630   WBC 6.7   HGB 12.5   HCT 37.0         Recent Labs     05/02/24  0630      K 3.7   *   CO2 20*   BUN 18   CREATININE 0.9   CALCIUM 8.5*         No results for input(s): \"PROT\", \"ALKPHOS\", \"ALT\", \"AST\", \"BILITOT\", \"AMYLASE\", \"LIPASE\" in the last 72 hours.    Invalid input(s): \"ALB\"      No results for input(s): \"INR\" in the last 72 hours.    No results for input(s): \"CKTOTAL\", \"TROPONINI\" in the last 72 hours.    Chronic labs:    Lab Results   Component Value Date    CHOL 98 05/01/2024    TRIG 99 05/01/2024    HDL 23 (L) 05/01/2024    TSH 1.69 05/01/2024    INR 1.4 05/19/2022    LABA1C 9.1 (H) 05/01/2024       Radiology: REVIEWED DAILY    +++++++++++++++++++++++++++++++++++++++++++++++++  Emmy Parkinson MD  Hospitalist  [place], [place]  +++++++++++++++++++++++++++++++++++++++++++++++++  NOTE: This report was transcribed using voice recognition software. Every effort was made to ensure accuracy; however, inadvertent computerized transcription errors may be present.

## 2024-05-05 NOTE — PLAN OF CARE
Problem: Discharge Planning  Goal: Discharge to home or other facility with appropriate resources  5/5/2024 1515 by Veda Varghese RN  Outcome: Progressing  5/5/2024 0126 by Karely Neff RN  Outcome: Progressing     Problem: Skin/Tissue Integrity  Goal: Absence of new skin breakdown  Description: 1.  Monitor for areas of redness and/or skin breakdown  2.  Assess vascular access sites hourly  3.  Every 4-6 hours minimum:  Change oxygen saturation probe site  4.  Every 4-6 hours:  If on nasal continuous positive airway pressure, respiratory therapy assess nares and determine need for appliance change or resting period.  5/5/2024 1515 by Veda Varghese RN  Outcome: Progressing  5/5/2024 0126 by Karely Neff RN  Outcome: Progressing     Problem: Safety - Adult  Goal: Free from fall injury  5/5/2024 1515 by Veda Varghese RN  Outcome: Progressing  5/5/2024 0126 by Karely Neff RN  Outcome: Progressing     Problem: Pain  Goal: Verbalizes/displays adequate comfort level or baseline comfort level  5/5/2024 1515 by Veda Varghese RN  Outcome: Progressing  5/5/2024 0126 by Karely Neff RN  Outcome: Progressing     Problem: Chronic Conditions and Co-morbidities  Goal: Patient's chronic conditions and co-morbidity symptoms are monitored and maintained or improved  5/5/2024 1515 by Veda Varghese RN  Outcome: Progressing  5/5/2024 0126 by Karely Neff RN  Outcome: Progressing

## 2024-05-05 NOTE — DISCHARGE SUMMARY
Hospital Medicine Discharge Summary    Patient ID: Isra Schwartz      Patient's PCP: Kaylyn Alex MD    Admit Date: 4/30/2024     Discharge Date:   05/05/24    Admitting Physician: Salma Berg DO     Discharge Physician: Emmy Parkinson MD     Discharge Diagnoses:       Active Hospital Problems    Diagnosis Date Noted    History of CVA (cerebrovascular accident) [Z86.73] 05/19/2022     Priority: Medium    History of seizures [Z87.898] 05/19/2022     Priority: Medium    Primary hypertension [I10] 05/19/2022     Priority: Medium    Sepsis (HCC) [A41.9] 05/19/2022     Priority: Medium    Type 2 diabetes mellitus (HCC) [E11.9] 05/19/2022     Priority: Medium    Parainfluenza [B34.8] 04/30/2024       The patient was seen and examined on day of discharge and this discharge summary is in conjunction with any daily progress note from day of discharge.    Hospital Course:     44-year-old male past medical history of CVA seizure hypertension diabetes hyperlipidemia presented due to fever of 103 also hypoxic to 88% on room air.  Placed on 4 L oxygen CT of the chest showed bronchopneumonia positive for parainfluenza.  LP was performed in the ER due to concern for altered mental status which was unrevealing.  Patient is nonverbal at baseline.  Patient remains on Rocephin at this time for sepsis suspected due to viral pneumonia with superimposed bacterial pneumonia.   All antibiotics stopped on 5/3. Patient did well and was weaned off oxygen. He was [facility] in [facility] condition.     Exam:     BP (!) 140/84 Comment: notify the nurse  Pulse 71   Temp 98.4 °F (36.9 °C) (Temporal)   Resp 17   Ht 1.778 m (5' 10\")   Wt 77.6 kg (171 lb 2 oz)   SpO2 94%   BMI 24.55 kg/m²     General appearance: No apparent distress, appears stated age and cooperative.  HEENT: Pupils equal, round, and reactive to light. Conjunctivae/corneas clear.  Neck: Supple, with full range of motion. No jugular venous
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
## (undated) DEVICE — GOWN,AURORA,BRTHSLV,2XL,18/CS: Brand: MEDLINE

## (undated) DEVICE — REAMER SURG FOR FEM NK COMPLETE OPENING SYS NS

## (undated) DEVICE — INTENDED FOR TISSUE SEPARATION, AND OTHER PROCEDURES THAT REQUIRE A SHARP SURGICAL BLADE TO PUNCTURE OR CUT.: Brand: BARD-PARKER ® STAINLESS STEEL BLADES

## (undated) DEVICE — Z DISCONTINUED PER MEDLINE USE 2741942 DRESSING AQUACEL 6 IN ALG W9XL15CM SIL CVR WTRPRF VIR BACT BARR ANTIMIC

## (undated) DEVICE — COVER,TABLE,60X90,STERILE: Brand: MEDLINE

## (undated) DEVICE — GLOVE ORANGE PI 8   MSG9080

## (undated) DEVICE — DRAPE C ARM W41XL74IN UNIV MOB W RUBBERBAND CLP

## (undated) DEVICE — FRACTURE TABLE: Brand: MEDLINE INDUSTRIES, INC.

## (undated) DEVICE — GLOVE ORTHO 8   MSG9480

## (undated) DEVICE — BIT DRL L413MM DIA4.3MM FOR FEM NK SYSTEN

## (undated) DEVICE — 3M™ COBAN™ NL STERILE NON-LATEX SELF-ADHERENT WRAP, 2084S, 4 IN X 5 YD (10 CM X 4,5 M), 18 ROLLS/CASE: Brand: 3M™ COBAN™

## (undated) DEVICE — GUIDEWIRE ORTH L400MM DIA3.2MM FOR TFN